# Patient Record
Sex: FEMALE | Race: ASIAN | NOT HISPANIC OR LATINO
[De-identification: names, ages, dates, MRNs, and addresses within clinical notes are randomized per-mention and may not be internally consistent; named-entity substitution may affect disease eponyms.]

---

## 2017-08-31 PROBLEM — Z00.00 ENCOUNTER FOR PREVENTIVE HEALTH EXAMINATION: Status: ACTIVE | Noted: 2017-08-31

## 2017-09-01 ENCOUNTER — APPOINTMENT (OUTPATIENT)
Dept: THORACIC SURGERY | Facility: CLINIC | Age: 77
End: 2017-09-01
Payer: MEDICARE

## 2017-09-01 VITALS
TEMPERATURE: 98 F | OXYGEN SATURATION: 95 % | RESPIRATION RATE: 19 BRPM | HEART RATE: 92 BPM | BODY MASS INDEX: 23.19 KG/M2 | HEIGHT: 62 IN | WEIGHT: 126 LBS | DIASTOLIC BLOOD PRESSURE: 69 MMHG | SYSTOLIC BLOOD PRESSURE: 132 MMHG

## 2017-09-01 DIAGNOSIS — I10 ESSENTIAL (PRIMARY) HYPERTENSION: ICD-10-CM

## 2017-09-01 DIAGNOSIS — Z88.9 ALLERGY STATUS TO UNSPECIFIED DRUGS, MEDICAMENTS AND BIOLOGICAL SUBSTANCES: ICD-10-CM

## 2017-09-01 DIAGNOSIS — Z80.0 FAMILY HISTORY OF MALIGNANT NEOPLASM OF DIGESTIVE ORGANS: ICD-10-CM

## 2017-09-01 PROCEDURE — 99204 OFFICE O/P NEW MOD 45 MIN: CPT

## 2017-09-01 RX ORDER — LEVOTHYROXINE SODIUM 137 UG/1
TABLET ORAL
Refills: 0 | Status: ACTIVE | COMMUNITY

## 2017-09-05 ENCOUNTER — OUTPATIENT (OUTPATIENT)
Dept: OUTPATIENT SERVICES | Facility: HOSPITAL | Age: 77
LOS: 1 days | End: 2017-09-05
Payer: MEDICARE

## 2017-09-05 VITALS
WEIGHT: 126.99 LBS | HEART RATE: 96 BPM | TEMPERATURE: 98 F | OXYGEN SATURATION: 96 % | SYSTOLIC BLOOD PRESSURE: 124 MMHG | DIASTOLIC BLOOD PRESSURE: 70 MMHG | RESPIRATION RATE: 14 BRPM | HEIGHT: 62 IN

## 2017-09-05 DIAGNOSIS — E03.9 HYPOTHYROIDISM, UNSPECIFIED: ICD-10-CM

## 2017-09-05 DIAGNOSIS — R91.8 OTHER NONSPECIFIC ABNORMAL FINDING OF LUNG FIELD: ICD-10-CM

## 2017-09-05 DIAGNOSIS — J45.909 UNSPECIFIED ASTHMA, UNCOMPLICATED: ICD-10-CM

## 2017-09-05 DIAGNOSIS — K21.9 GASTRO-ESOPHAGEAL REFLUX DISEASE WITHOUT ESOPHAGITIS: ICD-10-CM

## 2017-09-05 DIAGNOSIS — I10 ESSENTIAL (PRIMARY) HYPERTENSION: ICD-10-CM

## 2017-09-05 LAB
ALBUMIN SERPL ELPH-MCNC: 4.6 G/DL — SIGNIFICANT CHANGE UP (ref 3.3–5)
ALP SERPL-CCNC: 105 U/L — SIGNIFICANT CHANGE UP (ref 40–120)
ALT FLD-CCNC: 21 U/L — SIGNIFICANT CHANGE UP (ref 4–33)
AST SERPL-CCNC: 23 U/L — SIGNIFICANT CHANGE UP (ref 4–32)
BILIRUB SERPL-MCNC: 0.5 MG/DL — SIGNIFICANT CHANGE UP (ref 0.2–1.2)
BUN SERPL-MCNC: 16 MG/DL — SIGNIFICANT CHANGE UP (ref 7–23)
CALCIUM SERPL-MCNC: 9.6 MG/DL — SIGNIFICANT CHANGE UP (ref 8.4–10.5)
CHLORIDE SERPL-SCNC: 101 MMOL/L — SIGNIFICANT CHANGE UP (ref 98–107)
CO2 SERPL-SCNC: 25 MMOL/L — SIGNIFICANT CHANGE UP (ref 22–31)
CREAT SERPL-MCNC: 0.69 MG/DL — SIGNIFICANT CHANGE UP (ref 0.5–1.3)
GLUCOSE SERPL-MCNC: 73 MG/DL — SIGNIFICANT CHANGE UP (ref 70–99)
HCT VFR BLD CALC: 40.9 % — SIGNIFICANT CHANGE UP (ref 34.5–45)
HGB BLD-MCNC: 13.4 G/DL — SIGNIFICANT CHANGE UP (ref 11.5–15.5)
MCHC RBC-ENTMCNC: 28.2 PG — SIGNIFICANT CHANGE UP (ref 27–34)
MCHC RBC-ENTMCNC: 32.8 % — SIGNIFICANT CHANGE UP (ref 32–36)
MCV RBC AUTO: 86.1 FL — SIGNIFICANT CHANGE UP (ref 80–100)
NRBC # FLD: 0 — SIGNIFICANT CHANGE UP
PLATELET # BLD AUTO: 133 K/UL — LOW (ref 150–400)
PMV BLD: 12.6 FL — SIGNIFICANT CHANGE UP (ref 7–13)
POTASSIUM SERPL-MCNC: 4 MMOL/L — SIGNIFICANT CHANGE UP (ref 3.5–5.3)
POTASSIUM SERPL-SCNC: 4 MMOL/L — SIGNIFICANT CHANGE UP (ref 3.5–5.3)
PROT SERPL-MCNC: 8.3 G/DL — SIGNIFICANT CHANGE UP (ref 6–8.3)
RBC # BLD: 4.75 M/UL — SIGNIFICANT CHANGE UP (ref 3.8–5.2)
RBC # FLD: 13.6 % — SIGNIFICANT CHANGE UP (ref 10.3–14.5)
SODIUM SERPL-SCNC: 143 MMOL/L — SIGNIFICANT CHANGE UP (ref 135–145)
WBC # BLD: 8.55 K/UL — SIGNIFICANT CHANGE UP (ref 3.8–10.5)
WBC # FLD AUTO: 8.55 K/UL — SIGNIFICANT CHANGE UP (ref 3.8–10.5)

## 2017-09-05 PROCEDURE — 93010 ELECTROCARDIOGRAM REPORT: CPT

## 2017-09-05 RX ORDER — SODIUM CHLORIDE 9 MG/ML
1000 INJECTION, SOLUTION INTRAVENOUS
Qty: 0 | Refills: 0 | Status: DISCONTINUED | OUTPATIENT
Start: 2017-09-07 | End: 2017-09-22

## 2017-09-05 NOTE — H&P PST ADULT - PROBLEM SELECTOR PLAN 1
Pt is scheduled for flexible bronchoscopy, endobronchial ultrasound biopsy on 9/7/17.   Pre op instructions including chlorhexidine wash given and pt able to verbalize understanding.

## 2017-09-05 NOTE — H&P PST ADULT - NEGATIVE CARDIOVASCULAR SYMPTOMS
no claudication/no orthopnea/no paroxysmal nocturnal dyspnea/no palpitations/no dyspnea on exertion/no chest pain/no peripheral edema

## 2017-09-05 NOTE — H&P PST ADULT - NEGATIVE MUSCULOSKELETAL SYMPTOMS
no muscle weakness/no joint swelling/no arthralgia/no leg pain L/no leg pain R/no myalgia/no muscle cramps/no stiffness/no neck pain/no arm pain L/no arm pain R

## 2017-09-05 NOTE — H&P PST ADULT - NSANTHOSAYNRD_GEN_A_CORE
Spoke with pharmacy and they have a previous script on file from our office. I have added this to the pt's med list.   No. DEION screening performed.  STOP BANG Legend: 0-2 = LOW Risk; 3-4 = INTERMEDIATE Risk; 5-8 = HIGH Risk

## 2017-09-05 NOTE — H&P PST ADULT - MUSCULOSKELETAL
details… detailed exam no calf tenderness/no joint swelling/no joint erythema/ROM intact/normal strength/no joint warmth

## 2017-09-05 NOTE — H&P PST ADULT - RS GEN PE MLT RESP DETAILS PC
clear to auscultation bilaterally/no rhonchi/no intercostal retractions/no chest wall tenderness/good air movement/respirations non-labored/no wheezes/airway patent/breath sounds equal/no subcutaneous emphysema/no rales

## 2017-09-05 NOTE — H&P PST ADULT - NEGATIVE NEUROLOGICAL SYMPTOMS
no generalized seizures/no loss of sensation/no transient paralysis/no headache/no vertigo/no difficulty walking/no weakness

## 2017-09-05 NOTE — H&P PST ADULT - HISTORY OF PRESENT ILLNESS
78 yo female with PMH hypothyroidism, GERD, hypertension, and asthma presents to pre surgical testing.  Pt reports she has had a cough for over 1 year.  Pt reports she started to develop hoarseness in voice about 1 month ago, diagnosed with partial vocal cord paralysis.  Pt reports she followed up with CT that showed pulmonary mass. Pt is scheduled for flexible bronchoscopy, endobronchial ultrasound biopsy on 9/7/17.

## 2017-09-05 NOTE — H&P PST ADULT - FAMILY HISTORY
Sibling  Still living? Yes, Estimated age: Age Unknown  Family history of cancer, Age at diagnosis: Age Unknown

## 2017-09-05 NOTE — H&P PST ADULT - LYMPHATIC
posterior cervical L/supraclavicular R/supraclavicular L/posterior cervical R/anterior cervical L/anterior cervical R

## 2017-09-05 NOTE — H&P PST ADULT - PMH
Asthma    GERD (gastroesophageal reflux disease)    Hypertension    Hypothyroidism    Other nonspecific abnormal finding of lung field

## 2017-09-06 ENCOUNTER — FORM ENCOUNTER (OUTPATIENT)
Age: 77
End: 2017-09-06

## 2017-09-06 NOTE — ASU PATIENT PROFILE, ADULT - INTERPRETATION SERVICES DECLINED
Patient/Caregiver requests family/friend to interpret. [email or web address]/Patient/Caregiver requests family/friend to interpret.

## 2017-09-06 NOTE — ASU PATIENT PROFILE, ADULT - LANGUAGE ASSISTANCE NEEDED
No-Patient/Caregiver offered and refused free interpretation services. in english pt stated she wants daughter to help if needed/No-Patient/Caregiver offered and refused free interpretation services.

## 2017-09-07 ENCOUNTER — OUTPATIENT (OUTPATIENT)
Dept: OUTPATIENT SERVICES | Facility: HOSPITAL | Age: 77
LOS: 1 days | Discharge: HOME | End: 2017-09-07
Payer: MEDICARE

## 2017-09-07 ENCOUNTER — RESULT REVIEW (OUTPATIENT)
Age: 77
End: 2017-09-07

## 2017-09-07 ENCOUNTER — APPOINTMENT (OUTPATIENT)
Dept: THORACIC SURGERY | Facility: HOSPITAL | Age: 77
End: 2017-09-07
Payer: MEDICARE

## 2017-09-07 ENCOUNTER — TRANSCRIPTION ENCOUNTER (OUTPATIENT)
Age: 77
End: 2017-09-07

## 2017-09-07 VITALS
OXYGEN SATURATION: 95 % | DIASTOLIC BLOOD PRESSURE: 53 MMHG | SYSTOLIC BLOOD PRESSURE: 128 MMHG | RESPIRATION RATE: 16 BRPM | HEART RATE: 85 BPM | WEIGHT: 126.99 LBS | HEIGHT: 62 IN | TEMPERATURE: 98 F

## 2017-09-07 VITALS
DIASTOLIC BLOOD PRESSURE: 52 MMHG | OXYGEN SATURATION: 100 % | HEART RATE: 78 BPM | RESPIRATION RATE: 15 BRPM | SYSTOLIC BLOOD PRESSURE: 107 MMHG

## 2017-09-07 DIAGNOSIS — R91.8 OTHER NONSPECIFIC ABNORMAL FINDING OF LUNG FIELD: ICD-10-CM

## 2017-09-07 LAB
GRAM STN SPT: SIGNIFICANT CHANGE UP
GRAM STN WND: SIGNIFICANT CHANGE UP
SPECIMEN SOURCE: SIGNIFICANT CHANGE UP
SPECIMEN SOURCE: SIGNIFICANT CHANGE UP

## 2017-09-07 PROCEDURE — 88112 CYTOPATH CELL ENHANCE TECH: CPT | Mod: 26,59

## 2017-09-07 PROCEDURE — 88173 CYTOPATH EVAL FNA REPORT: CPT | Mod: 26

## 2017-09-07 PROCEDURE — 31652 BRONCH EBUS SAMPLNG 1/2 NODE: CPT

## 2017-09-07 PROCEDURE — 31629 BRONCHOSCOPY/NEEDLE BX EACH: CPT

## 2017-09-07 PROCEDURE — 31624 DX BRONCHOSCOPE/LAVAGE: CPT

## 2017-09-07 PROCEDURE — 88305 TISSUE EXAM BY PATHOLOGIST: CPT | Mod: 26

## 2017-09-07 PROCEDURE — 71010: CPT | Mod: 26

## 2017-09-07 PROCEDURE — 31645 BRNCHSC W/THER ASPIR 1ST: CPT

## 2017-09-07 RX ORDER — FENTANYL CITRATE 50 UG/ML
25 INJECTION INTRAVENOUS
Qty: 0 | Refills: 0 | Status: DISCONTINUED | OUTPATIENT
Start: 2017-09-07 | End: 2017-09-07

## 2017-09-07 RX ORDER — OXYCODONE HYDROCHLORIDE 5 MG/1
5 TABLET ORAL EVERY 4 HOURS
Qty: 0 | Refills: 0 | Status: DISCONTINUED | OUTPATIENT
Start: 2017-09-07 | End: 2017-09-07

## 2017-09-07 RX ORDER — ONDANSETRON 8 MG/1
4 TABLET, FILM COATED ORAL ONCE
Qty: 0 | Refills: 0 | Status: DISCONTINUED | OUTPATIENT
Start: 2017-09-07 | End: 2017-09-07

## 2017-09-07 RX ADMIN — SODIUM CHLORIDE 30 MILLILITER(S): 9 INJECTION, SOLUTION INTRAVENOUS at 12:38

## 2017-09-07 NOTE — ASU DISCHARGE PLAN (ADULT/PEDIATRIC). - ITEMS TO FOLLOWUP WITH YOUR PHYSICIAN'S
Please follow up with Dr. Liang in the office in 2 weeks.  Please call the office to schedule an appointment at your convenience.

## 2017-09-07 NOTE — ASU DISCHARGE PLAN (ADULT/PEDIATRIC). - MEDICATION SUMMARY - MEDICATIONS TO TAKE
I will START or STAY ON the medications listed below when I get home from the hospital:    Pulmicort Respules 0.5 mg/2 mL inhalation suspension  -- 2 milliliter(s) inhaled 2 times a day  -- Indication: For Home Medication    Xopenex 0.63 mg/3 mL inhalation solution  -- 3 milliliter(s) inhaled 3 times a day, As Needed  -- Indication: For Home Medication    Ventolin HFA 90 mcg/inh inhalation aerosol  -- 2 puff(s) inhaled 4 times a day, As Needed  -- Indication: For Home Medication    amLODIPine 10 mg oral tablet  -- 1 tab(s) by mouth once a day in am  -- Indication: For Home Medication    Artificial Tears ophthalmic solution  -- 1 drop(s) to each affected eye 2 times a day  -- Indication: For Home Medication    omeprazole 40 mg oral delayed release capsule  -- 1 cap(s) by mouth once a day in am  -- Indication: For Home Medication    Synthroid 75 mcg (0.075 mg) oral tablet  -- 1 tab(s) by mouth once a day in am  -- Indication: For Home Medication

## 2017-09-08 LAB
CULTURE - ACID FAST SMEAR CONCENTRATED: SIGNIFICANT CHANGE UP
CULTURE - ACID FAST SMEAR CONCENTRATED: SIGNIFICANT CHANGE UP
SPECIMEN SOURCE: SIGNIFICANT CHANGE UP
SPECIMEN SOURCE: SIGNIFICANT CHANGE UP

## 2017-09-10 LAB — BACTERIA SPT RESP CULT: SIGNIFICANT CHANGE UP

## 2017-09-12 ENCOUNTER — APPOINTMENT (OUTPATIENT)
Dept: THORACIC SURGERY | Facility: CLINIC | Age: 77
End: 2017-09-12
Payer: MEDICARE

## 2017-09-12 VITALS
RESPIRATION RATE: 17 BRPM | TEMPERATURE: 98.5 F | HEIGHT: 62 IN | OXYGEN SATURATION: 96 % | HEART RATE: 95 BPM | BODY MASS INDEX: 23.37 KG/M2 | DIASTOLIC BLOOD PRESSURE: 82 MMHG | WEIGHT: 127 LBS | SYSTOLIC BLOOD PRESSURE: 132 MMHG

## 2017-09-12 LAB
SPECIMEN SOURCE: SIGNIFICANT CHANGE UP
SPECIMEN SOURCE: SIGNIFICANT CHANGE UP

## 2017-09-12 PROCEDURE — 99215 OFFICE O/P EST HI 40 MIN: CPT

## 2017-09-13 LAB — CULTURE - SURGICAL SITE: SIGNIFICANT CHANGE UP

## 2017-09-14 LAB
SPECIMEN SOURCE: SIGNIFICANT CHANGE UP
SPECIMEN SOURCE: SIGNIFICANT CHANGE UP

## 2017-09-15 ENCOUNTER — OUTPATIENT (OUTPATIENT)
Dept: OUTPATIENT SERVICES | Facility: HOSPITAL | Age: 77
LOS: 1 days | End: 2017-09-15

## 2017-09-15 LAB
BLD GP AB SCN SERPL QL: NEGATIVE — SIGNIFICANT CHANGE UP
RH IG SCN BLD-IMP: POSITIVE — SIGNIFICANT CHANGE UP

## 2017-09-18 ENCOUNTER — FORM ENCOUNTER (OUTPATIENT)
Age: 77
End: 2017-09-18

## 2017-09-19 ENCOUNTER — RESULT REVIEW (OUTPATIENT)
Age: 77
End: 2017-09-19

## 2017-09-19 ENCOUNTER — APPOINTMENT (OUTPATIENT)
Dept: THORACIC SURGERY | Facility: HOSPITAL | Age: 77
End: 2017-09-19
Payer: MEDICARE

## 2017-09-19 ENCOUNTER — OUTPATIENT (OUTPATIENT)
Dept: OUTPATIENT SERVICES | Facility: HOSPITAL | Age: 77
LOS: 1 days | Discharge: HOME | End: 2017-09-19
Payer: MEDICARE

## 2017-09-19 ENCOUNTER — TRANSCRIPTION ENCOUNTER (OUTPATIENT)
Age: 77
End: 2017-09-19

## 2017-09-19 VITALS
SYSTOLIC BLOOD PRESSURE: 122 MMHG | HEART RATE: 95 BPM | DIASTOLIC BLOOD PRESSURE: 85 MMHG | RESPIRATION RATE: 16 BRPM | HEIGHT: 62.99 IN | WEIGHT: 128.09 LBS | TEMPERATURE: 98 F | OXYGEN SATURATION: 97 %

## 2017-09-19 VITALS
OXYGEN SATURATION: 96 % | HEART RATE: 87 BPM | DIASTOLIC BLOOD PRESSURE: 67 MMHG | RESPIRATION RATE: 20 BRPM | SYSTOLIC BLOOD PRESSURE: 134 MMHG | TEMPERATURE: 98 F

## 2017-09-19 DIAGNOSIS — C38.1 MALIGNANT NEOPLASM OF ANTERIOR MEDIASTINUM: ICD-10-CM

## 2017-09-19 DIAGNOSIS — R91.8 OTHER NONSPECIFIC ABNORMAL FINDING OF LUNG FIELD: ICD-10-CM

## 2017-09-19 LAB
BLD GP AB SCN SERPL QL: NEGATIVE — SIGNIFICANT CHANGE UP
GRAM STN WND: SIGNIFICANT CHANGE UP
GRAM STN WND: SIGNIFICANT CHANGE UP
RH IG SCN BLD-IMP: POSITIVE — SIGNIFICANT CHANGE UP
SPECIMEN SOURCE: SIGNIFICANT CHANGE UP
SPECIMEN SOURCE: SIGNIFICANT CHANGE UP

## 2017-09-19 PROCEDURE — 88331 PATH CONSLTJ SURG 1 BLK 1SPC: CPT | Mod: 26

## 2017-09-19 PROCEDURE — 88342 IMHCHEM/IMCYTCHM 1ST ANTB: CPT | Mod: 26

## 2017-09-19 PROCEDURE — 88341 IMHCHEM/IMCYTCHM EA ADD ANTB: CPT | Mod: 26

## 2017-09-19 PROCEDURE — 88305 TISSUE EXAM BY PATHOLOGIST: CPT | Mod: 26

## 2017-09-19 PROCEDURE — 71010: CPT | Mod: 26

## 2017-09-19 PROCEDURE — 39402 MEDIASTINOSCPY W/LMPH NOD BX: CPT

## 2017-09-19 PROCEDURE — 31622 DX BRONCHOSCOPE/WASH: CPT

## 2017-09-19 RX ORDER — SODIUM CHLORIDE 9 MG/ML
1000 INJECTION, SOLUTION INTRAVENOUS
Qty: 0 | Refills: 0 | Status: DISCONTINUED | OUTPATIENT
Start: 2017-09-19 | End: 2017-10-04

## 2017-09-19 RX ORDER — FENTANYL CITRATE 50 UG/ML
20 INJECTION INTRAVENOUS
Qty: 0 | Refills: 0 | Status: DISCONTINUED | OUTPATIENT
Start: 2017-09-19 | End: 2017-09-19

## 2017-09-19 NOTE — BRIEF OPERATIVE NOTE - PROCEDURE
<<-----Click on this checkbox to enter Procedure Mediastinoscopy by cervical approach  09/19/2017    [facility]

## 2017-09-19 NOTE — BRIEF OPERATIVE NOTE - OPERATION/FINDINGS
Cervical mediastinoscopy.  Biopsy of R4, 7 LNs, and L paratracheal mass.  Purulent fluid encountered, cultures sent

## 2017-09-19 NOTE — ASU DISCHARGE PLAN (ADULT/PEDIATRIC). - ITEMS TO FOLLOWUP WITH YOUR PHYSICIAN'S
Please follow up with Dr. Liang in his office in about 2 weeks.  Please call his office the schedule an appointment at your convenience.

## 2017-09-19 NOTE — ASU DISCHARGE PLAN (ADULT/PEDIATRIC). - NOTIFY
Inability to Tolerate Liquids or Foods/Fever greater than 101/Bleeding that does not stop/Pain not relieved by Medications Pain not relieved by Medications/Persistent Nausea and Vomiting/Bleeding that does not stop/Inability to Tolerate Liquids or Foods/Fever greater than 101

## 2017-09-19 NOTE — ASU DISCHARGE PLAN (ADULT/PEDIATRIC). - MEDICATION SUMMARY - MEDICATIONS TO TAKE
I will START or STAY ON the medications listed below when I get home from the hospital:    Pulmicort Respules 0.5 mg/2 mL inhalation suspension  -- 2 milliliter(s) inhaled 2 times a day  -- Indication: For Home Medication    Tylenol 325 mg oral tablet  -- 2 tab(s) by mouth every 4 hours, As Needed - for moderate pain  -- Indication: For Pain Control    Xopenex 0.63 mg/3 mL inhalation solution  -- 3 milliliter(s) inhaled 3 times a day, As Needed  -- Indication: For Home Medication    Ventolin HFA 90 mcg/inh inhalation aerosol  -- 2 puff(s) inhaled 4 times a day, As Needed  -- Indication: For Home Medication    amLODIPine 10 mg oral tablet  -- 1 tab(s) by mouth once a day in am  -- Indication: For Home Medication    Artificial Tears ophthalmic solution  -- 1 drop(s) to each affected eye 2 times a day  -- Indication: For Home Medication    omeprazole 40 mg oral delayed release capsule  -- 1 cap(s) by mouth once a day in am  -- Indication: For Home Medication    Synthroid 75 mcg (0.075 mg) oral tablet  -- 1 tab(s) by mouth once a day in am  -- Indication: For Home Medication

## 2017-09-22 LAB
SPECIMEN SOURCE: SIGNIFICANT CHANGE UP
SPECIMEN SOURCE: SIGNIFICANT CHANGE UP

## 2017-09-25 LAB
BACTERIA SKIN AEROBE CULT: SIGNIFICANT CHANGE UP
CULTURE - SURGICAL SITE: SIGNIFICANT CHANGE UP

## 2017-09-26 ENCOUNTER — APPOINTMENT (OUTPATIENT)
Dept: THORACIC SURGERY | Facility: CLINIC | Age: 77
End: 2017-09-26
Payer: MEDICARE

## 2017-09-26 VITALS
HEART RATE: 88 BPM | BODY MASS INDEX: 22.86 KG/M2 | TEMPERATURE: 98.2 F | RESPIRATION RATE: 18 BRPM | OXYGEN SATURATION: 96 % | DIASTOLIC BLOOD PRESSURE: 69 MMHG | WEIGHT: 125 LBS | SYSTOLIC BLOOD PRESSURE: 132 MMHG

## 2017-09-26 LAB
SPECIMEN SOURCE: SIGNIFICANT CHANGE UP
SPECIMEN SOURCE: SIGNIFICANT CHANGE UP

## 2017-09-26 PROCEDURE — 99024 POSTOP FOLLOW-UP VISIT: CPT

## 2017-09-26 RX ORDER — AMOXICILLIN 500 MG/1
500 CAPSULE ORAL
Qty: 15 | Refills: 0 | Status: COMPLETED | COMMUNITY
Start: 2017-08-10

## 2017-09-26 RX ORDER — PREDNISONE 10 MG/1
10 TABLET ORAL
Qty: 21 | Refills: 0 | Status: COMPLETED | COMMUNITY
Start: 2017-07-21

## 2017-09-26 RX ORDER — ALPRAZOLAM 0.25 MG/1
0.25 TABLET ORAL
Qty: 30 | Refills: 0 | Status: COMPLETED | COMMUNITY
Start: 2017-09-05

## 2017-09-26 RX ORDER — FLUCONAZOLE 100 MG/1
100 TABLET ORAL
Qty: 10 | Refills: 0 | Status: COMPLETED | COMMUNITY
Start: 2017-08-16

## 2017-09-26 RX ORDER — OMEPRAZOLE 20 MG/1
20 CAPSULE, DELAYED RELEASE ORAL
Qty: 30 | Refills: 0 | Status: COMPLETED | COMMUNITY
Start: 2017-07-07

## 2017-09-28 ENCOUNTER — OUTPATIENT (OUTPATIENT)
Dept: OUTPATIENT SERVICES | Facility: HOSPITAL | Age: 77
LOS: 1 days | Discharge: HOME | End: 2017-09-28

## 2017-09-28 DIAGNOSIS — D64.9 ANEMIA, UNSPECIFIED: ICD-10-CM

## 2017-09-29 ENCOUNTER — APPOINTMENT (OUTPATIENT)
Dept: HEMATOLOGY ONCOLOGY | Facility: CLINIC | Age: 77
End: 2017-09-29
Payer: MEDICARE

## 2017-09-29 VITALS
HEIGHT: 62 IN | BODY MASS INDEX: 23.27 KG/M2 | OXYGEN SATURATION: 98 % | RESPIRATION RATE: 16 BRPM | WEIGHT: 126.43 LBS | SYSTOLIC BLOOD PRESSURE: 125 MMHG | HEART RATE: 88 BPM | TEMPERATURE: 98.2 F | DIASTOLIC BLOOD PRESSURE: 72 MMHG

## 2017-09-29 PROCEDURE — 99205 OFFICE O/P NEW HI 60 MIN: CPT

## 2017-10-03 DIAGNOSIS — C34.12 MALIGNANT NEOPLASM OF UPPER LOBE, LEFT BRONCHUS OR LUNG: ICD-10-CM

## 2017-10-04 ENCOUNTER — TRANSCRIPTION ENCOUNTER (OUTPATIENT)
Age: 77
End: 2017-10-04

## 2017-10-04 LAB
FUNGUS SPEC QL CULT: SIGNIFICANT CHANGE UP
FUNGUS SPEC QL CULT: SIGNIFICANT CHANGE UP

## 2017-10-05 ENCOUNTER — OUTPATIENT (OUTPATIENT)
Dept: OUTPATIENT SERVICES | Facility: HOSPITAL | Age: 77
LOS: 1 days | Discharge: HOME | End: 2017-10-05

## 2017-10-05 DIAGNOSIS — C34.12 MALIGNANT NEOPLASM OF UPPER LOBE, LEFT BRONCHUS OR LUNG: ICD-10-CM

## 2017-10-06 ENCOUNTER — LABORATORY RESULT (OUTPATIENT)
Age: 77
End: 2017-10-06

## 2017-10-06 ENCOUNTER — RESULT REVIEW (OUTPATIENT)
Age: 77
End: 2017-10-06

## 2017-10-06 ENCOUNTER — APPOINTMENT (OUTPATIENT)
Dept: INFUSION THERAPY | Facility: HOSPITAL | Age: 77
End: 2017-10-06

## 2017-10-06 LAB
BASOPHILS # BLD AUTO: 0.1 K/UL — SIGNIFICANT CHANGE UP (ref 0–0.2)
BASOPHILS NFR BLD AUTO: 0.7 % — SIGNIFICANT CHANGE UP (ref 0–2)
EOSINOPHIL # BLD AUTO: 0.2 K/UL — SIGNIFICANT CHANGE UP (ref 0–0.5)
EOSINOPHIL NFR BLD AUTO: 1.4 % — SIGNIFICANT CHANGE UP (ref 0–6)
HCT VFR BLD CALC: 38 % — SIGNIFICANT CHANGE UP (ref 34.5–45)
HGB BLD-MCNC: 13 G/DL — SIGNIFICANT CHANGE UP (ref 11.5–15.5)
LYMPHOCYTES # BLD AUTO: 2.9 K/UL — SIGNIFICANT CHANGE UP (ref 1–3.3)
LYMPHOCYTES # BLD AUTO: 26.6 % — SIGNIFICANT CHANGE UP (ref 13–44)
MCHC RBC-ENTMCNC: 30.2 PG — SIGNIFICANT CHANGE UP (ref 27–34)
MCHC RBC-ENTMCNC: 34.2 G/DL — SIGNIFICANT CHANGE UP (ref 32–36)
MCV RBC AUTO: 88.2 FL — SIGNIFICANT CHANGE UP (ref 80–100)
MONOCYTES # BLD AUTO: 0.6 K/UL — SIGNIFICANT CHANGE UP (ref 0–0.9)
MONOCYTES NFR BLD AUTO: 5.4 % — SIGNIFICANT CHANGE UP (ref 2–14)
NEUTROPHILS # BLD AUTO: 7.3 K/UL — SIGNIFICANT CHANGE UP (ref 1.8–7.4)
NEUTROPHILS NFR BLD AUTO: 65.9 % — SIGNIFICANT CHANGE UP (ref 43–77)
PLATELET # BLD AUTO: 243 K/UL — SIGNIFICANT CHANGE UP (ref 150–400)
RBC # BLD: 4.31 M/UL — SIGNIFICANT CHANGE UP (ref 3.8–5.2)
RBC # FLD: 13.8 % — SIGNIFICANT CHANGE UP (ref 10.3–14.5)
WBC # BLD: 11 K/UL — HIGH (ref 3.8–10.5)
WBC # FLD AUTO: 11 K/UL — HIGH (ref 3.8–10.5)

## 2017-10-06 RX ORDER — ALBUTEROL 90 UG/1
2 AEROSOL, METERED ORAL
Qty: 0 | Refills: 0 | COMMUNITY

## 2017-10-06 RX ORDER — LEVALBUTEROL 1.25 MG/.5ML
3 SOLUTION, CONCENTRATE RESPIRATORY (INHALATION)
Qty: 0 | Refills: 0 | COMMUNITY

## 2017-10-06 RX ORDER — BUDESONIDE, MICRONIZED 100 %
2 POWDER (GRAM) MISCELLANEOUS
Qty: 0 | Refills: 0 | COMMUNITY

## 2017-10-09 DIAGNOSIS — Z51.11 ENCOUNTER FOR ANTINEOPLASTIC CHEMOTHERAPY: ICD-10-CM

## 2017-10-09 DIAGNOSIS — R11.2 NAUSEA WITH VOMITING, UNSPECIFIED: ICD-10-CM

## 2017-10-13 ENCOUNTER — RESULT REVIEW (OUTPATIENT)
Age: 77
End: 2017-10-13

## 2017-10-13 ENCOUNTER — LABORATORY RESULT (OUTPATIENT)
Age: 77
End: 2017-10-13

## 2017-10-13 ENCOUNTER — APPOINTMENT (OUTPATIENT)
Dept: HEMATOLOGY ONCOLOGY | Facility: CLINIC | Age: 77
End: 2017-10-13
Payer: MEDICARE

## 2017-10-13 ENCOUNTER — APPOINTMENT (OUTPATIENT)
Dept: INFUSION THERAPY | Facility: HOSPITAL | Age: 77
End: 2017-10-13

## 2017-10-13 ENCOUNTER — APPOINTMENT (OUTPATIENT)
Dept: RADIATION ONCOLOGY | Facility: CLINIC | Age: 77
End: 2017-10-13
Payer: MEDICARE

## 2017-10-13 VITALS
HEART RATE: 86 BPM | DIASTOLIC BLOOD PRESSURE: 70 MMHG | WEIGHT: 128.31 LBS | BODY MASS INDEX: 23.47 KG/M2 | TEMPERATURE: 98 F | RESPIRATION RATE: 16 BRPM | SYSTOLIC BLOOD PRESSURE: 120 MMHG | OXYGEN SATURATION: 99 %

## 2017-10-13 DIAGNOSIS — I10 ESSENTIAL (PRIMARY) HYPERTENSION: ICD-10-CM

## 2017-10-13 DIAGNOSIS — Z80.42 FAMILY HISTORY OF MALIGNANT NEOPLASM OF PROSTATE: ICD-10-CM

## 2017-10-13 DIAGNOSIS — J45.20 MILD INTERMITTENT ASTHMA, UNCOMPLICATED: ICD-10-CM

## 2017-10-13 LAB
BASOPHILS # BLD AUTO: 0.1 K/UL — SIGNIFICANT CHANGE UP (ref 0–0.2)
BASOPHILS NFR BLD AUTO: 0.6 % — SIGNIFICANT CHANGE UP (ref 0–2)
EOSINOPHIL # BLD AUTO: 0.1 K/UL — SIGNIFICANT CHANGE UP (ref 0–0.5)
EOSINOPHIL NFR BLD AUTO: 0.9 % — SIGNIFICANT CHANGE UP (ref 0–6)
HCT VFR BLD CALC: 35 % — SIGNIFICANT CHANGE UP (ref 34.5–45)
HGB BLD-MCNC: 12.4 G/DL — SIGNIFICANT CHANGE UP (ref 11.5–15.5)
LYMPHOCYTES # BLD AUTO: 2.8 K/UL — SIGNIFICANT CHANGE UP (ref 1–3.3)
LYMPHOCYTES # BLD AUTO: 30 % — SIGNIFICANT CHANGE UP (ref 13–44)
MCHC RBC-ENTMCNC: 31.1 PG — SIGNIFICANT CHANGE UP (ref 27–34)
MCHC RBC-ENTMCNC: 35.5 G/DL — SIGNIFICANT CHANGE UP (ref 32–36)
MCV RBC AUTO: 87.6 FL — SIGNIFICANT CHANGE UP (ref 80–100)
MONOCYTES # BLD AUTO: 0.5 K/UL — SIGNIFICANT CHANGE UP (ref 0–0.9)
MONOCYTES NFR BLD AUTO: 5 % — SIGNIFICANT CHANGE UP (ref 2–14)
NEUTROPHILS # BLD AUTO: 6 K/UL — SIGNIFICANT CHANGE UP (ref 1.8–7.4)
NEUTROPHILS NFR BLD AUTO: 63.5 % — SIGNIFICANT CHANGE UP (ref 43–77)
PLATELET # BLD AUTO: 219 K/UL — SIGNIFICANT CHANGE UP (ref 150–400)
RBC # BLD: 4 M/UL — SIGNIFICANT CHANGE UP (ref 3.8–5.2)
RBC # FLD: 12.1 % — SIGNIFICANT CHANGE UP (ref 10.3–14.5)
WBC # BLD: 9.4 K/UL — SIGNIFICANT CHANGE UP (ref 3.8–10.5)
WBC # FLD AUTO: 9.4 K/UL — SIGNIFICANT CHANGE UP (ref 3.8–10.5)

## 2017-10-13 PROCEDURE — 77263 THER RADIOLOGY TX PLNG CPLX: CPT

## 2017-10-13 PROCEDURE — 99215 OFFICE O/P EST HI 40 MIN: CPT

## 2017-10-13 PROCEDURE — 99204 OFFICE O/P NEW MOD 45 MIN: CPT | Mod: 25

## 2017-10-16 DIAGNOSIS — E86.0 DEHYDRATION: ICD-10-CM

## 2017-10-17 ENCOUNTER — APPOINTMENT (OUTPATIENT)
Dept: INFUSION THERAPY | Facility: HOSPITAL | Age: 77
End: 2017-10-17

## 2017-10-17 LAB
FUNGUS SPEC QL CULT: SIGNIFICANT CHANGE UP
FUNGUS SPEC QL CULT: SIGNIFICANT CHANGE UP

## 2017-10-17 PROCEDURE — 77470 SPECIAL RADIATION TREATMENT: CPT | Mod: 26

## 2017-10-19 LAB
ACID FAST STN SPEC: SIGNIFICANT CHANGE UP
ACID FAST STN SPEC: SIGNIFICANT CHANGE UP

## 2017-10-20 ENCOUNTER — RESULT REVIEW (OUTPATIENT)
Age: 77
End: 2017-10-20

## 2017-10-20 ENCOUNTER — LABORATORY RESULT (OUTPATIENT)
Age: 77
End: 2017-10-20

## 2017-10-20 ENCOUNTER — APPOINTMENT (OUTPATIENT)
Dept: INFUSION THERAPY | Facility: HOSPITAL | Age: 77
End: 2017-10-20

## 2017-10-20 LAB
BASOPHILS # BLD AUTO: 0.1 K/UL — SIGNIFICANT CHANGE UP (ref 0–0.2)
BASOPHILS NFR BLD AUTO: 0.7 % — SIGNIFICANT CHANGE UP (ref 0–2)
EOSINOPHIL # BLD AUTO: 0.1 K/UL — SIGNIFICANT CHANGE UP (ref 0–0.5)
EOSINOPHIL NFR BLD AUTO: 1.3 % — SIGNIFICANT CHANGE UP (ref 0–6)
HCT VFR BLD CALC: 35.9 % — SIGNIFICANT CHANGE UP (ref 34.5–45)
HGB BLD-MCNC: 12.2 G/DL — SIGNIFICANT CHANGE UP (ref 11.5–15.5)
LYMPHOCYTES # BLD AUTO: 2.2 K/UL — SIGNIFICANT CHANGE UP (ref 1–3.3)
LYMPHOCYTES # BLD AUTO: 29.8 % — SIGNIFICANT CHANGE UP (ref 13–44)
MCHC RBC-ENTMCNC: 30.4 PG — SIGNIFICANT CHANGE UP (ref 27–34)
MCHC RBC-ENTMCNC: 34.1 G/DL — SIGNIFICANT CHANGE UP (ref 32–36)
MCV RBC AUTO: 89.3 FL — SIGNIFICANT CHANGE UP (ref 80–100)
MONOCYTES # BLD AUTO: 0.4 K/UL — SIGNIFICANT CHANGE UP (ref 0–0.9)
MONOCYTES NFR BLD AUTO: 5.4 % — SIGNIFICANT CHANGE UP (ref 2–14)
NEUTROPHILS # BLD AUTO: 4.7 K/UL — SIGNIFICANT CHANGE UP (ref 1.8–7.4)
NEUTROPHILS NFR BLD AUTO: 62.8 % — SIGNIFICANT CHANGE UP (ref 43–77)
PLATELET # BLD AUTO: 228 K/UL — SIGNIFICANT CHANGE UP (ref 150–400)
RBC # BLD: 4.02 M/UL — SIGNIFICANT CHANGE UP (ref 3.8–5.2)
RBC # FLD: 12.7 % — SIGNIFICANT CHANGE UP (ref 10.3–14.5)
WBC # BLD: 7.5 K/UL — SIGNIFICANT CHANGE UP (ref 3.8–10.5)
WBC # FLD AUTO: 7.5 K/UL — SIGNIFICANT CHANGE UP (ref 3.8–10.5)

## 2017-10-24 ENCOUNTER — APPOINTMENT (OUTPATIENT)
Dept: INFUSION THERAPY | Facility: HOSPITAL | Age: 77
End: 2017-10-24

## 2017-10-25 ENCOUNTER — APPOINTMENT (OUTPATIENT)
Dept: HEMATOLOGY ONCOLOGY | Facility: CLINIC | Age: 77
End: 2017-10-25
Payer: MEDICARE

## 2017-10-25 VITALS
WEIGHT: 126.99 LBS | DIASTOLIC BLOOD PRESSURE: 72 MMHG | SYSTOLIC BLOOD PRESSURE: 123 MMHG | RESPIRATION RATE: 16 BRPM | TEMPERATURE: 98.5 F | OXYGEN SATURATION: 99 % | BODY MASS INDEX: 23.23 KG/M2 | HEART RATE: 97 BPM

## 2017-10-25 PROCEDURE — 99215 OFFICE O/P EST HI 40 MIN: CPT

## 2017-10-27 ENCOUNTER — RESULT REVIEW (OUTPATIENT)
Age: 77
End: 2017-10-27

## 2017-10-27 ENCOUNTER — LABORATORY RESULT (OUTPATIENT)
Age: 77
End: 2017-10-27

## 2017-10-27 ENCOUNTER — APPOINTMENT (OUTPATIENT)
Dept: INFUSION THERAPY | Facility: HOSPITAL | Age: 77
End: 2017-10-27

## 2017-10-27 LAB
BASOPHILS # BLD AUTO: 0 K/UL — SIGNIFICANT CHANGE UP (ref 0–0.2)
BASOPHILS NFR BLD AUTO: 0.6 % — SIGNIFICANT CHANGE UP (ref 0–2)
EOSINOPHIL # BLD AUTO: 0.1 K/UL — SIGNIFICANT CHANGE UP (ref 0–0.5)
EOSINOPHIL NFR BLD AUTO: 1.4 % — SIGNIFICANT CHANGE UP (ref 0–6)
HCT VFR BLD CALC: 34.5 % — SIGNIFICANT CHANGE UP (ref 34.5–45)
HGB BLD-MCNC: 11.8 G/DL — SIGNIFICANT CHANGE UP (ref 11.5–15.5)
LYMPHOCYTES # BLD AUTO: 2.1 K/UL — SIGNIFICANT CHANGE UP (ref 1–3.3)
LYMPHOCYTES # BLD AUTO: 33.2 % — SIGNIFICANT CHANGE UP (ref 13–44)
MCHC RBC-ENTMCNC: 30.3 PG — SIGNIFICANT CHANGE UP (ref 27–34)
MCHC RBC-ENTMCNC: 34.3 G/DL — SIGNIFICANT CHANGE UP (ref 32–36)
MCV RBC AUTO: 88.3 FL — SIGNIFICANT CHANGE UP (ref 80–100)
MONOCYTES # BLD AUTO: 0.5 K/UL — SIGNIFICANT CHANGE UP (ref 0–0.9)
MONOCYTES NFR BLD AUTO: 7.8 % — SIGNIFICANT CHANGE UP (ref 2–14)
NEUTROPHILS # BLD AUTO: 3.6 K/UL — SIGNIFICANT CHANGE UP (ref 1.8–7.4)
NEUTROPHILS NFR BLD AUTO: 57.1 % — SIGNIFICANT CHANGE UP (ref 43–77)
PLATELET # BLD AUTO: 208 K/UL — SIGNIFICANT CHANGE UP (ref 150–400)
RBC # BLD: 3.9 M/UL — SIGNIFICANT CHANGE UP (ref 3.8–5.2)
RBC # FLD: 13 % — SIGNIFICANT CHANGE UP (ref 10.3–14.5)
WBC # BLD: 6.3 K/UL — SIGNIFICANT CHANGE UP (ref 3.8–10.5)
WBC # FLD AUTO: 6.3 K/UL — SIGNIFICANT CHANGE UP (ref 3.8–10.5)

## 2017-10-31 ENCOUNTER — APPOINTMENT (OUTPATIENT)
Dept: INFUSION THERAPY | Facility: HOSPITAL | Age: 77
End: 2017-10-31

## 2017-10-31 LAB
ACID FAST STN SPEC: SIGNIFICANT CHANGE UP
ACID FAST STN SPEC: SIGNIFICANT CHANGE UP

## 2017-10-31 PROCEDURE — 77338 DESIGN MLC DEVICE FOR IMRT: CPT | Mod: 26

## 2017-10-31 PROCEDURE — 77300 RADIATION THERAPY DOSE PLAN: CPT | Mod: 26

## 2017-10-31 PROCEDURE — 77301 RADIOTHERAPY DOSE PLAN IMRT: CPT | Mod: 26

## 2017-11-03 ENCOUNTER — RESULT REVIEW (OUTPATIENT)
Age: 77
End: 2017-11-03

## 2017-11-03 ENCOUNTER — APPOINTMENT (OUTPATIENT)
Dept: INFUSION THERAPY | Facility: HOSPITAL | Age: 77
End: 2017-11-03

## 2017-11-03 ENCOUNTER — APPOINTMENT (OUTPATIENT)
Dept: HEMATOLOGY ONCOLOGY | Facility: CLINIC | Age: 77
End: 2017-11-03
Payer: MEDICARE

## 2017-11-03 ENCOUNTER — LABORATORY RESULT (OUTPATIENT)
Age: 77
End: 2017-11-03

## 2017-11-03 LAB
BASOPHILS # BLD AUTO: 0.1 K/UL — SIGNIFICANT CHANGE UP (ref 0–0.2)
BASOPHILS NFR BLD AUTO: 1 % — SIGNIFICANT CHANGE UP (ref 0–2)
EOSINOPHIL # BLD AUTO: 0 K/UL — SIGNIFICANT CHANGE UP (ref 0–0.5)
EOSINOPHIL NFR BLD AUTO: 0.7 % — SIGNIFICANT CHANGE UP (ref 0–6)
HCT VFR BLD CALC: 32.8 % — LOW (ref 34.5–45)
HGB BLD-MCNC: 11.3 G/DL — LOW (ref 11.5–15.5)
LYMPHOCYTES # BLD AUTO: 2.7 K/UL — SIGNIFICANT CHANGE UP (ref 1–3.3)
LYMPHOCYTES # BLD AUTO: 35.9 % — SIGNIFICANT CHANGE UP (ref 13–44)
MCHC RBC-ENTMCNC: 30.9 PG — SIGNIFICANT CHANGE UP (ref 27–34)
MCHC RBC-ENTMCNC: 34.5 G/DL — SIGNIFICANT CHANGE UP (ref 32–36)
MCV RBC AUTO: 89.7 FL — SIGNIFICANT CHANGE UP (ref 80–100)
MONOCYTES # BLD AUTO: 0.5 K/UL — SIGNIFICANT CHANGE UP (ref 0–0.9)
MONOCYTES NFR BLD AUTO: 6.5 % — SIGNIFICANT CHANGE UP (ref 2–14)
NEUTROPHILS # BLD AUTO: 4.1 K/UL — SIGNIFICANT CHANGE UP (ref 1.8–7.4)
NEUTROPHILS NFR BLD AUTO: 55.9 % — SIGNIFICANT CHANGE UP (ref 43–77)
PLATELET # BLD AUTO: 208 K/UL — SIGNIFICANT CHANGE UP (ref 150–400)
RBC # BLD: 3.65 M/UL — LOW (ref 3.8–5.2)
RBC # FLD: 13.4 % — SIGNIFICANT CHANGE UP (ref 10.3–14.5)
WBC # BLD: 7.4 K/UL — SIGNIFICANT CHANGE UP (ref 3.8–10.5)
WBC # FLD AUTO: 7.4 K/UL — SIGNIFICANT CHANGE UP (ref 3.8–10.5)

## 2017-11-03 PROCEDURE — 99214 OFFICE O/P EST MOD 30 MIN: CPT

## 2017-11-06 ENCOUNTER — OUTPATIENT (OUTPATIENT)
Dept: OUTPATIENT SERVICES | Facility: HOSPITAL | Age: 77
LOS: 1 days | Discharge: HOME | End: 2017-11-06

## 2017-11-06 DIAGNOSIS — C34.12 MALIGNANT NEOPLASM OF UPPER LOBE, LEFT BRONCHUS OR LUNG: ICD-10-CM

## 2017-11-06 PROCEDURE — 77427 RADIATION TX MANAGEMENT X5: CPT

## 2017-11-06 PROCEDURE — 77387B: CUSTOM | Mod: 26

## 2017-11-07 ENCOUNTER — APPOINTMENT (OUTPATIENT)
Dept: INFUSION THERAPY | Facility: HOSPITAL | Age: 77
End: 2017-11-07

## 2017-11-07 ENCOUNTER — OTHER (OUTPATIENT)
Age: 77
End: 2017-11-07

## 2017-11-07 PROCEDURE — 77387B: CUSTOM | Mod: 26

## 2017-11-08 DIAGNOSIS — E86.0 DEHYDRATION: ICD-10-CM

## 2017-11-08 PROCEDURE — 77387B: CUSTOM | Mod: 26

## 2017-11-09 PROCEDURE — 77387B: CUSTOM | Mod: 26

## 2017-11-10 ENCOUNTER — RESULT REVIEW (OUTPATIENT)
Age: 77
End: 2017-11-10

## 2017-11-10 ENCOUNTER — LABORATORY RESULT (OUTPATIENT)
Age: 77
End: 2017-11-10

## 2017-11-10 ENCOUNTER — APPOINTMENT (OUTPATIENT)
Dept: INFUSION THERAPY | Facility: HOSPITAL | Age: 77
End: 2017-11-10

## 2017-11-10 VITALS
RESPIRATION RATE: 18 BRPM | SYSTOLIC BLOOD PRESSURE: 137 MMHG | DIASTOLIC BLOOD PRESSURE: 77 MMHG | BODY MASS INDEX: 23.57 KG/M2 | HEART RATE: 94 BPM | WEIGHT: 128.86 LBS | OXYGEN SATURATION: 96 %

## 2017-11-10 LAB
BASOPHILS # BLD AUTO: 0 K/UL — SIGNIFICANT CHANGE UP (ref 0–0.2)
BASOPHILS NFR BLD AUTO: 0.6 % — SIGNIFICANT CHANGE UP (ref 0–2)
EOSINOPHIL # BLD AUTO: 0.2 K/UL — SIGNIFICANT CHANGE UP (ref 0–0.5)
EOSINOPHIL NFR BLD AUTO: 2.6 % — SIGNIFICANT CHANGE UP (ref 0–6)
HCT VFR BLD CALC: 32.4 % — LOW (ref 34.5–45)
HGB BLD-MCNC: 11.5 G/DL — SIGNIFICANT CHANGE UP (ref 11.5–15.5)
LYMPHOCYTES # BLD AUTO: 1.9 K/UL — SIGNIFICANT CHANGE UP (ref 1–3.3)
LYMPHOCYTES # BLD AUTO: 32 % — SIGNIFICANT CHANGE UP (ref 13–44)
MCHC RBC-ENTMCNC: 32 PG — SIGNIFICANT CHANGE UP (ref 27–34)
MCHC RBC-ENTMCNC: 35.5 G/DL — SIGNIFICANT CHANGE UP (ref 32–36)
MCV RBC AUTO: 90.1 FL — SIGNIFICANT CHANGE UP (ref 80–100)
MONOCYTES # BLD AUTO: 0.4 K/UL — SIGNIFICANT CHANGE UP (ref 0–0.9)
MONOCYTES NFR BLD AUTO: 6.1 % — SIGNIFICANT CHANGE UP (ref 2–14)
NEUTROPHILS # BLD AUTO: 3.5 K/UL — SIGNIFICANT CHANGE UP (ref 1.8–7.4)
NEUTROPHILS NFR BLD AUTO: 58.7 % — SIGNIFICANT CHANGE UP (ref 43–77)
PLATELET # BLD AUTO: 185 K/UL — SIGNIFICANT CHANGE UP (ref 150–400)
RBC # BLD: 3.59 M/UL — LOW (ref 3.8–5.2)
RBC # FLD: 13.6 % — SIGNIFICANT CHANGE UP (ref 10.3–14.5)
WBC # BLD: 5.9 K/UL — SIGNIFICANT CHANGE UP (ref 3.8–10.5)
WBC # FLD AUTO: 5.9 K/UL — SIGNIFICANT CHANGE UP (ref 3.8–10.5)

## 2017-11-10 PROCEDURE — 77387B: CUSTOM | Mod: 26

## 2017-11-13 DIAGNOSIS — Z51.11 ENCOUNTER FOR ANTINEOPLASTIC CHEMOTHERAPY: ICD-10-CM

## 2017-11-13 DIAGNOSIS — R11.2 NAUSEA WITH VOMITING, UNSPECIFIED: ICD-10-CM

## 2017-11-13 PROCEDURE — 77427 RADIATION TX MANAGEMENT X5: CPT

## 2017-11-13 PROCEDURE — 77387B: CUSTOM | Mod: 26

## 2017-11-14 ENCOUNTER — APPOINTMENT (OUTPATIENT)
Dept: INFUSION THERAPY | Facility: HOSPITAL | Age: 77
End: 2017-11-14

## 2017-11-14 ENCOUNTER — APPOINTMENT (OUTPATIENT)
Dept: HEMATOLOGY ONCOLOGY | Facility: CLINIC | Age: 77
End: 2017-11-14

## 2017-11-14 PROCEDURE — 77387B: CUSTOM | Mod: 26

## 2017-11-15 PROCEDURE — 77387B: CUSTOM | Mod: 26

## 2017-11-16 VITALS
RESPIRATION RATE: 17 BRPM | WEIGHT: 126.65 LBS | TEMPERATURE: 98.1 F | HEIGHT: 62 IN | BODY MASS INDEX: 23.31 KG/M2 | OXYGEN SATURATION: 94 % | SYSTOLIC BLOOD PRESSURE: 134 MMHG | DIASTOLIC BLOOD PRESSURE: 77 MMHG | HEART RATE: 98 BPM

## 2017-11-16 PROCEDURE — 77387B: CUSTOM | Mod: 26

## 2017-11-17 ENCOUNTER — RESULT REVIEW (OUTPATIENT)
Age: 77
End: 2017-11-17

## 2017-11-17 ENCOUNTER — LABORATORY RESULT (OUTPATIENT)
Age: 77
End: 2017-11-17

## 2017-11-17 ENCOUNTER — APPOINTMENT (OUTPATIENT)
Dept: INFUSION THERAPY | Facility: HOSPITAL | Age: 77
End: 2017-11-17

## 2017-11-17 LAB
BASOPHILS # BLD AUTO: 0 K/UL — SIGNIFICANT CHANGE UP (ref 0–0.2)
BASOPHILS NFR BLD AUTO: 0.3 % — SIGNIFICANT CHANGE UP (ref 0–2)
EOSINOPHIL # BLD AUTO: 0.1 K/UL — SIGNIFICANT CHANGE UP (ref 0–0.5)
EOSINOPHIL NFR BLD AUTO: 2.8 % — SIGNIFICANT CHANGE UP (ref 0–6)
HCT VFR BLD CALC: 32.5 % — LOW (ref 34.5–45)
HGB BLD-MCNC: 11.5 G/DL — SIGNIFICANT CHANGE UP (ref 11.5–15.5)
LYMPHOCYTES # BLD AUTO: 1.3 K/UL — SIGNIFICANT CHANGE UP (ref 1–3.3)
LYMPHOCYTES # BLD AUTO: 27.6 % — SIGNIFICANT CHANGE UP (ref 13–44)
MCHC RBC-ENTMCNC: 31.5 PG — SIGNIFICANT CHANGE UP (ref 27–34)
MCHC RBC-ENTMCNC: 35.3 G/DL — SIGNIFICANT CHANGE UP (ref 32–36)
MCV RBC AUTO: 89.2 FL — SIGNIFICANT CHANGE UP (ref 80–100)
MONOCYTES # BLD AUTO: 0.3 K/UL — SIGNIFICANT CHANGE UP (ref 0–0.9)
MONOCYTES NFR BLD AUTO: 7.3 % — SIGNIFICANT CHANGE UP (ref 2–14)
NEUTROPHILS # BLD AUTO: 2.9 K/UL — SIGNIFICANT CHANGE UP (ref 1.8–7.4)
NEUTROPHILS NFR BLD AUTO: 62 % — SIGNIFICANT CHANGE UP (ref 43–77)
PLATELET # BLD AUTO: 183 K/UL — SIGNIFICANT CHANGE UP (ref 150–400)
RBC # BLD: 3.64 M/UL — LOW (ref 3.8–5.2)
RBC # FLD: 13.7 % — SIGNIFICANT CHANGE UP (ref 10.3–14.5)
WBC # BLD: 4.6 K/UL — SIGNIFICANT CHANGE UP (ref 3.8–10.5)
WBC # FLD AUTO: 4.6 K/UL — SIGNIFICANT CHANGE UP (ref 3.8–10.5)

## 2017-11-17 PROCEDURE — 77387B: CUSTOM | Mod: 26

## 2017-11-20 PROCEDURE — 77427 RADIATION TX MANAGEMENT X5: CPT

## 2017-11-20 PROCEDURE — 77387B: CUSTOM | Mod: 26

## 2017-11-21 ENCOUNTER — APPOINTMENT (OUTPATIENT)
Dept: INFUSION THERAPY | Facility: HOSPITAL | Age: 77
End: 2017-11-21

## 2017-11-21 VITALS
BODY MASS INDEX: 23.67 KG/M2 | RESPIRATION RATE: 17 BRPM | WEIGHT: 128.64 LBS | OXYGEN SATURATION: 96 % | HEART RATE: 95 BPM | DIASTOLIC BLOOD PRESSURE: 69 MMHG | HEIGHT: 62 IN | SYSTOLIC BLOOD PRESSURE: 134 MMHG | TEMPERATURE: 97.8 F

## 2017-11-21 PROCEDURE — 77387B: CUSTOM | Mod: 26

## 2017-11-22 PROCEDURE — 77387B: CUSTOM | Mod: 26

## 2017-11-24 ENCOUNTER — RESULT REVIEW (OUTPATIENT)
Age: 77
End: 2017-11-24

## 2017-11-24 ENCOUNTER — APPOINTMENT (OUTPATIENT)
Dept: INFUSION THERAPY | Facility: HOSPITAL | Age: 77
End: 2017-11-24

## 2017-11-24 ENCOUNTER — APPOINTMENT (OUTPATIENT)
Dept: HEMATOLOGY ONCOLOGY | Facility: CLINIC | Age: 77
End: 2017-11-24
Payer: MEDICARE

## 2017-11-24 ENCOUNTER — LABORATORY RESULT (OUTPATIENT)
Age: 77
End: 2017-11-24

## 2017-11-24 LAB
BASOPHILS # BLD AUTO: 0 K/UL — SIGNIFICANT CHANGE UP (ref 0–0.2)
BASOPHILS NFR BLD AUTO: 0.1 % — SIGNIFICANT CHANGE UP (ref 0–2)
EOSINOPHIL # BLD AUTO: 0.1 K/UL — SIGNIFICANT CHANGE UP (ref 0–0.5)
EOSINOPHIL NFR BLD AUTO: 3 % — SIGNIFICANT CHANGE UP (ref 0–6)
HCT VFR BLD CALC: 31.3 % — LOW (ref 34.5–45)
HGB BLD-MCNC: 10.8 G/DL — LOW (ref 11.5–15.5)
LYMPHOCYTES # BLD AUTO: 0.9 K/UL — LOW (ref 1–3.3)
LYMPHOCYTES # BLD AUTO: 26.6 % — SIGNIFICANT CHANGE UP (ref 13–44)
MCHC RBC-ENTMCNC: 31 PG — SIGNIFICANT CHANGE UP (ref 27–34)
MCHC RBC-ENTMCNC: 34.6 G/DL — SIGNIFICANT CHANGE UP (ref 32–36)
MCV RBC AUTO: 89.6 FL — SIGNIFICANT CHANGE UP (ref 80–100)
MONOCYTES # BLD AUTO: 0.4 K/UL — SIGNIFICANT CHANGE UP (ref 0–0.9)
MONOCYTES NFR BLD AUTO: 12.4 % — SIGNIFICANT CHANGE UP (ref 2–14)
NEUTROPHILS # BLD AUTO: 2.1 K/UL — SIGNIFICANT CHANGE UP (ref 1.8–7.4)
NEUTROPHILS NFR BLD AUTO: 57.9 % — SIGNIFICANT CHANGE UP (ref 43–77)
PLATELET # BLD AUTO: 172 K/UL — SIGNIFICANT CHANGE UP (ref 150–400)
RBC # BLD: 3.49 M/UL — LOW (ref 3.8–5.2)
RBC # FLD: 14.1 % — SIGNIFICANT CHANGE UP (ref 10.3–14.5)
WBC # BLD: 3.6 K/UL — LOW (ref 3.8–10.5)
WBC # FLD AUTO: 3.6 K/UL — LOW (ref 3.8–10.5)

## 2017-11-24 PROCEDURE — 77387B: CUSTOM | Mod: 26

## 2017-11-24 PROCEDURE — 99215 OFFICE O/P EST HI 40 MIN: CPT

## 2017-11-24 RX ORDER — ACETAMINOPHEN 500 MG
2 TABLET ORAL
Qty: 0 | Refills: 0 | COMMUNITY
End: 2017-09-25

## 2017-11-27 PROCEDURE — 77387B: CUSTOM | Mod: 26

## 2017-11-28 ENCOUNTER — APPOINTMENT (OUTPATIENT)
Dept: INFUSION THERAPY | Facility: HOSPITAL | Age: 77
End: 2017-11-28

## 2017-11-28 PROCEDURE — 77387B: CUSTOM | Mod: 26

## 2017-11-28 PROCEDURE — 77427 RADIATION TX MANAGEMENT X5: CPT

## 2017-11-29 PROCEDURE — 77387B: CUSTOM | Mod: 26

## 2017-11-30 VITALS
OXYGEN SATURATION: 95 % | WEIGHT: 128.42 LBS | HEART RATE: 98 BPM | DIASTOLIC BLOOD PRESSURE: 72 MMHG | HEIGHT: 62 IN | RESPIRATION RATE: 17 BRPM | BODY MASS INDEX: 23.63 KG/M2 | TEMPERATURE: 97.6 F | SYSTOLIC BLOOD PRESSURE: 121 MMHG

## 2017-11-30 PROCEDURE — 77387B: CUSTOM | Mod: 26

## 2017-12-01 ENCOUNTER — RESULT REVIEW (OUTPATIENT)
Age: 77
End: 2017-12-01

## 2017-12-01 ENCOUNTER — APPOINTMENT (OUTPATIENT)
Dept: INFUSION THERAPY | Facility: HOSPITAL | Age: 77
End: 2017-12-01

## 2017-12-01 ENCOUNTER — LABORATORY RESULT (OUTPATIENT)
Age: 77
End: 2017-12-01

## 2017-12-01 ENCOUNTER — OUTPATIENT (OUTPATIENT)
Dept: OUTPATIENT SERVICES | Facility: HOSPITAL | Age: 77
LOS: 1 days | Discharge: HOME | End: 2017-12-01

## 2017-12-01 DIAGNOSIS — C34.12 MALIGNANT NEOPLASM OF UPPER LOBE, LEFT BRONCHUS OR LUNG: ICD-10-CM

## 2017-12-01 LAB
BASOPHILS # BLD AUTO: 0 K/UL — SIGNIFICANT CHANGE UP (ref 0–0.2)
BASOPHILS NFR BLD AUTO: 0.3 % — SIGNIFICANT CHANGE UP (ref 0–2)
EOSINOPHIL # BLD AUTO: 0 K/UL — SIGNIFICANT CHANGE UP (ref 0–0.5)
EOSINOPHIL NFR BLD AUTO: 1.1 % — SIGNIFICANT CHANGE UP (ref 0–6)
HCT VFR BLD CALC: 32.2 % — LOW (ref 34.5–45)
HGB BLD-MCNC: 11.3 G/DL — LOW (ref 11.5–15.5)
LYMPHOCYTES # BLD AUTO: 0.7 K/UL — LOW (ref 1–3.3)
LYMPHOCYTES # BLD AUTO: 20.5 % — SIGNIFICANT CHANGE UP (ref 13–44)
MCHC RBC-ENTMCNC: 31.8 PG — SIGNIFICANT CHANGE UP (ref 27–34)
MCHC RBC-ENTMCNC: 35.2 G/DL — SIGNIFICANT CHANGE UP (ref 32–36)
MCV RBC AUTO: 90.4 FL — SIGNIFICANT CHANGE UP (ref 80–100)
MONOCYTES # BLD AUTO: 0.5 K/UL — SIGNIFICANT CHANGE UP (ref 0–0.9)
MONOCYTES NFR BLD AUTO: 13 % — SIGNIFICANT CHANGE UP (ref 2–14)
NEUTROPHILS # BLD AUTO: 2.2 K/UL — SIGNIFICANT CHANGE UP (ref 1.8–7.4)
NEUTROPHILS NFR BLD AUTO: 65 % — SIGNIFICANT CHANGE UP (ref 43–77)
PLATELET # BLD AUTO: 195 K/UL — SIGNIFICANT CHANGE UP (ref 150–400)
RBC # BLD: 3.56 M/UL — LOW (ref 3.8–5.2)
RBC # FLD: 14.6 % — HIGH (ref 10.3–14.5)
WBC # BLD: 3.5 K/UL — LOW (ref 3.8–10.5)
WBC # FLD AUTO: 3.5 K/UL — LOW (ref 3.8–10.5)

## 2017-12-01 PROCEDURE — 77387B: CUSTOM | Mod: 26

## 2017-12-04 DIAGNOSIS — E86.0 DEHYDRATION: ICD-10-CM

## 2017-12-04 DIAGNOSIS — Z51.11 ENCOUNTER FOR ANTINEOPLASTIC CHEMOTHERAPY: ICD-10-CM

## 2017-12-04 DIAGNOSIS — R11.2 NAUSEA WITH VOMITING, UNSPECIFIED: ICD-10-CM

## 2017-12-04 PROCEDURE — 77387B: CUSTOM | Mod: 26

## 2017-12-05 ENCOUNTER — APPOINTMENT (OUTPATIENT)
Dept: INFUSION THERAPY | Facility: HOSPITAL | Age: 77
End: 2017-12-05

## 2017-12-05 PROCEDURE — 77427 RADIATION TX MANAGEMENT X5: CPT

## 2017-12-05 PROCEDURE — 77387B: CUSTOM | Mod: 26

## 2017-12-06 PROCEDURE — 77387B: CUSTOM | Mod: 26

## 2017-12-07 ENCOUNTER — APPOINTMENT (OUTPATIENT)
Dept: OTOLARYNGOLOGY | Facility: CLINIC | Age: 77
End: 2017-12-07
Payer: MEDICARE

## 2017-12-07 VITALS
DIASTOLIC BLOOD PRESSURE: 68 MMHG | BODY MASS INDEX: 23.37 KG/M2 | HEART RATE: 102 BPM | SYSTOLIC BLOOD PRESSURE: 130 MMHG | HEIGHT: 62 IN | RESPIRATION RATE: 18 BRPM | WEIGHT: 127 LBS

## 2017-12-07 VITALS
WEIGHT: 127.32 LBS | RESPIRATION RATE: 18 BRPM | DIASTOLIC BLOOD PRESSURE: 68 MMHG | OXYGEN SATURATION: 98 % | SYSTOLIC BLOOD PRESSURE: 130 MMHG | HEART RATE: 102 BPM | BODY MASS INDEX: 23.29 KG/M2

## 2017-12-07 PROCEDURE — 77387B: CUSTOM | Mod: 26

## 2017-12-07 PROCEDURE — 99204 OFFICE O/P NEW MOD 45 MIN: CPT | Mod: 25

## 2017-12-07 PROCEDURE — 31579 LARYNGOSCOPY TELESCOPIC: CPT

## 2017-12-08 ENCOUNTER — APPOINTMENT (OUTPATIENT)
Dept: INFUSION THERAPY | Facility: HOSPITAL | Age: 77
End: 2017-12-08

## 2017-12-08 ENCOUNTER — RESULT REVIEW (OUTPATIENT)
Age: 77
End: 2017-12-08

## 2017-12-08 ENCOUNTER — APPOINTMENT (OUTPATIENT)
Dept: HEMATOLOGY ONCOLOGY | Facility: CLINIC | Age: 77
End: 2017-12-08
Payer: MEDICARE

## 2017-12-08 ENCOUNTER — LABORATORY RESULT (OUTPATIENT)
Age: 77
End: 2017-12-08

## 2017-12-08 LAB
BASOPHILS # BLD AUTO: 0 K/UL — SIGNIFICANT CHANGE UP (ref 0–0.2)
BASOPHILS NFR BLD AUTO: 0.9 % — SIGNIFICANT CHANGE UP (ref 0–2)
EOSINOPHIL # BLD AUTO: 0 K/UL — SIGNIFICANT CHANGE UP (ref 0–0.5)
EOSINOPHIL NFR BLD AUTO: 1.2 % — SIGNIFICANT CHANGE UP (ref 0–6)
HCT VFR BLD CALC: 31.4 % — LOW (ref 34.5–45)
HGB BLD-MCNC: 10.8 G/DL — LOW (ref 11.5–15.5)
LYMPHOCYTES # BLD AUTO: 0.8 K/UL — LOW (ref 1–3.3)
LYMPHOCYTES # BLD AUTO: 24 % — SIGNIFICANT CHANGE UP (ref 13–44)
MCHC RBC-ENTMCNC: 31 PG — SIGNIFICANT CHANGE UP (ref 27–34)
MCHC RBC-ENTMCNC: 34.4 G/DL — SIGNIFICANT CHANGE UP (ref 32–36)
MCV RBC AUTO: 90.1 FL — SIGNIFICANT CHANGE UP (ref 80–100)
MONOCYTES # BLD AUTO: 0.4 K/UL — SIGNIFICANT CHANGE UP (ref 0–0.9)
MONOCYTES NFR BLD AUTO: 12.3 % — SIGNIFICANT CHANGE UP (ref 2–14)
NEUTROPHILS # BLD AUTO: 2 K/UL — SIGNIFICANT CHANGE UP (ref 1.8–7.4)
NEUTROPHILS NFR BLD AUTO: 61.6 % — SIGNIFICANT CHANGE UP (ref 43–77)
PLATELET # BLD AUTO: 192 K/UL — SIGNIFICANT CHANGE UP (ref 150–400)
RBC # BLD: 3.48 M/UL — LOW (ref 3.8–5.2)
RBC # FLD: 14.9 % — HIGH (ref 10.3–14.5)
WBC # BLD: 3.2 K/UL — LOW (ref 3.8–10.5)
WBC # FLD AUTO: 3.2 K/UL — LOW (ref 3.8–10.5)

## 2017-12-08 PROCEDURE — 99214 OFFICE O/P EST MOD 30 MIN: CPT

## 2017-12-08 PROCEDURE — 77387B: CUSTOM | Mod: 26

## 2017-12-11 PROCEDURE — 77387B: CUSTOM | Mod: 26

## 2017-12-12 ENCOUNTER — APPOINTMENT (OUTPATIENT)
Dept: INFUSION THERAPY | Facility: HOSPITAL | Age: 77
End: 2017-12-12

## 2017-12-12 PROCEDURE — 77427 RADIATION TX MANAGEMENT X5: CPT

## 2017-12-12 PROCEDURE — 77387B: CUSTOM | Mod: 26

## 2017-12-13 PROCEDURE — 77387B: CUSTOM | Mod: 26

## 2017-12-14 VITALS
WEIGHT: 126.32 LBS | TEMPERATURE: 98.2 F | HEIGHT: 62 IN | SYSTOLIC BLOOD PRESSURE: 110 MMHG | BODY MASS INDEX: 23.25 KG/M2 | OXYGEN SATURATION: 96 % | RESPIRATION RATE: 18 BRPM | HEART RATE: 108 BPM | DIASTOLIC BLOOD PRESSURE: 68 MMHG

## 2017-12-14 PROCEDURE — 77387B: CUSTOM | Mod: 26

## 2017-12-15 ENCOUNTER — LABORATORY RESULT (OUTPATIENT)
Age: 77
End: 2017-12-15

## 2017-12-15 ENCOUNTER — RESULT REVIEW (OUTPATIENT)
Age: 77
End: 2017-12-15

## 2017-12-15 ENCOUNTER — APPOINTMENT (OUTPATIENT)
Dept: INFUSION THERAPY | Facility: HOSPITAL | Age: 77
End: 2017-12-15

## 2017-12-15 ENCOUNTER — OUTPATIENT (OUTPATIENT)
Dept: OUTPATIENT SERVICES | Facility: HOSPITAL | Age: 77
LOS: 1 days | End: 2017-12-15
Payer: MEDICARE

## 2017-12-15 VITALS
TEMPERATURE: 99 F | HEIGHT: 62.25 IN | HEART RATE: 110 BPM | WEIGHT: 123.02 LBS | DIASTOLIC BLOOD PRESSURE: 64 MMHG | SYSTOLIC BLOOD PRESSURE: 126 MMHG | RESPIRATION RATE: 18 BRPM

## 2017-12-15 DIAGNOSIS — C34.90 MALIGNANT NEOPLASM OF UNSPECIFIED PART OF UNSPECIFIED BRONCHUS OR LUNG: ICD-10-CM

## 2017-12-15 DIAGNOSIS — J38.01 PARALYSIS OF VOCAL CORDS AND LARYNX, UNILATERAL: ICD-10-CM

## 2017-12-15 DIAGNOSIS — Z98.890 OTHER SPECIFIED POSTPROCEDURAL STATES: Chronic | ICD-10-CM

## 2017-12-15 DIAGNOSIS — J38.00 PARALYSIS OF VOCAL CORDS AND LARYNX, UNSPECIFIED: ICD-10-CM

## 2017-12-15 LAB
BASOPHILS # BLD AUTO: 0 K/UL — SIGNIFICANT CHANGE UP (ref 0–0.2)
BASOPHILS NFR BLD AUTO: 0.6 % — SIGNIFICANT CHANGE UP (ref 0–2)
BUN SERPL-MCNC: 11 MG/DL — SIGNIFICANT CHANGE UP (ref 7–23)
CALCIUM SERPL-MCNC: 8.6 MG/DL — SIGNIFICANT CHANGE UP (ref 8.4–10.5)
CHLORIDE SERPL-SCNC: 104 MMOL/L — SIGNIFICANT CHANGE UP (ref 98–107)
CO2 SERPL-SCNC: 23 MMOL/L — SIGNIFICANT CHANGE UP (ref 22–31)
CREAT SERPL-MCNC: 0.68 MG/DL — SIGNIFICANT CHANGE UP (ref 0.5–1.3)
EOSINOPHIL # BLD AUTO: 0.1 K/UL — SIGNIFICANT CHANGE UP (ref 0–0.5)
EOSINOPHIL NFR BLD AUTO: 2.8 % — SIGNIFICANT CHANGE UP (ref 0–6)
GLUCOSE SERPL-MCNC: 176 MG/DL — HIGH (ref 70–99)
HCT VFR BLD CALC: 30.6 % — LOW (ref 34.5–45)
HCT VFR BLD CALC: 32 % — LOW (ref 34.5–45)
HGB BLD-MCNC: 10.5 G/DL — LOW (ref 11.5–15.5)
HGB BLD-MCNC: 10.8 G/DL — LOW (ref 11.5–15.5)
LYMPHOCYTES # BLD AUTO: 0.5 K/UL — LOW (ref 1–3.3)
LYMPHOCYTES # BLD AUTO: 12.9 % — LOW (ref 13–44)
MCHC RBC-ENTMCNC: 30 PG — SIGNIFICANT CHANGE UP (ref 27–34)
MCHC RBC-ENTMCNC: 32.3 PG — SIGNIFICANT CHANGE UP (ref 27–34)
MCHC RBC-ENTMCNC: 32.8 % — SIGNIFICANT CHANGE UP (ref 32–36)
MCHC RBC-ENTMCNC: 35.4 G/DL — SIGNIFICANT CHANGE UP (ref 32–36)
MCV RBC AUTO: 91.3 FL — SIGNIFICANT CHANGE UP (ref 80–100)
MCV RBC AUTO: 91.4 FL — SIGNIFICANT CHANGE UP (ref 80–100)
MONOCYTES # BLD AUTO: 0.4 K/UL — SIGNIFICANT CHANGE UP (ref 0–0.9)
MONOCYTES NFR BLD AUTO: 10.7 % — SIGNIFICANT CHANGE UP (ref 2–14)
NEUTROPHILS # BLD AUTO: 2.6 K/UL — SIGNIFICANT CHANGE UP (ref 1.8–7.4)
NEUTROPHILS NFR BLD AUTO: 73 % — SIGNIFICANT CHANGE UP (ref 43–77)
NRBC # FLD: 0 — SIGNIFICANT CHANGE UP
PLATELET # BLD AUTO: 181 K/UL — SIGNIFICANT CHANGE UP (ref 150–400)
PLATELET # BLD AUTO: 197 K/UL — SIGNIFICANT CHANGE UP (ref 150–400)
PMV BLD: 10.5 FL — SIGNIFICANT CHANGE UP (ref 7–13)
POTASSIUM SERPL-MCNC: 3.5 MMOL/L — SIGNIFICANT CHANGE UP (ref 3.5–5.3)
POTASSIUM SERPL-SCNC: 3.5 MMOL/L — SIGNIFICANT CHANGE UP (ref 3.5–5.3)
RBC # BLD: 3.36 M/UL — LOW (ref 3.8–5.2)
RBC # BLD: 3.5 M/UL — LOW (ref 3.8–5.2)
RBC # FLD: 15 % — HIGH (ref 10.3–14.5)
RBC # FLD: 17.1 % — HIGH (ref 10.3–14.5)
SODIUM SERPL-SCNC: 141 MMOL/L — SIGNIFICANT CHANGE UP (ref 135–145)
WBC # BLD: 3.36 K/UL — LOW (ref 3.8–10.5)
WBC # BLD: 3.6 K/UL — LOW (ref 3.8–10.5)
WBC # FLD AUTO: 3.36 K/UL — LOW (ref 3.8–10.5)
WBC # FLD AUTO: 3.6 K/UL — LOW (ref 3.8–10.5)

## 2017-12-15 PROCEDURE — 93010 ELECTROCARDIOGRAM REPORT: CPT

## 2017-12-15 PROCEDURE — 77387B: CUSTOM | Mod: 26

## 2017-12-15 RX ORDER — SODIUM CHLORIDE 9 MG/ML
1000 INJECTION, SOLUTION INTRAVENOUS
Qty: 0 | Refills: 0 | Status: DISCONTINUED | OUTPATIENT
Start: 2017-12-22 | End: 2018-01-06

## 2017-12-15 NOTE — H&P PST ADULT - MUSCULOSKELETAL
detailed exam normal strength/no joint swelling/no joint erythema/ROM intact/no joint warmth/no calf tenderness details… decreased ROM/no joint swelling/no joint erythema/no joint warmth/normal strength/no calf tenderness

## 2017-12-15 NOTE — H&P PST ADULT - ENMT COMMENTS
difficulty drinking thin liquids, eating pureed diet Hoarseness of voice and difficulty in speaking noted

## 2017-12-15 NOTE — H&P PST ADULT - PROBLEM SELECTOR PLAN 2
Pt instructed to take synthroid AM of surgery with a sip of water. Pt receiving chemo and radiation. Pt had chemo and radiation today 12/15/17.   Pt is tachy  and temp 37.4.  Pt advised to obtain clearance from pt's oncologist - will obtain copy

## 2017-12-15 NOTE — H&P PST ADULT - NEGATIVE MUSCULOSKELETAL SYMPTOMS
no joint swelling/no myalgia/no muscle cramps/no neck pain/no arm pain L/no muscle weakness/no arthralgia/no leg pain R/no stiffness/no arm pain R/no leg pain L

## 2017-12-15 NOTE — H&P PST ADULT - RS GEN PE MLT RESP DETAILS PC
no rhonchi/no subcutaneous emphysema/no wheezes/airway patent/respirations non-labored/clear to auscultation bilaterally/no intercostal retractions/no rales/good air movement/no chest wall tenderness/breath sounds equal no rales/no wheezes/no chest wall tenderness/no intercostal retractions/breath sounds equal/good air movement/no rhonchi/airway patent/no subcutaneous emphysema/respirations non-labored

## 2017-12-15 NOTE — H&P PST ADULT - NSANTHOSAYNRD_GEN_A_CORE
No. DEION screening performed.  STOP BANG Legend: 0-2 = LOW Risk; 3-4 = INTERMEDIATE Risk; 5-8 = HIGH Risk never tested/No. DEION screening performed.  STOP BANG Legend: 0-2 = LOW Risk; 3-4 = INTERMEDIATE Risk; 5-8 = HIGH Risk

## 2017-12-15 NOTE — H&P PST ADULT - PROBLEM SELECTOR PLAN 1
Pt is scheduled for flexible bronchoscopy, endobronchial ultrasound biopsy on 9/7/17.   Pre op instructions including chlorhexidine wash given and pt able to verbalize understanding. Pt scheduled for injection laryngoplasty for vocal cord paralysis on 12/22/17.  abs pending,  EKG in chart,   Preop instructions provided to pt

## 2017-12-15 NOTE — H&P PST ADULT - HISTORY OF PRESENT ILLNESS
78 yo female with PMH hypothyroidism, GERD, hypertension, and asthma and  cough for over 1 year.  Pt reports she started to develop hoarseness in voice about few month ago, and was diagnosed with partial vocal cord paralysis.  Pt had f/u CT that showed pulmonary mass. Pt had flexible bronchoscopy, endobronchial ultrasound biopsy on 9/7/17 that confirmed lung malignancy and pt received chemo and radiation. Pt presents for preop eval to have injection laryngoplasty for vocal cord paralysis on 12/22/17.

## 2017-12-15 NOTE — H&P PST ADULT - ASSESSMENT
other nonspecific abnormal findings in lung field 78 yo female with PMH hypothyroidism, GERD, hypertension, and asthma and  cough for over 1 year.  Pt reports she started to develop hoarseness in voice about few month ago, and was diagnosed with partial vocal cord paralysis.  Pt had f/u CT that showed pulmonary mass. Pt had flexible bronchoscopy, endobronchial ultrasound biopsy on 9/7/17 that confirmed lung malignancy and pt received chemo and radiation. Pt presents for preop eval to have injection laryngoplasty for vocal cord paralysis on 12/22/17. 78 yo female with PMH hypothyroidism, GERD, hypertension, and asthma and  with cough for over 1 year.  Pt reports she started to develop hoarseness in voice about few month ago, and was diagnosed with partial vocal cord paralysis.   Pt presents for preop eval to have injection laryngoplasty for vocal cord paralysis on 12/22/17.

## 2017-12-15 NOTE — H&P PST ADULT - NEGATIVE FEMALE-SPECIFIC SYMPTOMS
no spotting/no abnormal vaginal bleeding/no pelvic pain postmenopausal/no abnormal vaginal bleeding/no spotting/no pelvic pain

## 2017-12-15 NOTE — H&P PST ADULT - NEGATIVE NEUROLOGICAL SYMPTOMS
no transient paralysis/no vertigo/no loss of sensation/no weakness/no generalized seizures/no headache/no difficulty walking

## 2017-12-15 NOTE — H&P PST ADULT - NEGATIVE RESPIRATORY AND THORAX SYMPTOMS
no dyspnea/no wheezing/no hemoptysis/no pleuritic chest pain no wheezing/no hemoptysis/no pleuritic chest pain

## 2017-12-15 NOTE — H&P PST ADULT - NEGATIVE CARDIOVASCULAR SYMPTOMS
no dyspnea on exertion/no orthopnea/no paroxysmal nocturnal dyspnea/no palpitations/no claudication/no chest pain/no peripheral edema

## 2017-12-15 NOTE — H&P PST ADULT - PMH
Asthma    GERD (gastroesophageal reflux disease)    Hypertension    Hypothyroidism    Other nonspecific abnormal finding of lung field Asthma    GERD (gastroesophageal reflux disease)    Hypertension    Hypothyroidism    Lung cancer  left upper lobe - receiving chemo and radiation  Other nonspecific abnormal finding of lung field

## 2017-12-18 PROCEDURE — 77387B: CUSTOM | Mod: 26

## 2017-12-19 ENCOUNTER — APPOINTMENT (OUTPATIENT)
Dept: INFUSION THERAPY | Facility: HOSPITAL | Age: 77
End: 2017-12-19

## 2017-12-19 ENCOUNTER — APPOINTMENT (OUTPATIENT)
Dept: HEMATOLOGY ONCOLOGY | Facility: CLINIC | Age: 77
End: 2017-12-19
Payer: MEDICARE

## 2017-12-19 VITALS
SYSTOLIC BLOOD PRESSURE: 130 MMHG | OXYGEN SATURATION: 95 % | RESPIRATION RATE: 18 BRPM | WEIGHT: 126.77 LBS | BODY MASS INDEX: 23.19 KG/M2 | DIASTOLIC BLOOD PRESSURE: 70 MMHG | HEART RATE: 119 BPM | TEMPERATURE: 98 F

## 2017-12-19 PROCEDURE — 99215 OFFICE O/P EST HI 40 MIN: CPT

## 2017-12-21 ENCOUNTER — APPOINTMENT (OUTPATIENT)
Dept: INFUSION THERAPY | Facility: HOSPITAL | Age: 77
End: 2017-12-21

## 2017-12-21 NOTE — ASU PATIENT PROFILE, ADULT - PMH
Asthma    GERD (gastroesophageal reflux disease)    Hypertension    Hypothyroidism    Lung cancer  left upper lobe - receiving chemo and radiation  Other nonspecific abnormal finding of lung field

## 2017-12-22 ENCOUNTER — TRANSCRIPTION ENCOUNTER (OUTPATIENT)
Age: 77
End: 2017-12-22

## 2017-12-22 ENCOUNTER — OUTPATIENT (OUTPATIENT)
Dept: OUTPATIENT SERVICES | Facility: HOSPITAL | Age: 77
LOS: 1 days | Discharge: HOME | End: 2017-12-22
Payer: MEDICARE

## 2017-12-22 ENCOUNTER — APPOINTMENT (OUTPATIENT)
Dept: OTOLARYNGOLOGY | Facility: HOSPITAL | Age: 77
End: 2017-12-22

## 2017-12-22 VITALS
RESPIRATION RATE: 22 BRPM | DIASTOLIC BLOOD PRESSURE: 58 MMHG | OXYGEN SATURATION: 97 % | HEART RATE: 97 BPM | SYSTOLIC BLOOD PRESSURE: 117 MMHG

## 2017-12-22 VITALS
RESPIRATION RATE: 16 BRPM | TEMPERATURE: 98 F | SYSTOLIC BLOOD PRESSURE: 123 MMHG | DIASTOLIC BLOOD PRESSURE: 55 MMHG | HEART RATE: 102 BPM | OXYGEN SATURATION: 100 % | WEIGHT: 123.02 LBS | HEIGHT: 62.25 IN

## 2017-12-22 DIAGNOSIS — Z98.890 OTHER SPECIFIED POSTPROCEDURAL STATES: Chronic | ICD-10-CM

## 2017-12-22 DIAGNOSIS — J38.01 PARALYSIS OF VOCAL CORDS AND LARYNX, UNILATERAL: ICD-10-CM

## 2017-12-22 PROCEDURE — 31571 LARYNGOSCOP W/VC INJ + SCOPE: CPT

## 2017-12-22 RX ORDER — FENTANYL CITRATE 50 UG/ML
25 INJECTION INTRAVENOUS
Qty: 0 | Refills: 0 | Status: DISCONTINUED | OUTPATIENT
Start: 2017-12-22 | End: 2017-12-22

## 2017-12-22 RX ORDER — ONDANSETRON 8 MG/1
4 TABLET, FILM COATED ORAL ONCE
Qty: 0 | Refills: 0 | Status: DISCONTINUED | OUTPATIENT
Start: 2017-12-22 | End: 2017-12-22

## 2017-12-22 RX ADMIN — SODIUM CHLORIDE 30 MILLILITER(S): 9 INJECTION, SOLUTION INTRAVENOUS at 12:14

## 2017-12-22 NOTE — ASU DISCHARGE PLAN (ADULT/PEDIATRIC). - NOTIFY
Persistent Nausea and Vomiting/Bleeding that does not stop/Unable to Urinate/Pain not relieved by Medications/Fever greater than 101/Increased Irritability or Sluggishness

## 2017-12-22 NOTE — ASU DISCHARGE PLAN (ADULT/PEDIATRIC). - MEDICATION SUMMARY - MEDICATIONS TO TAKE
I will START or STAY ON the medications listed below when I get home from the hospital:    Tylenol 325 mg oral tablet  -- 2 tab(s) by mouth every 4 hours, As Needed  -- Indication: For Pain prn    metoclopramide 10 mg oral tablet  -- orally every 6 hours, As Needed  -- Indication: For home med    amLODIPine 10 mg oral tablet  -- 1 tab(s) by mouth once a day in am  -- Indication: For home med    omeprazole 40 mg oral delayed release capsule  -- 1 cap(s) by mouth once a day in am  -- Indication: For home med    Synthroid 75 mcg (0.075 mg) oral tablet  -- 1 tab(s) by mouth once a day in am  -- Indication: For home med

## 2017-12-22 NOTE — ASU DISCHARGE PLAN (ADULT/PEDIATRIC). - NURSING INSTRUCTIONS
Cool and warm liquids that are not irritating to the throat should be given for the first day or two. Avoid hot liquids. Avoid citrus juices and milk. Advance at your own pace starting with soft foods and advancing to a regular diet. Avoid rough and scratchy foods and foods that are difficult to chew for approximately 5 days. Avoid strenous exercise and blowing of nose. Notify MD if shortness of breath, chest pain or difficult swallowing. Make appointment with MD.

## 2017-12-26 ENCOUNTER — APPOINTMENT (OUTPATIENT)
Dept: INFUSION THERAPY | Facility: HOSPITAL | Age: 77
End: 2017-12-26

## 2017-12-29 ENCOUNTER — APPOINTMENT (OUTPATIENT)
Dept: INFUSION THERAPY | Facility: HOSPITAL | Age: 77
End: 2017-12-29

## 2018-01-02 ENCOUNTER — OUTPATIENT (OUTPATIENT)
Dept: OUTPATIENT SERVICES | Facility: HOSPITAL | Age: 78
LOS: 1 days | Discharge: HOME | End: 2018-01-02

## 2018-01-02 DIAGNOSIS — Z98.890 OTHER SPECIFIED POSTPROCEDURAL STATES: Chronic | ICD-10-CM

## 2018-01-02 DIAGNOSIS — C34.12 MALIGNANT NEOPLASM OF UPPER LOBE, LEFT BRONCHUS OR LUNG: ICD-10-CM

## 2018-01-03 ENCOUNTER — APPOINTMENT (OUTPATIENT)
Dept: HEMATOLOGY ONCOLOGY | Facility: CLINIC | Age: 78
End: 2018-01-03
Payer: MEDICARE

## 2018-01-03 ENCOUNTER — RESULT REVIEW (OUTPATIENT)
Age: 78
End: 2018-01-03

## 2018-01-03 ENCOUNTER — APPOINTMENT (OUTPATIENT)
Dept: HEMATOLOGY ONCOLOGY | Facility: CLINIC | Age: 78
End: 2018-01-03

## 2018-01-03 VITALS
WEIGHT: 122.35 LBS | HEART RATE: 116 BPM | OXYGEN SATURATION: 96 % | DIASTOLIC BLOOD PRESSURE: 72 MMHG | TEMPERATURE: 98.1 F | BODY MASS INDEX: 22.38 KG/M2 | RESPIRATION RATE: 17 BRPM | SYSTOLIC BLOOD PRESSURE: 114 MMHG

## 2018-01-03 LAB
BASOPHILS # BLD AUTO: 0.1 K/UL — SIGNIFICANT CHANGE UP (ref 0–0.2)
BASOPHILS NFR BLD AUTO: 1.1 % — SIGNIFICANT CHANGE UP (ref 0–2)
EOSINOPHIL # BLD AUTO: 0.1 K/UL — SIGNIFICANT CHANGE UP (ref 0–0.5)
EOSINOPHIL NFR BLD AUTO: 1.1 % — SIGNIFICANT CHANGE UP (ref 0–6)
HCT VFR BLD CALC: 33.6 % — LOW (ref 34.5–45)
HGB BLD-MCNC: 11.4 G/DL — LOW (ref 11.5–15.5)
LYMPHOCYTES # BLD AUTO: 1.8 K/UL — SIGNIFICANT CHANGE UP (ref 1–3.3)
LYMPHOCYTES # BLD AUTO: 25.5 % — SIGNIFICANT CHANGE UP (ref 13–44)
MCHC RBC-ENTMCNC: 31.7 PG — SIGNIFICANT CHANGE UP (ref 27–34)
MCHC RBC-ENTMCNC: 34 G/DL — SIGNIFICANT CHANGE UP (ref 32–36)
MCV RBC AUTO: 93.2 FL — SIGNIFICANT CHANGE UP (ref 80–100)
MONOCYTES # BLD AUTO: 0.8 K/UL — SIGNIFICANT CHANGE UP (ref 0–0.9)
MONOCYTES NFR BLD AUTO: 11.5 % — SIGNIFICANT CHANGE UP (ref 2–14)
NEUTROPHILS # BLD AUTO: 4.3 K/UL — SIGNIFICANT CHANGE UP (ref 1.8–7.4)
NEUTROPHILS NFR BLD AUTO: 60.9 % — SIGNIFICANT CHANGE UP (ref 43–77)
PLATELET # BLD AUTO: 175 K/UL — SIGNIFICANT CHANGE UP (ref 150–400)
RBC # BLD: 3.6 M/UL — LOW (ref 3.8–5.2)
RBC # FLD: 15.7 % — HIGH (ref 10.3–14.5)
WBC # BLD: 7.1 K/UL — SIGNIFICANT CHANGE UP (ref 3.8–10.5)
WBC # FLD AUTO: 7.1 K/UL — SIGNIFICANT CHANGE UP (ref 3.8–10.5)

## 2018-01-03 PROCEDURE — 99215 OFFICE O/P EST HI 40 MIN: CPT

## 2018-01-12 NOTE — H&P PST ADULT - NSANTHOBSERVEDRD_ENT_A_CORE
Order placed in [address] [place] [address] for referral to [facility] per Dr. Costa Senior order.     Lorena Tolliver RN  Palliative Medicine
No

## 2018-01-18 ENCOUNTER — APPOINTMENT (OUTPATIENT)
Dept: OTOLARYNGOLOGY | Facility: CLINIC | Age: 78
End: 2018-01-18
Payer: MEDICARE

## 2018-01-18 VITALS
HEART RATE: 98 BPM | HEIGHT: 62 IN | WEIGHT: 125 LBS | SYSTOLIC BLOOD PRESSURE: 113 MMHG | BODY MASS INDEX: 23 KG/M2 | DIASTOLIC BLOOD PRESSURE: 66 MMHG

## 2018-01-18 PROCEDURE — 31575 DIAGNOSTIC LARYNGOSCOPY: CPT

## 2018-01-18 PROCEDURE — 99214 OFFICE O/P EST MOD 30 MIN: CPT | Mod: 25

## 2018-01-28 ENCOUNTER — FORM ENCOUNTER (OUTPATIENT)
Age: 78
End: 2018-01-28

## 2018-01-29 ENCOUNTER — OUTPATIENT (OUTPATIENT)
Dept: OUTPATIENT SERVICES | Facility: HOSPITAL | Age: 78
LOS: 1 days | End: 2018-01-29
Payer: MEDICARE

## 2018-01-29 ENCOUNTER — APPOINTMENT (OUTPATIENT)
Dept: CT IMAGING | Facility: IMAGING CENTER | Age: 78
End: 2018-01-29
Payer: MEDICARE

## 2018-01-29 DIAGNOSIS — Z98.890 OTHER SPECIFIED POSTPROCEDURAL STATES: Chronic | ICD-10-CM

## 2018-01-29 DIAGNOSIS — C34.12 MALIGNANT NEOPLASM OF UPPER LOBE, LEFT BRONCHUS OR LUNG: ICD-10-CM

## 2018-01-29 PROCEDURE — 71250 CT THORAX DX C-: CPT | Mod: 26

## 2018-01-29 PROCEDURE — 71250 CT THORAX DX C-: CPT

## 2018-01-31 ENCOUNTER — APPOINTMENT (OUTPATIENT)
Dept: HEMATOLOGY ONCOLOGY | Facility: CLINIC | Age: 78
End: 2018-01-31
Payer: MEDICARE

## 2018-01-31 VITALS
HEART RATE: 93 BPM | WEIGHT: 123.46 LBS | BODY MASS INDEX: 22.58 KG/M2 | DIASTOLIC BLOOD PRESSURE: 62 MMHG | RESPIRATION RATE: 16 BRPM | OXYGEN SATURATION: 95 % | SYSTOLIC BLOOD PRESSURE: 123 MMHG | TEMPERATURE: 98 F

## 2018-01-31 PROCEDURE — 99215 OFFICE O/P EST HI 40 MIN: CPT

## 2018-02-01 ENCOUNTER — APPOINTMENT (OUTPATIENT)
Dept: RADIATION ONCOLOGY | Facility: CLINIC | Age: 78
End: 2018-02-01
Payer: MEDICARE

## 2018-02-01 VITALS
HEIGHT: 62 IN | SYSTOLIC BLOOD PRESSURE: 108 MMHG | TEMPERATURE: 97.8 F | BODY MASS INDEX: 23.08 KG/M2 | OXYGEN SATURATION: 95 % | RESPIRATION RATE: 16 BRPM | DIASTOLIC BLOOD PRESSURE: 66 MMHG | HEART RATE: 97 BPM | WEIGHT: 125.44 LBS

## 2018-02-01 PROCEDURE — 99024 POSTOP FOLLOW-UP VISIT: CPT

## 2018-02-22 ENCOUNTER — OUTPATIENT (OUTPATIENT)
Dept: OUTPATIENT SERVICES | Facility: HOSPITAL | Age: 78
LOS: 1 days | Discharge: HOME | End: 2018-02-22

## 2018-02-22 DIAGNOSIS — Z98.890 OTHER SPECIFIED POSTPROCEDURAL STATES: Chronic | ICD-10-CM

## 2018-02-22 DIAGNOSIS — C34.12 MALIGNANT NEOPLASM OF UPPER LOBE, LEFT BRONCHUS OR LUNG: ICD-10-CM

## 2018-02-28 ENCOUNTER — LABORATORY RESULT (OUTPATIENT)
Age: 78
End: 2018-02-28

## 2018-02-28 ENCOUNTER — RESULT REVIEW (OUTPATIENT)
Age: 78
End: 2018-02-28

## 2018-02-28 ENCOUNTER — APPOINTMENT (OUTPATIENT)
Dept: INFUSION THERAPY | Facility: HOSPITAL | Age: 78
End: 2018-02-28

## 2018-02-28 LAB
BASOPHILS # BLD AUTO: 0.1 K/UL — SIGNIFICANT CHANGE UP (ref 0–0.2)
BASOPHILS NFR BLD AUTO: 1 % — SIGNIFICANT CHANGE UP (ref 0–2)
EOSINOPHIL # BLD AUTO: 0 K/UL — SIGNIFICANT CHANGE UP (ref 0–0.5)
EOSINOPHIL NFR BLD AUTO: 0 % — SIGNIFICANT CHANGE UP (ref 0–6)
HCT VFR BLD CALC: 32.1 % — LOW (ref 34.5–45)
HGB BLD-MCNC: 11 G/DL — LOW (ref 11.5–15.5)
LYMPHOCYTES # BLD AUTO: 1.5 K/UL — SIGNIFICANT CHANGE UP (ref 1–3.3)
LYMPHOCYTES # BLD AUTO: 25.3 % — SIGNIFICANT CHANGE UP (ref 13–44)
MCHC RBC-ENTMCNC: 31.6 PG — SIGNIFICANT CHANGE UP (ref 27–34)
MCHC RBC-ENTMCNC: 34.1 G/DL — SIGNIFICANT CHANGE UP (ref 32–36)
MCV RBC AUTO: 92.6 FL — SIGNIFICANT CHANGE UP (ref 80–100)
MONOCYTES # BLD AUTO: 0.4 K/UL — SIGNIFICANT CHANGE UP (ref 0–0.9)
MONOCYTES NFR BLD AUTO: 7.4 % — SIGNIFICANT CHANGE UP (ref 2–14)
NEUTROPHILS # BLD AUTO: 4 K/UL — SIGNIFICANT CHANGE UP (ref 1.8–7.4)
NEUTROPHILS NFR BLD AUTO: 66.3 % — SIGNIFICANT CHANGE UP (ref 43–77)
PLATELET # BLD AUTO: 185 K/UL — SIGNIFICANT CHANGE UP (ref 150–400)
RBC # BLD: 3.47 M/UL — LOW (ref 3.8–5.2)
RBC # FLD: 12.5 % — SIGNIFICANT CHANGE UP (ref 10.3–14.5)
WBC # BLD: 6 K/UL — SIGNIFICANT CHANGE UP (ref 3.8–10.5)
WBC # FLD AUTO: 6 K/UL — SIGNIFICANT CHANGE UP (ref 3.8–10.5)

## 2018-03-01 DIAGNOSIS — Z51.11 ENCOUNTER FOR ANTINEOPLASTIC CHEMOTHERAPY: ICD-10-CM

## 2018-03-14 ENCOUNTER — RESULT REVIEW (OUTPATIENT)
Age: 78
End: 2018-03-14

## 2018-03-14 ENCOUNTER — APPOINTMENT (OUTPATIENT)
Dept: INFUSION THERAPY | Facility: HOSPITAL | Age: 78
End: 2018-03-14

## 2018-03-14 ENCOUNTER — LABORATORY RESULT (OUTPATIENT)
Age: 78
End: 2018-03-14

## 2018-03-14 ENCOUNTER — APPOINTMENT (OUTPATIENT)
Dept: HEMATOLOGY ONCOLOGY | Facility: CLINIC | Age: 78
End: 2018-03-14
Payer: MEDICARE

## 2018-03-14 VITALS
HEART RATE: 92 BPM | RESPIRATION RATE: 16 BRPM | BODY MASS INDEX: 22.18 KG/M2 | TEMPERATURE: 97.2 F | OXYGEN SATURATION: 95 % | WEIGHT: 121.24 LBS | DIASTOLIC BLOOD PRESSURE: 67 MMHG | SYSTOLIC BLOOD PRESSURE: 129 MMHG

## 2018-03-14 LAB
BASOPHILS # BLD AUTO: 0.1 K/UL — SIGNIFICANT CHANGE UP (ref 0–0.2)
BASOPHILS NFR BLD AUTO: 0.9 % — SIGNIFICANT CHANGE UP (ref 0–2)
EOSINOPHIL # BLD AUTO: 0.1 K/UL — SIGNIFICANT CHANGE UP (ref 0–0.5)
EOSINOPHIL NFR BLD AUTO: 1.3 % — SIGNIFICANT CHANGE UP (ref 0–6)
HCT VFR BLD CALC: 33.9 % — LOW (ref 34.5–45)
HGB BLD-MCNC: 11.3 G/DL — LOW (ref 11.5–15.5)
LYMPHOCYTES # BLD AUTO: 1.4 K/UL — SIGNIFICANT CHANGE UP (ref 1–3.3)
LYMPHOCYTES # BLD AUTO: 18.1 % — SIGNIFICANT CHANGE UP (ref 13–44)
MCHC RBC-ENTMCNC: 30.7 PG — SIGNIFICANT CHANGE UP (ref 27–34)
MCHC RBC-ENTMCNC: 33.5 G/DL — SIGNIFICANT CHANGE UP (ref 32–36)
MCV RBC AUTO: 91.6 FL — SIGNIFICANT CHANGE UP (ref 80–100)
MONOCYTES # BLD AUTO: 0.4 K/UL — SIGNIFICANT CHANGE UP (ref 0–0.9)
MONOCYTES NFR BLD AUTO: 4.8 % — SIGNIFICANT CHANGE UP (ref 2–14)
NEUTROPHILS # BLD AUTO: 5.6 K/UL — SIGNIFICANT CHANGE UP (ref 1.8–7.4)
NEUTROPHILS NFR BLD AUTO: 74.9 % — SIGNIFICANT CHANGE UP (ref 43–77)
PLATELET # BLD AUTO: 205 K/UL — SIGNIFICANT CHANGE UP (ref 150–400)
RBC # BLD: 3.7 M/UL — LOW (ref 3.8–5.2)
RBC # FLD: 12.5 % — SIGNIFICANT CHANGE UP (ref 10.3–14.5)
WBC # BLD: 7.4 K/UL — SIGNIFICANT CHANGE UP (ref 3.8–10.5)
WBC # FLD AUTO: 7.4 K/UL — SIGNIFICANT CHANGE UP (ref 3.8–10.5)

## 2018-03-14 PROCEDURE — 99215 OFFICE O/P EST HI 40 MIN: CPT

## 2018-03-23 ENCOUNTER — OUTPATIENT (OUTPATIENT)
Dept: OUTPATIENT SERVICES | Facility: HOSPITAL | Age: 78
LOS: 1 days | Discharge: HOME | End: 2018-03-23

## 2018-03-23 DIAGNOSIS — Z98.890 OTHER SPECIFIED POSTPROCEDURAL STATES: Chronic | ICD-10-CM

## 2018-03-23 DIAGNOSIS — C34.12 MALIGNANT NEOPLASM OF UPPER LOBE, LEFT BRONCHUS OR LUNG: ICD-10-CM

## 2018-03-28 ENCOUNTER — APPOINTMENT (OUTPATIENT)
Dept: INFUSION THERAPY | Facility: HOSPITAL | Age: 78
End: 2018-03-28

## 2018-03-28 ENCOUNTER — RESULT REVIEW (OUTPATIENT)
Age: 78
End: 2018-03-28

## 2018-03-28 LAB
BUN SERPL-MCNC: 15 MG/DL — SIGNIFICANT CHANGE UP (ref 7–23)
CA-I BLDA-SCNC: 1.15 MMOL/L — SIGNIFICANT CHANGE UP (ref 1.12–1.3)
CHLORIDE SERPL-SCNC: 104 MMOL/L — SIGNIFICANT CHANGE UP (ref 96–108)
CO2 SERPL-SCNC: 28 MMOL/L — SIGNIFICANT CHANGE UP (ref 22–31)
CREAT SERPL-MCNC: 0.6 MG/DL — SIGNIFICANT CHANGE UP (ref 0.5–1.3)
GLUCOSE SERPL-MCNC: 92 MG/DL — SIGNIFICANT CHANGE UP (ref 70–99)
POTASSIUM SERPL-MCNC: 3.9 MMOL/L — SIGNIFICANT CHANGE UP (ref 3.5–5.3)
POTASSIUM SERPL-SCNC: 3.9 MMOL/L — SIGNIFICANT CHANGE UP (ref 3.5–5.3)
SODIUM SERPL-SCNC: 141 MMOL/L — SIGNIFICANT CHANGE UP (ref 135–145)

## 2018-03-29 DIAGNOSIS — Z51.11 ENCOUNTER FOR ANTINEOPLASTIC CHEMOTHERAPY: ICD-10-CM

## 2018-04-11 ENCOUNTER — LABORATORY RESULT (OUTPATIENT)
Age: 78
End: 2018-04-11

## 2018-04-11 ENCOUNTER — RESULT REVIEW (OUTPATIENT)
Age: 78
End: 2018-04-11

## 2018-04-11 ENCOUNTER — APPOINTMENT (OUTPATIENT)
Dept: INFUSION THERAPY | Facility: HOSPITAL | Age: 78
End: 2018-04-11

## 2018-04-11 LAB
BASOPHILS # BLD AUTO: 0 K/UL — SIGNIFICANT CHANGE UP (ref 0–0.2)
BASOPHILS NFR BLD AUTO: 0.3 % — SIGNIFICANT CHANGE UP (ref 0–2)
EOSINOPHIL # BLD AUTO: 0.1 K/UL — SIGNIFICANT CHANGE UP (ref 0–0.5)
EOSINOPHIL NFR BLD AUTO: 1.2 % — SIGNIFICANT CHANGE UP (ref 0–6)
HCT VFR BLD CALC: 34.7 % — SIGNIFICANT CHANGE UP (ref 34.5–45)
HGB BLD-MCNC: 11.9 G/DL — SIGNIFICANT CHANGE UP (ref 11.5–15.5)
LYMPHOCYTES # BLD AUTO: 1.3 K/UL — SIGNIFICANT CHANGE UP (ref 1–3.3)
LYMPHOCYTES # BLD AUTO: 16.5 % — SIGNIFICANT CHANGE UP (ref 13–44)
MCHC RBC-ENTMCNC: 30.8 PG — SIGNIFICANT CHANGE UP (ref 27–34)
MCHC RBC-ENTMCNC: 34.3 G/DL — SIGNIFICANT CHANGE UP (ref 32–36)
MCV RBC AUTO: 89.8 FL — SIGNIFICANT CHANGE UP (ref 80–100)
MONOCYTES # BLD AUTO: 0.6 K/UL — SIGNIFICANT CHANGE UP (ref 0–0.9)
MONOCYTES NFR BLD AUTO: 6.9 % — SIGNIFICANT CHANGE UP (ref 2–14)
NEUTROPHILS # BLD AUTO: 6 K/UL — SIGNIFICANT CHANGE UP (ref 1.8–7.4)
NEUTROPHILS NFR BLD AUTO: 75.2 % — SIGNIFICANT CHANGE UP (ref 43–77)
PLATELET # BLD AUTO: 192 K/UL — SIGNIFICANT CHANGE UP (ref 150–400)
RBC # BLD: 3.87 M/UL — SIGNIFICANT CHANGE UP (ref 3.8–5.2)
RBC # FLD: 12.6 % — SIGNIFICANT CHANGE UP (ref 10.3–14.5)
WBC # BLD: 8 K/UL — SIGNIFICANT CHANGE UP (ref 3.8–10.5)
WBC # FLD AUTO: 8 K/UL — SIGNIFICANT CHANGE UP (ref 3.8–10.5)

## 2018-04-18 ENCOUNTER — APPOINTMENT (OUTPATIENT)
Dept: OTOLARYNGOLOGY | Facility: CLINIC | Age: 78
End: 2018-04-18
Payer: MEDICARE

## 2018-04-18 VITALS
HEART RATE: 84 BPM | WEIGHT: 121 LBS | DIASTOLIC BLOOD PRESSURE: 69 MMHG | BODY MASS INDEX: 22.26 KG/M2 | HEIGHT: 62 IN | SYSTOLIC BLOOD PRESSURE: 129 MMHG

## 2018-04-18 PROCEDURE — 31579 LARYNGOSCOPY TELESCOPIC: CPT

## 2018-04-18 PROCEDURE — 99214 OFFICE O/P EST MOD 30 MIN: CPT | Mod: 25

## 2018-04-18 RX ORDER — FLUTICASONE PROPIONATE AND SALMETEROL 50; 250 UG/1; UG/1
250-50 POWDER RESPIRATORY (INHALATION)
Qty: 60 | Refills: 0 | Status: DISCONTINUED | COMMUNITY
Start: 2017-03-29 | End: 2018-04-18

## 2018-04-18 RX ORDER — COMPRESSOR, FOR NEBULIZER
EACH MISCELLANEOUS
Qty: 1 | Refills: 0 | Status: DISCONTINUED | COMMUNITY
Start: 2017-05-10 | End: 2018-04-18

## 2018-04-18 RX ORDER — ALPRAZOLAM 0.25 MG/1
0.25 TABLET ORAL
Refills: 0 | Status: DISCONTINUED | COMMUNITY
End: 2018-04-18

## 2018-04-18 RX ORDER — MONTELUKAST 10 MG/1
10 TABLET, FILM COATED ORAL
Refills: 0 | Status: DISCONTINUED | COMMUNITY
End: 2018-04-18

## 2018-04-18 RX ORDER — METOCLOPRAMIDE 10 MG/1
10 TABLET ORAL
Qty: 60 | Refills: 5 | Status: DISCONTINUED | COMMUNITY
Start: 2017-10-05 | End: 2018-04-18

## 2018-04-18 RX ORDER — SUCRALFATE 1 G/10ML
1 SUSPENSION ORAL 4 TIMES DAILY
Qty: 1 | Refills: 0 | Status: DISCONTINUED | COMMUNITY
Start: 2017-11-16 | End: 2018-04-18

## 2018-04-20 ENCOUNTER — OUTPATIENT (OUTPATIENT)
Dept: OUTPATIENT SERVICES | Facility: HOSPITAL | Age: 78
LOS: 1 days | Discharge: HOME | End: 2018-04-20

## 2018-04-20 DIAGNOSIS — Z98.890 OTHER SPECIFIED POSTPROCEDURAL STATES: Chronic | ICD-10-CM

## 2018-04-20 DIAGNOSIS — C34.12 MALIGNANT NEOPLASM OF UPPER LOBE, LEFT BRONCHUS OR LUNG: ICD-10-CM

## 2018-04-22 ENCOUNTER — FORM ENCOUNTER (OUTPATIENT)
Age: 78
End: 2018-04-22

## 2018-04-23 ENCOUNTER — OUTPATIENT (OUTPATIENT)
Dept: OUTPATIENT SERVICES | Facility: HOSPITAL | Age: 78
LOS: 1 days | End: 2018-04-23
Payer: MEDICARE

## 2018-04-23 ENCOUNTER — APPOINTMENT (OUTPATIENT)
Dept: CT IMAGING | Facility: IMAGING CENTER | Age: 78
End: 2018-04-23
Payer: MEDICARE

## 2018-04-23 DIAGNOSIS — C34.12 MALIGNANT NEOPLASM OF UPPER LOBE, LEFT BRONCHUS OR LUNG: ICD-10-CM

## 2018-04-23 DIAGNOSIS — Z98.890 OTHER SPECIFIED POSTPROCEDURAL STATES: Chronic | ICD-10-CM

## 2018-04-23 PROCEDURE — 71250 CT THORAX DX C-: CPT | Mod: 26

## 2018-04-23 PROCEDURE — 71250 CT THORAX DX C-: CPT

## 2018-04-25 ENCOUNTER — LABORATORY RESULT (OUTPATIENT)
Age: 78
End: 2018-04-25

## 2018-04-25 ENCOUNTER — APPOINTMENT (OUTPATIENT)
Dept: HEMATOLOGY ONCOLOGY | Facility: CLINIC | Age: 78
End: 2018-04-25
Payer: MEDICARE

## 2018-04-25 ENCOUNTER — APPOINTMENT (OUTPATIENT)
Dept: INFUSION THERAPY | Facility: HOSPITAL | Age: 78
End: 2018-04-25

## 2018-04-25 VITALS
SYSTOLIC BLOOD PRESSURE: 120 MMHG | DIASTOLIC BLOOD PRESSURE: 70 MMHG | BODY MASS INDEX: 22.18 KG/M2 | TEMPERATURE: 98 F | RESPIRATION RATE: 16 BRPM | OXYGEN SATURATION: 99 % | WEIGHT: 121.25 LBS | HEART RATE: 94 BPM

## 2018-04-25 PROCEDURE — 99215 OFFICE O/P EST HI 40 MIN: CPT

## 2018-04-26 DIAGNOSIS — Z51.11 ENCOUNTER FOR ANTINEOPLASTIC CHEMOTHERAPY: ICD-10-CM

## 2018-05-03 ENCOUNTER — APPOINTMENT (OUTPATIENT)
Dept: RADIATION ONCOLOGY | Facility: CLINIC | Age: 78
End: 2018-05-03
Payer: MEDICARE

## 2018-05-03 VITALS
SYSTOLIC BLOOD PRESSURE: 123 MMHG | RESPIRATION RATE: 18 BRPM | DIASTOLIC BLOOD PRESSURE: 70 MMHG | OXYGEN SATURATION: 97 % | BODY MASS INDEX: 22 KG/M2 | WEIGHT: 120.26 LBS | HEART RATE: 100 BPM

## 2018-05-03 PROCEDURE — 99213 OFFICE O/P EST LOW 20 MIN: CPT

## 2018-05-16 ENCOUNTER — RESULT REVIEW (OUTPATIENT)
Age: 78
End: 2018-05-16

## 2018-05-16 ENCOUNTER — LABORATORY RESULT (OUTPATIENT)
Age: 78
End: 2018-05-16

## 2018-05-16 ENCOUNTER — APPOINTMENT (OUTPATIENT)
Dept: INFUSION THERAPY | Facility: HOSPITAL | Age: 78
End: 2018-05-16

## 2018-05-16 LAB
BASOPHILS # BLD AUTO: 0 K/UL — SIGNIFICANT CHANGE UP (ref 0–0.2)
BASOPHILS NFR BLD AUTO: 0.5 % — SIGNIFICANT CHANGE UP (ref 0–2)
EOSINOPHIL # BLD AUTO: 0.2 K/UL — SIGNIFICANT CHANGE UP (ref 0–0.5)
EOSINOPHIL NFR BLD AUTO: 1.7 % — SIGNIFICANT CHANGE UP (ref 0–6)
HCT VFR BLD CALC: 33.3 % — LOW (ref 34.5–45)
HGB BLD-MCNC: 11.5 G/DL — SIGNIFICANT CHANGE UP (ref 11.5–15.5)
LYMPHOCYTES # BLD AUTO: 1.2 K/UL — SIGNIFICANT CHANGE UP (ref 1–3.3)
LYMPHOCYTES # BLD AUTO: 12.2 % — LOW (ref 13–44)
MCHC RBC-ENTMCNC: 29.8 PG — SIGNIFICANT CHANGE UP (ref 27–34)
MCHC RBC-ENTMCNC: 34.5 G/DL — SIGNIFICANT CHANGE UP (ref 32–36)
MCV RBC AUTO: 86.4 FL — SIGNIFICANT CHANGE UP (ref 80–100)
MONOCYTES # BLD AUTO: 0.8 K/UL — SIGNIFICANT CHANGE UP (ref 0–0.9)
MONOCYTES NFR BLD AUTO: 7.9 % — SIGNIFICANT CHANGE UP (ref 2–14)
NEUTROPHILS # BLD AUTO: 7.4 K/UL — SIGNIFICANT CHANGE UP (ref 1.8–7.4)
NEUTROPHILS NFR BLD AUTO: 77.7 % — HIGH (ref 43–77)
PLATELET # BLD AUTO: 205 K/UL — SIGNIFICANT CHANGE UP (ref 150–400)
RBC # BLD: 3.85 M/UL — SIGNIFICANT CHANGE UP (ref 3.8–5.2)
RBC # FLD: 12.7 % — SIGNIFICANT CHANGE UP (ref 10.3–14.5)
WBC # BLD: 9.6 K/UL — SIGNIFICANT CHANGE UP (ref 3.8–10.5)
WBC # FLD AUTO: 9.6 K/UL — SIGNIFICANT CHANGE UP (ref 3.8–10.5)

## 2018-05-23 ENCOUNTER — OUTPATIENT (OUTPATIENT)
Dept: OUTPATIENT SERVICES | Facility: HOSPITAL | Age: 78
LOS: 1 days | Discharge: HOME | End: 2018-05-23

## 2018-05-23 DIAGNOSIS — C34.12 MALIGNANT NEOPLASM OF UPPER LOBE, LEFT BRONCHUS OR LUNG: ICD-10-CM

## 2018-05-23 DIAGNOSIS — Z98.890 OTHER SPECIFIED POSTPROCEDURAL STATES: Chronic | ICD-10-CM

## 2018-05-29 ENCOUNTER — APPOINTMENT (OUTPATIENT)
Dept: INFUSION THERAPY | Facility: HOSPITAL | Age: 78
End: 2018-05-29

## 2018-05-30 DIAGNOSIS — Z51.11 ENCOUNTER FOR ANTINEOPLASTIC CHEMOTHERAPY: ICD-10-CM

## 2018-06-12 ENCOUNTER — LABORATORY RESULT (OUTPATIENT)
Age: 78
End: 2018-06-12

## 2018-06-12 ENCOUNTER — APPOINTMENT (OUTPATIENT)
Dept: INFUSION THERAPY | Facility: HOSPITAL | Age: 78
End: 2018-06-12

## 2018-06-12 ENCOUNTER — RESULT REVIEW (OUTPATIENT)
Age: 78
End: 2018-06-12

## 2018-06-12 ENCOUNTER — APPOINTMENT (OUTPATIENT)
Dept: HEMATOLOGY ONCOLOGY | Facility: CLINIC | Age: 78
End: 2018-06-12
Payer: MEDICARE

## 2018-06-12 VITALS
WEIGHT: 121.98 LBS | OXYGEN SATURATION: 96 % | HEART RATE: 93 BPM | BODY MASS INDEX: 22.31 KG/M2 | TEMPERATURE: 98.7 F | RESPIRATION RATE: 16 BRPM | SYSTOLIC BLOOD PRESSURE: 124 MMHG | DIASTOLIC BLOOD PRESSURE: 68 MMHG

## 2018-06-12 LAB
BASOPHILS # BLD AUTO: 0.1 K/UL — SIGNIFICANT CHANGE UP (ref 0–0.2)
BASOPHILS NFR BLD AUTO: 0.9 % — SIGNIFICANT CHANGE UP (ref 0–2)
EOSINOPHIL # BLD AUTO: 0.2 K/UL — SIGNIFICANT CHANGE UP (ref 0–0.5)
EOSINOPHIL NFR BLD AUTO: 2.4 % — SIGNIFICANT CHANGE UP (ref 0–6)
HCT VFR BLD CALC: 29.2 % — LOW (ref 34.5–45)
HGB BLD-MCNC: 10.1 G/DL — LOW (ref 11.5–15.5)
LYMPHOCYTES # BLD AUTO: 1.2 K/UL — SIGNIFICANT CHANGE UP (ref 1–3.3)
LYMPHOCYTES # BLD AUTO: 18.8 % — SIGNIFICANT CHANGE UP (ref 13–44)
MCHC RBC-ENTMCNC: 30.1 PG — SIGNIFICANT CHANGE UP (ref 27–34)
MCHC RBC-ENTMCNC: 34.5 G/DL — SIGNIFICANT CHANGE UP (ref 32–36)
MCV RBC AUTO: 87.2 FL — SIGNIFICANT CHANGE UP (ref 80–100)
MONOCYTES # BLD AUTO: 0.5 K/UL — SIGNIFICANT CHANGE UP (ref 0–0.9)
MONOCYTES NFR BLD AUTO: 8.1 % — SIGNIFICANT CHANGE UP (ref 2–14)
NEUTROPHILS # BLD AUTO: 4.5 K/UL — SIGNIFICANT CHANGE UP (ref 1.8–7.4)
NEUTROPHILS NFR BLD AUTO: 69.9 % — SIGNIFICANT CHANGE UP (ref 43–77)
PLATELET # BLD AUTO: 222 K/UL — SIGNIFICANT CHANGE UP (ref 150–400)
RBC # BLD: 3.35 M/UL — LOW (ref 3.8–5.2)
RBC # FLD: 13.2 % — SIGNIFICANT CHANGE UP (ref 10.3–14.5)
WBC # BLD: 6.5 K/UL — SIGNIFICANT CHANGE UP (ref 3.8–10.5)
WBC # FLD AUTO: 6.5 K/UL — SIGNIFICANT CHANGE UP (ref 3.8–10.5)

## 2018-06-12 PROCEDURE — 99215 OFFICE O/P EST HI 40 MIN: CPT

## 2018-06-14 ENCOUNTER — RESULT REVIEW (OUTPATIENT)
Age: 78
End: 2018-06-14

## 2018-06-21 ENCOUNTER — OUTPATIENT (OUTPATIENT)
Dept: OUTPATIENT SERVICES | Facility: HOSPITAL | Age: 78
LOS: 1 days | Discharge: HOME | End: 2018-06-21

## 2018-06-21 DIAGNOSIS — Z98.890 OTHER SPECIFIED POSTPROCEDURAL STATES: Chronic | ICD-10-CM

## 2018-06-21 DIAGNOSIS — C34.12 MALIGNANT NEOPLASM OF UPPER LOBE, LEFT BRONCHUS OR LUNG: ICD-10-CM

## 2018-06-26 ENCOUNTER — APPOINTMENT (OUTPATIENT)
Dept: INFUSION THERAPY | Facility: HOSPITAL | Age: 78
End: 2018-06-26

## 2018-06-27 DIAGNOSIS — Z51.11 ENCOUNTER FOR ANTINEOPLASTIC CHEMOTHERAPY: ICD-10-CM

## 2018-07-10 ENCOUNTER — RESULT REVIEW (OUTPATIENT)
Age: 78
End: 2018-07-10

## 2018-07-10 ENCOUNTER — APPOINTMENT (OUTPATIENT)
Dept: INFUSION THERAPY | Facility: HOSPITAL | Age: 78
End: 2018-07-10

## 2018-07-10 ENCOUNTER — LABORATORY RESULT (OUTPATIENT)
Age: 78
End: 2018-07-10

## 2018-07-10 LAB
BASOPHILS # BLD AUTO: 0 K/UL — SIGNIFICANT CHANGE UP (ref 0–0.2)
BASOPHILS NFR BLD AUTO: 0.3 % — SIGNIFICANT CHANGE UP (ref 0–2)
EOSINOPHIL # BLD AUTO: 0.1 K/UL — SIGNIFICANT CHANGE UP (ref 0–0.5)
EOSINOPHIL NFR BLD AUTO: 0.9 % — SIGNIFICANT CHANGE UP (ref 0–6)
HCT VFR BLD CALC: 31.6 % — LOW (ref 34.5–45)
HGB BLD-MCNC: 10.7 G/DL — LOW (ref 11.5–15.5)
LYMPHOCYTES # BLD AUTO: 1 K/UL — SIGNIFICANT CHANGE UP (ref 1–3.3)
LYMPHOCYTES # BLD AUTO: 17.5 % — SIGNIFICANT CHANGE UP (ref 13–44)
MCHC RBC-ENTMCNC: 30 PG — SIGNIFICANT CHANGE UP (ref 27–34)
MCHC RBC-ENTMCNC: 33.7 G/DL — SIGNIFICANT CHANGE UP (ref 32–36)
MCV RBC AUTO: 89 FL — SIGNIFICANT CHANGE UP (ref 80–100)
MONOCYTES # BLD AUTO: 0.5 K/UL — SIGNIFICANT CHANGE UP (ref 0–0.9)
MONOCYTES NFR BLD AUTO: 9.2 % — SIGNIFICANT CHANGE UP (ref 2–14)
NEUTROPHILS # BLD AUTO: 4.1 K/UL — SIGNIFICANT CHANGE UP (ref 1.8–7.4)
NEUTROPHILS NFR BLD AUTO: 72.2 % — SIGNIFICANT CHANGE UP (ref 43–77)
PLATELET # BLD AUTO: 179 K/UL — SIGNIFICANT CHANGE UP (ref 150–400)
RBC # BLD: 3.55 M/UL — LOW (ref 3.8–5.2)
RBC # FLD: 13.6 % — SIGNIFICANT CHANGE UP (ref 10.3–14.5)
WBC # BLD: 5.7 K/UL — SIGNIFICANT CHANGE UP (ref 3.8–10.5)
WBC # FLD AUTO: 5.7 K/UL — SIGNIFICANT CHANGE UP (ref 3.8–10.5)

## 2018-07-17 ENCOUNTER — OUTPATIENT (OUTPATIENT)
Dept: OUTPATIENT SERVICES | Facility: HOSPITAL | Age: 78
LOS: 1 days | Discharge: HOME | End: 2018-07-17

## 2018-07-17 DIAGNOSIS — C34.12 MALIGNANT NEOPLASM OF UPPER LOBE, LEFT BRONCHUS OR LUNG: ICD-10-CM

## 2018-07-17 DIAGNOSIS — Z98.890 OTHER SPECIFIED POSTPROCEDURAL STATES: Chronic | ICD-10-CM

## 2018-07-17 PROBLEM — I10 ESSENTIAL (PRIMARY) HYPERTENSION: Chronic | Status: ACTIVE | Noted: 2017-09-05

## 2018-07-17 PROBLEM — R91.8 OTHER NONSPECIFIC ABNORMAL FINDING OF LUNG FIELD: Chronic | Status: ACTIVE | Noted: 2017-09-05

## 2018-07-17 PROBLEM — K21.9 GASTRO-ESOPHAGEAL REFLUX DISEASE WITHOUT ESOPHAGITIS: Chronic | Status: ACTIVE | Noted: 2017-09-05

## 2018-07-17 PROBLEM — E03.9 HYPOTHYROIDISM, UNSPECIFIED: Chronic | Status: ACTIVE | Noted: 2017-09-05

## 2018-07-19 ENCOUNTER — FORM ENCOUNTER (OUTPATIENT)
Age: 78
End: 2018-07-19

## 2018-07-20 ENCOUNTER — APPOINTMENT (OUTPATIENT)
Dept: CT IMAGING | Facility: IMAGING CENTER | Age: 78
End: 2018-07-20

## 2018-07-20 ENCOUNTER — OUTPATIENT (OUTPATIENT)
Dept: OUTPATIENT SERVICES | Facility: HOSPITAL | Age: 78
LOS: 1 days | End: 2018-07-20
Payer: MEDICARE

## 2018-07-20 DIAGNOSIS — Z98.890 OTHER SPECIFIED POSTPROCEDURAL STATES: Chronic | ICD-10-CM

## 2018-07-20 DIAGNOSIS — C34.12 MALIGNANT NEOPLASM OF UPPER LOBE, LEFT BRONCHUS OR LUNG: ICD-10-CM

## 2018-07-20 PROCEDURE — 71250 CT THORAX DX C-: CPT

## 2018-07-20 PROCEDURE — 71250 CT THORAX DX C-: CPT | Mod: 26

## 2018-07-22 PROBLEM — Z80.0 FAMILY HISTORY OF COLON CANCER: Status: ACTIVE | Noted: 2017-09-01

## 2018-07-24 ENCOUNTER — APPOINTMENT (OUTPATIENT)
Dept: HEMATOLOGY ONCOLOGY | Facility: CLINIC | Age: 78
End: 2018-07-24
Payer: MEDICARE

## 2018-07-24 ENCOUNTER — APPOINTMENT (OUTPATIENT)
Dept: INFUSION THERAPY | Facility: HOSPITAL | Age: 78
End: 2018-07-24

## 2018-07-24 VITALS
HEART RATE: 109 BPM | RESPIRATION RATE: 16 BRPM | DIASTOLIC BLOOD PRESSURE: 70 MMHG | SYSTOLIC BLOOD PRESSURE: 130 MMHG | TEMPERATURE: 98 F | WEIGHT: 124.56 LBS | OXYGEN SATURATION: 96 % | BODY MASS INDEX: 22.78 KG/M2

## 2018-07-24 PROCEDURE — 99215 OFFICE O/P EST HI 40 MIN: CPT

## 2018-07-25 DIAGNOSIS — Z51.11 ENCOUNTER FOR ANTINEOPLASTIC CHEMOTHERAPY: ICD-10-CM

## 2018-08-07 ENCOUNTER — LABORATORY RESULT (OUTPATIENT)
Age: 78
End: 2018-08-07

## 2018-08-07 ENCOUNTER — APPOINTMENT (OUTPATIENT)
Dept: INFUSION THERAPY | Facility: HOSPITAL | Age: 78
End: 2018-08-07

## 2018-08-07 ENCOUNTER — RESULT REVIEW (OUTPATIENT)
Age: 78
End: 2018-08-07

## 2018-08-07 LAB
BASOPHILS # BLD AUTO: 0 K/UL — SIGNIFICANT CHANGE UP (ref 0–0.2)
BASOPHILS NFR BLD AUTO: 0.5 % — SIGNIFICANT CHANGE UP (ref 0–2)
EOSINOPHIL # BLD AUTO: 0.1 K/UL — SIGNIFICANT CHANGE UP (ref 0–0.5)
EOSINOPHIL NFR BLD AUTO: 1.4 % — SIGNIFICANT CHANGE UP (ref 0–6)
HCT VFR BLD CALC: 32.6 % — LOW (ref 34.5–45)
HGB BLD-MCNC: 10.9 G/DL — LOW (ref 11.5–15.5)
LYMPHOCYTES # BLD AUTO: 1.4 K/UL — SIGNIFICANT CHANGE UP (ref 1–3.3)
LYMPHOCYTES # BLD AUTO: 24.8 % — SIGNIFICANT CHANGE UP (ref 13–44)
MCHC RBC-ENTMCNC: 30.3 PG — SIGNIFICANT CHANGE UP (ref 27–34)
MCHC RBC-ENTMCNC: 33.6 G/DL — SIGNIFICANT CHANGE UP (ref 32–36)
MCV RBC AUTO: 90 FL — SIGNIFICANT CHANGE UP (ref 80–100)
MONOCYTES # BLD AUTO: 0.6 K/UL — SIGNIFICANT CHANGE UP (ref 0–0.9)
MONOCYTES NFR BLD AUTO: 9.7 % — SIGNIFICANT CHANGE UP (ref 2–14)
NEUTROPHILS # BLD AUTO: 3.7 K/UL — SIGNIFICANT CHANGE UP (ref 1.8–7.4)
NEUTROPHILS NFR BLD AUTO: 63.6 % — SIGNIFICANT CHANGE UP (ref 43–77)
PLATELET # BLD AUTO: 204 K/UL — SIGNIFICANT CHANGE UP (ref 150–400)
RBC # BLD: 3.62 M/UL — LOW (ref 3.8–5.2)
RBC # FLD: 13 % — SIGNIFICANT CHANGE UP (ref 10.3–14.5)
WBC # BLD: 5.8 K/UL — SIGNIFICANT CHANGE UP (ref 3.8–10.5)
WBC # FLD AUTO: 5.8 K/UL — SIGNIFICANT CHANGE UP (ref 3.8–10.5)

## 2018-08-09 ENCOUNTER — APPOINTMENT (OUTPATIENT)
Dept: OTOLARYNGOLOGY | Facility: CLINIC | Age: 78
End: 2018-08-09

## 2018-08-09 ENCOUNTER — OUTPATIENT (OUTPATIENT)
Dept: OUTPATIENT SERVICES | Facility: HOSPITAL | Age: 78
LOS: 1 days | Discharge: HOME | End: 2018-08-09

## 2018-08-09 DIAGNOSIS — C34.12 MALIGNANT NEOPLASM OF UPPER LOBE, LEFT BRONCHUS OR LUNG: ICD-10-CM

## 2018-08-09 DIAGNOSIS — Z98.890 OTHER SPECIFIED POSTPROCEDURAL STATES: Chronic | ICD-10-CM

## 2018-08-21 ENCOUNTER — APPOINTMENT (OUTPATIENT)
Dept: HEMATOLOGY ONCOLOGY | Facility: CLINIC | Age: 78
End: 2018-08-21
Payer: MEDICARE

## 2018-08-21 ENCOUNTER — LABORATORY RESULT (OUTPATIENT)
Age: 78
End: 2018-08-21

## 2018-08-21 ENCOUNTER — APPOINTMENT (OUTPATIENT)
Dept: INFUSION THERAPY | Facility: HOSPITAL | Age: 78
End: 2018-08-21

## 2018-08-21 VITALS
OXYGEN SATURATION: 96 % | HEART RATE: 97 BPM | BODY MASS INDEX: 22.78 KG/M2 | RESPIRATION RATE: 16 BRPM | TEMPERATURE: 98.7 F | SYSTOLIC BLOOD PRESSURE: 110 MMHG | WEIGHT: 124.56 LBS | DIASTOLIC BLOOD PRESSURE: 70 MMHG

## 2018-08-21 PROCEDURE — 99214 OFFICE O/P EST MOD 30 MIN: CPT

## 2018-08-22 DIAGNOSIS — Z51.11 ENCOUNTER FOR ANTINEOPLASTIC CHEMOTHERAPY: ICD-10-CM

## 2018-09-04 ENCOUNTER — APPOINTMENT (OUTPATIENT)
Dept: INFUSION THERAPY | Facility: HOSPITAL | Age: 78
End: 2018-09-04

## 2018-09-04 ENCOUNTER — RESULT REVIEW (OUTPATIENT)
Age: 78
End: 2018-09-04

## 2018-09-04 ENCOUNTER — LABORATORY RESULT (OUTPATIENT)
Age: 78
End: 2018-09-04

## 2018-09-04 LAB
BASOPHILS # BLD AUTO: 0 K/UL — SIGNIFICANT CHANGE UP (ref 0–0.2)
BASOPHILS NFR BLD AUTO: 0.7 % — SIGNIFICANT CHANGE UP (ref 0–2)
EOSINOPHIL # BLD AUTO: 0.1 K/UL — SIGNIFICANT CHANGE UP (ref 0–0.5)
EOSINOPHIL NFR BLD AUTO: 1.1 % — SIGNIFICANT CHANGE UP (ref 0–6)
HCT VFR BLD CALC: 33 % — LOW (ref 34.5–45)
HGB BLD-MCNC: 11 G/DL — LOW (ref 11.5–15.5)
LYMPHOCYTES # BLD AUTO: 1.2 K/UL — SIGNIFICANT CHANGE UP (ref 1–3.3)
LYMPHOCYTES # BLD AUTO: 19 % — SIGNIFICANT CHANGE UP (ref 13–44)
MCHC RBC-ENTMCNC: 29.8 PG — SIGNIFICANT CHANGE UP (ref 27–34)
MCHC RBC-ENTMCNC: 33.4 G/DL — SIGNIFICANT CHANGE UP (ref 32–36)
MCV RBC AUTO: 89.3 FL — SIGNIFICANT CHANGE UP (ref 80–100)
MONOCYTES # BLD AUTO: 0.5 K/UL — SIGNIFICANT CHANGE UP (ref 0–0.9)
MONOCYTES NFR BLD AUTO: 8.8 % — SIGNIFICANT CHANGE UP (ref 2–14)
NEUTROPHILS # BLD AUTO: 4.3 K/UL — SIGNIFICANT CHANGE UP (ref 1.8–7.4)
NEUTROPHILS NFR BLD AUTO: 70.3 % — SIGNIFICANT CHANGE UP (ref 43–77)
PLATELET # BLD AUTO: 185 K/UL — SIGNIFICANT CHANGE UP (ref 150–400)
RBC # BLD: 3.69 M/UL — LOW (ref 3.8–5.2)
RBC # FLD: 12.3 % — SIGNIFICANT CHANGE UP (ref 10.3–14.5)
WBC # BLD: 6.1 K/UL — SIGNIFICANT CHANGE UP (ref 3.8–10.5)
WBC # FLD AUTO: 6.1 K/UL — SIGNIFICANT CHANGE UP (ref 3.8–10.5)

## 2018-09-11 ENCOUNTER — OUTPATIENT (OUTPATIENT)
Dept: OUTPATIENT SERVICES | Facility: HOSPITAL | Age: 78
LOS: 1 days | Discharge: HOME | End: 2018-09-11

## 2018-09-11 DIAGNOSIS — C34.12 MALIGNANT NEOPLASM OF UPPER LOBE, LEFT BRONCHUS OR LUNG: ICD-10-CM

## 2018-09-11 DIAGNOSIS — Z98.890 OTHER SPECIFIED POSTPROCEDURAL STATES: Chronic | ICD-10-CM

## 2018-09-18 ENCOUNTER — APPOINTMENT (OUTPATIENT)
Dept: INFUSION THERAPY | Facility: HOSPITAL | Age: 78
End: 2018-09-18

## 2018-09-19 DIAGNOSIS — Z51.11 ENCOUNTER FOR ANTINEOPLASTIC CHEMOTHERAPY: ICD-10-CM

## 2018-10-02 ENCOUNTER — RESULT REVIEW (OUTPATIENT)
Age: 78
End: 2018-10-02

## 2018-10-02 ENCOUNTER — APPOINTMENT (OUTPATIENT)
Dept: HEMATOLOGY ONCOLOGY | Facility: CLINIC | Age: 78
End: 2018-10-02
Payer: MEDICARE

## 2018-10-02 ENCOUNTER — APPOINTMENT (OUTPATIENT)
Dept: INFUSION THERAPY | Facility: HOSPITAL | Age: 78
End: 2018-10-02

## 2018-10-02 ENCOUNTER — LABORATORY RESULT (OUTPATIENT)
Age: 78
End: 2018-10-02

## 2018-10-02 VITALS
WEIGHT: 125.66 LBS | RESPIRATION RATE: 16 BRPM | DIASTOLIC BLOOD PRESSURE: 71 MMHG | TEMPERATURE: 98.2 F | OXYGEN SATURATION: 97 % | BODY MASS INDEX: 22.98 KG/M2 | SYSTOLIC BLOOD PRESSURE: 129 MMHG | HEART RATE: 82 BPM

## 2018-10-02 LAB
BASOPHILS # BLD AUTO: 0 K/UL — SIGNIFICANT CHANGE UP (ref 0–0.2)
BASOPHILS NFR BLD AUTO: 0.8 % — SIGNIFICANT CHANGE UP (ref 0–2)
EOSINOPHIL # BLD AUTO: 0 K/UL — SIGNIFICANT CHANGE UP (ref 0–0.5)
EOSINOPHIL NFR BLD AUTO: 0.5 % — SIGNIFICANT CHANGE UP (ref 0–6)
HCT VFR BLD CALC: 33.4 % — LOW (ref 34.5–45)
HGB BLD-MCNC: 11.3 G/DL — LOW (ref 11.5–15.5)
LYMPHOCYTES # BLD AUTO: 1.2 K/UL — SIGNIFICANT CHANGE UP (ref 1–3.3)
LYMPHOCYTES # BLD AUTO: 21.4 % — SIGNIFICANT CHANGE UP (ref 13–44)
MCHC RBC-ENTMCNC: 30 PG — SIGNIFICANT CHANGE UP (ref 27–34)
MCHC RBC-ENTMCNC: 33.9 G/DL — SIGNIFICANT CHANGE UP (ref 32–36)
MCV RBC AUTO: 88.4 FL — SIGNIFICANT CHANGE UP (ref 80–100)
MONOCYTES # BLD AUTO: 0.5 K/UL — SIGNIFICANT CHANGE UP (ref 0–0.9)
MONOCYTES NFR BLD AUTO: 8.5 % — SIGNIFICANT CHANGE UP (ref 2–14)
NEUTROPHILS # BLD AUTO: 3.8 K/UL — SIGNIFICANT CHANGE UP (ref 1.8–7.4)
NEUTROPHILS NFR BLD AUTO: 68.9 % — SIGNIFICANT CHANGE UP (ref 43–77)
PLATELET # BLD AUTO: 179 K/UL — SIGNIFICANT CHANGE UP (ref 150–400)
RBC # BLD: 3.78 M/UL — LOW (ref 3.8–5.2)
RBC # FLD: 12 % — SIGNIFICANT CHANGE UP (ref 10.3–14.5)
WBC # BLD: 5.4 K/UL — SIGNIFICANT CHANGE UP (ref 3.8–10.5)
WBC # FLD AUTO: 5.4 K/UL — SIGNIFICANT CHANGE UP (ref 3.8–10.5)

## 2018-10-02 PROCEDURE — 99214 OFFICE O/P EST MOD 30 MIN: CPT

## 2018-10-08 ENCOUNTER — OUTPATIENT (OUTPATIENT)
Dept: OUTPATIENT SERVICES | Facility: HOSPITAL | Age: 78
LOS: 1 days | Discharge: HOME | End: 2018-10-08

## 2018-10-08 DIAGNOSIS — Z98.890 OTHER SPECIFIED POSTPROCEDURAL STATES: Chronic | ICD-10-CM

## 2018-10-08 DIAGNOSIS — C34.12 MALIGNANT NEOPLASM OF UPPER LOBE, LEFT BRONCHUS OR LUNG: ICD-10-CM

## 2018-10-16 ENCOUNTER — APPOINTMENT (OUTPATIENT)
Dept: INFUSION THERAPY | Facility: HOSPITAL | Age: 78
End: 2018-10-16

## 2018-10-17 DIAGNOSIS — Z51.11 ENCOUNTER FOR ANTINEOPLASTIC CHEMOTHERAPY: ICD-10-CM

## 2018-10-25 ENCOUNTER — FORM ENCOUNTER (OUTPATIENT)
Age: 78
End: 2018-10-25

## 2018-10-26 ENCOUNTER — OUTPATIENT (OUTPATIENT)
Dept: OUTPATIENT SERVICES | Facility: HOSPITAL | Age: 78
LOS: 1 days | End: 2018-10-26
Payer: MEDICARE

## 2018-10-26 ENCOUNTER — APPOINTMENT (OUTPATIENT)
Dept: CT IMAGING | Facility: IMAGING CENTER | Age: 78
End: 2018-10-26
Payer: MEDICARE

## 2018-10-26 DIAGNOSIS — Z98.890 OTHER SPECIFIED POSTPROCEDURAL STATES: Chronic | ICD-10-CM

## 2018-10-26 DIAGNOSIS — C34.12 MALIGNANT NEOPLASM OF UPPER LOBE, LEFT BRONCHUS OR LUNG: ICD-10-CM

## 2018-10-26 PROCEDURE — 71250 CT THORAX DX C-: CPT

## 2018-10-26 PROCEDURE — 71250 CT THORAX DX C-: CPT | Mod: 26

## 2018-10-30 ENCOUNTER — APPOINTMENT (OUTPATIENT)
Dept: INFUSION THERAPY | Facility: HOSPITAL | Age: 78
End: 2018-10-30

## 2018-10-30 ENCOUNTER — LABORATORY RESULT (OUTPATIENT)
Age: 78
End: 2018-10-30

## 2018-10-30 ENCOUNTER — APPOINTMENT (OUTPATIENT)
Dept: HEMATOLOGY ONCOLOGY | Facility: CLINIC | Age: 78
End: 2018-10-30
Payer: MEDICARE

## 2018-10-30 ENCOUNTER — RESULT REVIEW (OUTPATIENT)
Age: 78
End: 2018-10-30

## 2018-10-30 VITALS
SYSTOLIC BLOOD PRESSURE: 120 MMHG | DIASTOLIC BLOOD PRESSURE: 70 MMHG | WEIGHT: 124.56 LBS | OXYGEN SATURATION: 98 % | TEMPERATURE: 98.2 F | HEART RATE: 96 BPM | BODY MASS INDEX: 22.78 KG/M2 | RESPIRATION RATE: 16 BRPM

## 2018-10-30 LAB
BASOPHILS # BLD AUTO: 0 K/UL — SIGNIFICANT CHANGE UP (ref 0–0.2)
BASOPHILS NFR BLD AUTO: 0.5 % — SIGNIFICANT CHANGE UP (ref 0–2)
EOSINOPHIL # BLD AUTO: 0 K/UL — SIGNIFICANT CHANGE UP (ref 0–0.5)
EOSINOPHIL NFR BLD AUTO: 0.1 % — SIGNIFICANT CHANGE UP (ref 0–6)
HCT VFR BLD CALC: 33.1 % — LOW (ref 34.5–45)
HGB BLD-MCNC: 11.5 G/DL — SIGNIFICANT CHANGE UP (ref 11.5–15.5)
LYMPHOCYTES # BLD AUTO: 1.2 K/UL — SIGNIFICANT CHANGE UP (ref 1–3.3)
LYMPHOCYTES # BLD AUTO: 18.9 % — SIGNIFICANT CHANGE UP (ref 13–44)
MCHC RBC-ENTMCNC: 30.6 PG — SIGNIFICANT CHANGE UP (ref 27–34)
MCHC RBC-ENTMCNC: 34.6 G/DL — SIGNIFICANT CHANGE UP (ref 32–36)
MCV RBC AUTO: 88.3 FL — SIGNIFICANT CHANGE UP (ref 80–100)
MONOCYTES # BLD AUTO: 0.5 K/UL — SIGNIFICANT CHANGE UP (ref 0–0.9)
MONOCYTES NFR BLD AUTO: 8.3 % — SIGNIFICANT CHANGE UP (ref 2–14)
NEUTROPHILS # BLD AUTO: 4.7 K/UL — SIGNIFICANT CHANGE UP (ref 1.8–7.4)
NEUTROPHILS NFR BLD AUTO: 72.3 % — SIGNIFICANT CHANGE UP (ref 43–77)
PLATELET # BLD AUTO: 183 K/UL — SIGNIFICANT CHANGE UP (ref 150–400)
RBC # BLD: 3.75 M/UL — LOW (ref 3.8–5.2)
RBC # FLD: 12.2 % — SIGNIFICANT CHANGE UP (ref 10.3–14.5)
WBC # BLD: 6.5 K/UL — SIGNIFICANT CHANGE UP (ref 3.8–10.5)
WBC # FLD AUTO: 6.5 K/UL — SIGNIFICANT CHANGE UP (ref 3.8–10.5)

## 2018-10-30 PROCEDURE — 99214 OFFICE O/P EST MOD 30 MIN: CPT

## 2018-11-05 ENCOUNTER — OUTPATIENT (OUTPATIENT)
Dept: OUTPATIENT SERVICES | Facility: HOSPITAL | Age: 78
LOS: 1 days | Discharge: HOME | End: 2018-11-05

## 2018-11-05 DIAGNOSIS — Z98.890 OTHER SPECIFIED POSTPROCEDURAL STATES: Chronic | ICD-10-CM

## 2018-11-05 DIAGNOSIS — C34.12 MALIGNANT NEOPLASM OF UPPER LOBE, LEFT BRONCHUS OR LUNG: ICD-10-CM

## 2018-11-08 ENCOUNTER — APPOINTMENT (OUTPATIENT)
Dept: RADIATION ONCOLOGY | Facility: CLINIC | Age: 78
End: 2018-11-08
Payer: MEDICARE

## 2018-11-08 VITALS
OXYGEN SATURATION: 98 % | BODY MASS INDEX: 23.08 KG/M2 | HEART RATE: 85 BPM | WEIGHT: 125.44 LBS | HEIGHT: 62 IN | SYSTOLIC BLOOD PRESSURE: 97 MMHG | DIASTOLIC BLOOD PRESSURE: 58 MMHG | RESPIRATION RATE: 16 BRPM | TEMPERATURE: 97.9 F

## 2018-11-08 PROCEDURE — 99213 OFFICE O/P EST LOW 20 MIN: CPT | Mod: GC

## 2018-11-14 ENCOUNTER — RESULT REVIEW (OUTPATIENT)
Age: 78
End: 2018-11-14

## 2018-11-14 ENCOUNTER — LABORATORY RESULT (OUTPATIENT)
Age: 78
End: 2018-11-14

## 2018-11-14 ENCOUNTER — APPOINTMENT (OUTPATIENT)
Dept: INFUSION THERAPY | Facility: HOSPITAL | Age: 78
End: 2018-11-14

## 2018-11-14 LAB
BASOPHILS # BLD AUTO: 0 K/UL — SIGNIFICANT CHANGE UP (ref 0–0.2)
BASOPHILS NFR BLD AUTO: 0.4 % — SIGNIFICANT CHANGE UP (ref 0–2)
EOSINOPHIL # BLD AUTO: 0 K/UL — SIGNIFICANT CHANGE UP (ref 0–0.5)
EOSINOPHIL NFR BLD AUTO: 0.1 % — SIGNIFICANT CHANGE UP (ref 0–6)
HCT VFR BLD CALC: 31 % — LOW (ref 34.5–45)
HGB BLD-MCNC: 10.4 G/DL — LOW (ref 11.5–15.5)
LYMPHOCYTES # BLD AUTO: 1.3 K/UL — SIGNIFICANT CHANGE UP (ref 1–3.3)
LYMPHOCYTES # BLD AUTO: 21.5 % — SIGNIFICANT CHANGE UP (ref 13–44)
MCHC RBC-ENTMCNC: 29.7 PG — SIGNIFICANT CHANGE UP (ref 27–34)
MCHC RBC-ENTMCNC: 33.6 G/DL — SIGNIFICANT CHANGE UP (ref 32–36)
MCV RBC AUTO: 88.7 FL — SIGNIFICANT CHANGE UP (ref 80–100)
MONOCYTES # BLD AUTO: 0.5 K/UL — SIGNIFICANT CHANGE UP (ref 0–0.9)
MONOCYTES NFR BLD AUTO: 8.7 % — SIGNIFICANT CHANGE UP (ref 2–14)
NEUTROPHILS # BLD AUTO: 4.1 K/UL — SIGNIFICANT CHANGE UP (ref 1.8–7.4)
NEUTROPHILS NFR BLD AUTO: 69.3 % — SIGNIFICANT CHANGE UP (ref 43–77)
PLATELET # BLD AUTO: 173 K/UL — SIGNIFICANT CHANGE UP (ref 150–400)
RBC # BLD: 3.5 M/UL — LOW (ref 3.8–5.2)
RBC # FLD: 12.7 % — SIGNIFICANT CHANGE UP (ref 10.3–14.5)
WBC # BLD: 6 K/UL — SIGNIFICANT CHANGE UP (ref 3.8–10.5)
WBC # FLD AUTO: 6 K/UL — SIGNIFICANT CHANGE UP (ref 3.8–10.5)

## 2018-11-15 DIAGNOSIS — Z51.11 ENCOUNTER FOR ANTINEOPLASTIC CHEMOTHERAPY: ICD-10-CM

## 2018-11-16 NOTE — DISEASE MANAGEMENT
[Clinical] : TNM Stage: c [IIIB] : IIIB [FreeTextEntry4] : SCC of the TA lung  [TTNM] : 4 [NTNM] : 2 [MTNM] : 0

## 2018-11-16 NOTE — HISTORY OF PRESENT ILLNESS
[FreeTextEntry1] : Mrs. 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  She now returns for routine f/u.\par \par Since last visit she has continued on maintenance Durvalumab (started in 02/2018, plan to continue for 1 year).  CT chest 10/26/18 showed that the LLL opacities have nearly resolved. Stable radiation changes. She reports fatigue, but still doing most activities.  She has stable mild SOB/cough.  Denies fevers.  Denies pain.  Appetite less but maintaining weight and denies dysphagia. She will follow up with ENT for vocal cord paralysis.

## 2018-11-16 NOTE — PHYSICAL EXAM
[Sclera] : the sclera and conjunctiva were normal [] : no respiratory distress [Respiration, Rhythm And Depth] : normal respiratory rhythm and effort [Exaggerated Use Of Accessory Muscles For Inspiration] : no accessory muscle use [Normal] : no focal deficits [de-identified] : mild bilateral inspiratory wheeze, otherwise CTAB.

## 2018-11-16 NOTE — REVIEW OF SYSTEMS
[Hypoxia: Grade 0] : Hypoxia: Grade 0 [Pneumonitis: Grade 0] : Pneumonitis: Grade 0 [Negative] : Neurological [Fatigue: Grade 0] : Fatigue: Grade 0 [Cough: Grade 0] : Cough: Grade 0 [Hoarseness: Grade 0] : Hoarseness: Grade 0 [SOB on Exertion] : shortness of breath during exertion [Dyspnea: Grade 1 - Shortness of breath with moderate exertion] : Dyspnea: Grade 1 - Shortness of breath with moderate exertion [FreeTextEntry4] : hoarseness from vocal cord paralysis

## 2018-11-16 NOTE — REASON FOR VISIT
[Routine Follow-Up] : routine follow-up visit for [Lung Cancer] : lung cancer [FreeTextEntry2] : Javi Mcgregor, daughter

## 2018-11-27 ENCOUNTER — APPOINTMENT (OUTPATIENT)
Dept: INFUSION THERAPY | Facility: HOSPITAL | Age: 78
End: 2018-11-27

## 2018-11-29 ENCOUNTER — APPOINTMENT (OUTPATIENT)
Dept: OTOLARYNGOLOGY | Facility: CLINIC | Age: 78
End: 2018-11-29
Payer: MEDICARE

## 2018-11-29 VITALS
WEIGHT: 127 LBS | HEART RATE: 88 BPM | DIASTOLIC BLOOD PRESSURE: 70 MMHG | SYSTOLIC BLOOD PRESSURE: 116 MMHG | HEIGHT: 62 IN | BODY MASS INDEX: 23.37 KG/M2

## 2018-11-29 PROCEDURE — 99214 OFFICE O/P EST MOD 30 MIN: CPT | Mod: 25

## 2018-11-29 PROCEDURE — 31579 LARYNGOSCOPY TELESCOPIC: CPT

## 2018-12-05 ENCOUNTER — OUTPATIENT (OUTPATIENT)
Dept: OUTPATIENT SERVICES | Facility: HOSPITAL | Age: 78
LOS: 1 days | Discharge: HOME | End: 2018-12-05

## 2018-12-05 DIAGNOSIS — C34.12 MALIGNANT NEOPLASM OF UPPER LOBE, LEFT BRONCHUS OR LUNG: ICD-10-CM

## 2018-12-05 DIAGNOSIS — Z98.890 OTHER SPECIFIED POSTPROCEDURAL STATES: Chronic | ICD-10-CM

## 2018-12-06 ENCOUNTER — APPOINTMENT (OUTPATIENT)
Dept: SPEECH THERAPY | Facility: CLINIC | Age: 78
End: 2018-12-06

## 2018-12-06 ENCOUNTER — OUTPATIENT (OUTPATIENT)
Dept: OUTPATIENT SERVICES | Facility: HOSPITAL | Age: 78
LOS: 1 days | Discharge: HOME | End: 2018-12-06

## 2018-12-06 DIAGNOSIS — Z98.890 OTHER SPECIFIED POSTPROCEDURAL STATES: Chronic | ICD-10-CM

## 2018-12-11 ENCOUNTER — RESULT REVIEW (OUTPATIENT)
Age: 78
End: 2018-12-11

## 2018-12-11 ENCOUNTER — APPOINTMENT (OUTPATIENT)
Dept: HEMATOLOGY ONCOLOGY | Facility: CLINIC | Age: 78
End: 2018-12-11
Payer: MEDICARE

## 2018-12-11 ENCOUNTER — APPOINTMENT (OUTPATIENT)
Dept: INFUSION THERAPY | Facility: HOSPITAL | Age: 78
End: 2018-12-11

## 2018-12-11 ENCOUNTER — LABORATORY RESULT (OUTPATIENT)
Age: 78
End: 2018-12-11

## 2018-12-11 VITALS
TEMPERATURE: 98 F | SYSTOLIC BLOOD PRESSURE: 109 MMHG | OXYGEN SATURATION: 98 % | RESPIRATION RATE: 16 BRPM | WEIGHT: 125 LBS | BODY MASS INDEX: 22.86 KG/M2 | DIASTOLIC BLOOD PRESSURE: 61 MMHG | HEART RATE: 94 BPM

## 2018-12-11 DIAGNOSIS — R21 RASH AND OTHER NONSPECIFIC SKIN ERUPTION: ICD-10-CM

## 2018-12-11 LAB
BASOPHILS # BLD AUTO: 0 K/UL — SIGNIFICANT CHANGE UP (ref 0–0.2)
BASOPHILS NFR BLD AUTO: 0.7 % — SIGNIFICANT CHANGE UP (ref 0–2)
EOSINOPHIL # BLD AUTO: 0.1 K/UL — SIGNIFICANT CHANGE UP (ref 0–0.5)
EOSINOPHIL NFR BLD AUTO: 2.1 % — SIGNIFICANT CHANGE UP (ref 0–6)
HCT VFR BLD CALC: 29.7 % — LOW (ref 34.5–45)
HGB BLD-MCNC: 10.2 G/DL — LOW (ref 11.5–15.5)
LYMPHOCYTES # BLD AUTO: 1.2 K/UL — SIGNIFICANT CHANGE UP (ref 1–3.3)
LYMPHOCYTES # BLD AUTO: 25.2 % — SIGNIFICANT CHANGE UP (ref 13–44)
MCHC RBC-ENTMCNC: 30.5 PG — SIGNIFICANT CHANGE UP (ref 27–34)
MCHC RBC-ENTMCNC: 34.5 G/DL — SIGNIFICANT CHANGE UP (ref 32–36)
MCV RBC AUTO: 88.2 FL — SIGNIFICANT CHANGE UP (ref 80–100)
MONOCYTES # BLD AUTO: 0.4 K/UL — SIGNIFICANT CHANGE UP (ref 0–0.9)
MONOCYTES NFR BLD AUTO: 8.6 % — SIGNIFICANT CHANGE UP (ref 2–14)
NEUTROPHILS # BLD AUTO: 3 K/UL — SIGNIFICANT CHANGE UP (ref 1.8–7.4)
NEUTROPHILS NFR BLD AUTO: 63.4 % — SIGNIFICANT CHANGE UP (ref 43–77)
PLATELET # BLD AUTO: 183 K/UL — SIGNIFICANT CHANGE UP (ref 150–400)
RBC # BLD: 3.36 M/UL — LOW (ref 3.8–5.2)
RBC # FLD: 12.5 % — SIGNIFICANT CHANGE UP (ref 10.3–14.5)
WBC # BLD: 4.8 K/UL — SIGNIFICANT CHANGE UP (ref 3.8–10.5)
WBC # FLD AUTO: 4.8 K/UL — SIGNIFICANT CHANGE UP (ref 3.8–10.5)

## 2018-12-11 PROCEDURE — 99214 OFFICE O/P EST MOD 30 MIN: CPT

## 2018-12-12 DIAGNOSIS — Z51.11 ENCOUNTER FOR ANTINEOPLASTIC CHEMOTHERAPY: ICD-10-CM

## 2018-12-13 DIAGNOSIS — R13.13 DYSPHAGIA, PHARYNGEAL PHASE: ICD-10-CM

## 2018-12-26 ENCOUNTER — LABORATORY RESULT (OUTPATIENT)
Age: 78
End: 2018-12-26

## 2018-12-26 ENCOUNTER — RESULT REVIEW (OUTPATIENT)
Age: 78
End: 2018-12-26

## 2018-12-26 ENCOUNTER — APPOINTMENT (OUTPATIENT)
Dept: INFUSION THERAPY | Facility: HOSPITAL | Age: 78
End: 2018-12-26

## 2018-12-26 LAB
BASOPHILS # BLD AUTO: 0.1 K/UL — SIGNIFICANT CHANGE UP (ref 0–0.2)
BASOPHILS NFR BLD AUTO: 1.4 % — SIGNIFICANT CHANGE UP (ref 0–2)
EOSINOPHIL # BLD AUTO: 0.1 K/UL — SIGNIFICANT CHANGE UP (ref 0–0.5)
EOSINOPHIL NFR BLD AUTO: 1.3 % — SIGNIFICANT CHANGE UP (ref 0–6)
HCT VFR BLD CALC: 31.2 % — LOW (ref 34.5–45)
HGB BLD-MCNC: 10.4 G/DL — LOW (ref 11.5–15.5)
LYMPHOCYTES # BLD AUTO: 1.1 K/UL — SIGNIFICANT CHANGE UP (ref 1–3.3)
LYMPHOCYTES # BLD AUTO: 23.9 % — SIGNIFICANT CHANGE UP (ref 13–44)
MCHC RBC-ENTMCNC: 29.9 PG — SIGNIFICANT CHANGE UP (ref 27–34)
MCHC RBC-ENTMCNC: 33.3 G/DL — SIGNIFICANT CHANGE UP (ref 32–36)
MCV RBC AUTO: 89.7 FL — SIGNIFICANT CHANGE UP (ref 80–100)
MONOCYTES # BLD AUTO: 0.4 K/UL — SIGNIFICANT CHANGE UP (ref 0–0.9)
MONOCYTES NFR BLD AUTO: 7.8 % — SIGNIFICANT CHANGE UP (ref 2–14)
NEUTROPHILS # BLD AUTO: 3.1 K/UL — SIGNIFICANT CHANGE UP (ref 1.8–7.4)
NEUTROPHILS NFR BLD AUTO: 65.7 % — SIGNIFICANT CHANGE UP (ref 43–77)
PLATELET # BLD AUTO: 192 K/UL — SIGNIFICANT CHANGE UP (ref 150–400)
RBC # BLD: 3.48 M/UL — LOW (ref 3.8–5.2)
RBC # FLD: 12.6 % — SIGNIFICANT CHANGE UP (ref 10.3–14.5)
WBC # BLD: 4.8 K/UL — SIGNIFICANT CHANGE UP (ref 3.8–10.5)
WBC # FLD AUTO: 4.8 K/UL — SIGNIFICANT CHANGE UP (ref 3.8–10.5)

## 2019-01-03 ENCOUNTER — OUTPATIENT (OUTPATIENT)
Dept: OUTPATIENT SERVICES | Facility: HOSPITAL | Age: 79
LOS: 1 days | Discharge: HOME | End: 2019-01-03

## 2019-01-03 DIAGNOSIS — Z98.890 OTHER SPECIFIED POSTPROCEDURAL STATES: Chronic | ICD-10-CM

## 2019-01-03 DIAGNOSIS — C34.12 MALIGNANT NEOPLASM OF UPPER LOBE, LEFT BRONCHUS OR LUNG: ICD-10-CM

## 2019-01-09 ENCOUNTER — APPOINTMENT (OUTPATIENT)
Dept: INFUSION THERAPY | Facility: HOSPITAL | Age: 79
End: 2019-01-09

## 2019-01-10 ENCOUNTER — APPOINTMENT (OUTPATIENT)
Dept: RADIOLOGY | Facility: HOSPITAL | Age: 79
End: 2019-01-10
Payer: MEDICARE

## 2019-01-10 ENCOUNTER — OUTPATIENT (OUTPATIENT)
Dept: OUTPATIENT SERVICES | Facility: HOSPITAL | Age: 79
LOS: 1 days | End: 2019-01-10

## 2019-01-10 ENCOUNTER — APPOINTMENT (OUTPATIENT)
Dept: SPEECH THERAPY | Facility: HOSPITAL | Age: 79
End: 2019-01-10
Payer: MEDICARE

## 2019-01-10 DIAGNOSIS — Z98.890 OTHER SPECIFIED POSTPROCEDURAL STATES: Chronic | ICD-10-CM

## 2019-01-10 DIAGNOSIS — R49.0 DYSPHONIA: ICD-10-CM

## 2019-01-10 DIAGNOSIS — Z51.11 ENCOUNTER FOR ANTINEOPLASTIC CHEMOTHERAPY: ICD-10-CM

## 2019-01-10 PROCEDURE — 74230 X-RAY XM SWLNG FUNCJ C+: CPT | Mod: 26

## 2019-01-18 ENCOUNTER — FORM ENCOUNTER (OUTPATIENT)
Age: 79
End: 2019-01-18

## 2019-01-19 ENCOUNTER — OUTPATIENT (OUTPATIENT)
Dept: OUTPATIENT SERVICES | Facility: HOSPITAL | Age: 79
LOS: 1 days | End: 2019-01-19
Payer: MEDICARE

## 2019-01-19 ENCOUNTER — APPOINTMENT (OUTPATIENT)
Dept: CT IMAGING | Facility: IMAGING CENTER | Age: 79
End: 2019-01-19
Payer: MEDICARE

## 2019-01-19 DIAGNOSIS — Z98.890 OTHER SPECIFIED POSTPROCEDURAL STATES: Chronic | ICD-10-CM

## 2019-01-19 DIAGNOSIS — Z00.8 ENCOUNTER FOR OTHER GENERAL EXAMINATION: ICD-10-CM

## 2019-01-19 PROCEDURE — 71250 CT THORAX DX C-: CPT | Mod: 26

## 2019-01-19 PROCEDURE — 71250 CT THORAX DX C-: CPT

## 2019-01-23 ENCOUNTER — RESULT REVIEW (OUTPATIENT)
Age: 79
End: 2019-01-23

## 2019-01-23 ENCOUNTER — LABORATORY RESULT (OUTPATIENT)
Age: 79
End: 2019-01-23

## 2019-01-23 ENCOUNTER — APPOINTMENT (OUTPATIENT)
Dept: HEMATOLOGY ONCOLOGY | Facility: CLINIC | Age: 79
End: 2019-01-23
Payer: MEDICARE

## 2019-01-23 ENCOUNTER — APPOINTMENT (OUTPATIENT)
Dept: INFUSION THERAPY | Facility: HOSPITAL | Age: 79
End: 2019-01-23

## 2019-01-23 VITALS
HEART RATE: 96 BPM | OXYGEN SATURATION: 99 % | TEMPERATURE: 98.1 F | WEIGHT: 125.66 LBS | DIASTOLIC BLOOD PRESSURE: 70 MMHG | BODY MASS INDEX: 22.98 KG/M2 | RESPIRATION RATE: 16 BRPM | SYSTOLIC BLOOD PRESSURE: 143 MMHG

## 2019-01-23 LAB
BASOPHILS # BLD AUTO: 0.1 K/UL — SIGNIFICANT CHANGE UP (ref 0–0.2)
BASOPHILS NFR BLD AUTO: 1.1 % — SIGNIFICANT CHANGE UP (ref 0–2)
EOSINOPHIL # BLD AUTO: 0.1 K/UL — SIGNIFICANT CHANGE UP (ref 0–0.5)
EOSINOPHIL NFR BLD AUTO: 1 % — SIGNIFICANT CHANGE UP (ref 0–6)
HCT VFR BLD CALC: 33.4 % — LOW (ref 34.5–45)
HGB BLD-MCNC: 11.1 G/DL — LOW (ref 11.5–15.5)
LYMPHOCYTES # BLD AUTO: 1.4 K/UL — SIGNIFICANT CHANGE UP (ref 1–3.3)
LYMPHOCYTES # BLD AUTO: 21.1 % — SIGNIFICANT CHANGE UP (ref 13–44)
MCHC RBC-ENTMCNC: 29.8 PG — SIGNIFICANT CHANGE UP (ref 27–34)
MCHC RBC-ENTMCNC: 33.2 G/DL — SIGNIFICANT CHANGE UP (ref 32–36)
MCV RBC AUTO: 89.7 FL — SIGNIFICANT CHANGE UP (ref 80–100)
MONOCYTES # BLD AUTO: 0.5 K/UL — SIGNIFICANT CHANGE UP (ref 0–0.9)
MONOCYTES NFR BLD AUTO: 7.2 % — SIGNIFICANT CHANGE UP (ref 2–14)
NEUTROPHILS # BLD AUTO: 4.6 K/UL — SIGNIFICANT CHANGE UP (ref 1.8–7.4)
NEUTROPHILS NFR BLD AUTO: 69.6 % — SIGNIFICANT CHANGE UP (ref 43–77)
PLATELET # BLD AUTO: 204 K/UL — SIGNIFICANT CHANGE UP (ref 150–400)
RBC # BLD: 3.73 M/UL — LOW (ref 3.8–5.2)
RBC # FLD: 12.3 % — SIGNIFICANT CHANGE UP (ref 10.3–14.5)
WBC # BLD: 6.6 K/UL — SIGNIFICANT CHANGE UP (ref 3.8–10.5)
WBC # FLD AUTO: 6.6 K/UL — SIGNIFICANT CHANGE UP (ref 3.8–10.5)

## 2019-01-23 PROCEDURE — 99214 OFFICE O/P EST MOD 30 MIN: CPT

## 2019-01-23 NOTE — HISTORY OF PRESENT ILLNESS
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 In the summer of 2016 she experienced a cough that was not resolving after its usual two-month course. She saw her doctor and was sent for a chest x-ray in October which was reportedly negative area and in March/April 2017, she developed hoarseness. She saw her pulmonologist and was prescribed nebulizers. In August 2017 she lost her voice and saw in ENT doctor and underwent a laryngoscopy reveals vocal cord paralysis. She was referred for CT scan of her chest that revealed a large left upper lobe mass with involvement of the recurrent laryngeal nerve causing the vocal cord paralysis. She underwent bronchoscopy with biopsy that was nondiagnostic. Underwent mediastinoscopy revealing squamous cell carcinoma.  Completed concurrent chemo/RT with weekly Carbo/Taxol in mid-December 2017.   Restaging CT Chest in late January 2018 revealed ME.  Started maintenance Durvalumab in late Feb 2018.  [de-identified] : -9/19/17 left paratracheal mass biopsy: Metastatic squamous cell carcinoma.  PDL1 low positive at 5%; Onkosight:  Met splice site mutation.   [de-identified] : Presents today prior to continued treatment with maintenance Durvalumab.  \par Has continued hoarseness and follows with ENT Dr. Bauer.  Earlier this month, underwent a modified barium swallow which was negative for aspiration.  She will be starting speech therapy and may have another injection procedure.  \par Breathing, appetite and weight stable.  Rash on extremities resolved. \par \par Restaging CT Scan with sustained response. \par \par

## 2019-01-23 NOTE — ASSESSMENT
[FreeTextEntry1] : The patient is a never smoker with non-small cell lung cancer with squamous cell carcinoma histology with clinical Stage III disease based on involvement of mediastinal lymph nodes. Completed definitive chemo/RT in mid-December 2017 and achieved [place]. Now on maintenance Durvalumab since late Feb 2018 with sustained response. \par -Continue with durvalumab through end of February.  Will discontinue therapy at that juncture.  –Anemia noted:  monitor.  No need for transfusion.   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

## 2019-01-23 NOTE — RESULTS/DATA
[FreeTextEntry1] : -CT chest 8/30/17: Left upper lobe pulmonary mass measuring 5.6 cm extending to the mediastinum and into the left lower lobe.  No separate enlarged mediastinal or hilar lymph nodes.\par \par -PET/CT 9/6/17: Left upper lobe lung mass measures 6.3 x 4.6 cm which is inseparable from metastatic nodes and left mediastinum and left hilar region. The mass involves the recurrent laryngeal nerve causing left vocal cord paralysis. Several scattered areas of faint infiltrate in the left upper and left lower lobes corresponding to a very small loculated left pleural effusion and rind of subpleural infiltrate in the left lower lobe which is likely inflammatory however malignancy is not excluded.\par \par -MRI brain 9/14/17: Negative for metastatic disease.\par \par -CT Chest 1/29/18\par When compared with the prior outside study dated August 30, 2017: \par -The left-sided AP window mass has considerably decreased in size and now demonstrates cystic change. The cystic change measures approximately 2.6 x 1.3 cm. There is now consolidation within the left upper lobe measuring approximately 1.4 x 2.9 cm likely representing post radiation change. There is no interlobular septal thickening in the left upper lobe suggestive of lymphangitic spread or edema. Attention on follow-up. \par -Left lower lobe peribronchial vascular areas of consolidation likely represents an organizing pneumonia related to prior radiation. \par -Diffuse esophageal thickening related to prior radiation. \par \par -CT Chest 4/23/18:  Left upper and lower lobe architectural distortion and areas of consolidation which are nonspecific but have the appearance of post radiation change. The left upper lobe parenchymal consolidation/architectural distortion is unchanged since the previous exam. Slightly increased consolidation and architectural distortion in the left lower lobe. These have the appearance of post radiation change. This left-sided perihilar cystic change has decreased in size and now measures 2.3 x 0.9 cm. \par \par -CT Chest 7/20/18:  Left mid to upper lung opacities related to radiation therapy changes with slight increasing volume loss as compared with April 23, 2018. \par 3 left lower lobe subcentimeter nodular opacities new since April 23, 2018 are nonspecific. A 1-3 month follow-up CT is recommended for further evaluation. \par \par -CT Chest 10/26/18:  \par 1. The left lower lobe nodular opacities have nearly completely resolved since 7/20/2018. A 3 month follow-up is recommended to evaluate for resolution. \par 2. The left lung radiation related changes are unchanged since 7/20/2018. \par \par Reviewed:\par \par -CT Chest 1/19/19:  \par 1. Status post radiation to the left hemithorax. \par 2. The opacities in the left lower lobe have nearly completely resolved with residual linear opacities remaining.

## 2019-01-23 NOTE — PHYSICAL EXAM
[Restricted in physically strenuous activity but ambulatory and able to carry out work of a light or sedentary nature] : Status 1- Restricted in physically strenuous activity but ambulatory and able to carry out work of a light or sedentary nature, e.g., light house work, office work [Normal] : affect appropriate [de-identified] : No icterus  [de-identified] : MMM O/P clear  [de-identified] : Supple No LAD  [de-identified] : Clear [de-identified] : S1 S2  [de-identified] : No edema  [de-identified] : Soft NT/ND no masses  [de-identified] : No spine/CVA tenderness  [de-identified] : Erythematous plaques noted on arms and legs and trunk have resolved

## 2019-01-29 ENCOUNTER — APPOINTMENT (OUTPATIENT)
Dept: SPEECH THERAPY | Facility: CLINIC | Age: 79
End: 2019-01-29
Payer: MEDICARE

## 2019-01-29 ENCOUNTER — OUTPATIENT (OUTPATIENT)
Dept: OUTPATIENT SERVICES | Facility: HOSPITAL | Age: 79
LOS: 1 days | Discharge: HOME | End: 2019-01-29

## 2019-01-29 DIAGNOSIS — C34.12 MALIGNANT NEOPLASM OF UPPER LOBE, LEFT BRONCHUS OR LUNG: ICD-10-CM

## 2019-01-29 DIAGNOSIS — Z98.890 OTHER SPECIFIED POSTPROCEDURAL STATES: Chronic | ICD-10-CM

## 2019-01-29 PROCEDURE — G8997: CPT | Mod: CH

## 2019-01-29 PROCEDURE — G8996: CPT | Mod: CJ

## 2019-01-29 PROCEDURE — 92526 ORAL FUNCTION THERAPY: CPT | Mod: GN

## 2019-02-05 ENCOUNTER — APPOINTMENT (OUTPATIENT)
Dept: INFUSION THERAPY | Facility: HOSPITAL | Age: 79
End: 2019-02-05

## 2019-02-06 ENCOUNTER — LABORATORY RESULT (OUTPATIENT)
Age: 79
End: 2019-02-06

## 2019-02-06 ENCOUNTER — APPOINTMENT (OUTPATIENT)
Dept: INFUSION THERAPY | Facility: HOSPITAL | Age: 79
End: 2019-02-06

## 2019-02-07 DIAGNOSIS — Z51.11 ENCOUNTER FOR ANTINEOPLASTIC CHEMOTHERAPY: ICD-10-CM

## 2019-02-08 ENCOUNTER — APPOINTMENT (OUTPATIENT)
Dept: SPEECH THERAPY | Facility: CLINIC | Age: 79
End: 2019-02-08
Payer: MEDICARE

## 2019-02-08 PROCEDURE — G8997: CPT | Mod: CH

## 2019-02-08 PROCEDURE — G8996: CPT | Mod: CJ

## 2019-02-08 PROCEDURE — 92526 ORAL FUNCTION THERAPY: CPT

## 2019-02-15 ENCOUNTER — APPOINTMENT (OUTPATIENT)
Dept: SPEECH THERAPY | Facility: CLINIC | Age: 79
End: 2019-02-15
Payer: MEDICARE

## 2019-02-15 PROCEDURE — 92526 ORAL FUNCTION THERAPY: CPT | Mod: GN

## 2019-02-15 PROCEDURE — G8997: CPT | Mod: CH

## 2019-02-15 PROCEDURE — G8996: CPT | Mod: CJ

## 2019-02-20 ENCOUNTER — RESULT REVIEW (OUTPATIENT)
Age: 79
End: 2019-02-20

## 2019-02-20 ENCOUNTER — APPOINTMENT (OUTPATIENT)
Dept: INFUSION THERAPY | Facility: HOSPITAL | Age: 79
End: 2019-02-20

## 2019-02-20 ENCOUNTER — APPOINTMENT (OUTPATIENT)
Dept: HEMATOLOGY ONCOLOGY | Facility: CLINIC | Age: 79
End: 2019-02-20
Payer: MEDICARE

## 2019-02-20 VITALS
SYSTOLIC BLOOD PRESSURE: 124 MMHG | HEART RATE: 91 BPM | RESPIRATION RATE: 16 BRPM | WEIGHT: 126.77 LBS | OXYGEN SATURATION: 99 % | BODY MASS INDEX: 23.19 KG/M2 | TEMPERATURE: 98 F | DIASTOLIC BLOOD PRESSURE: 72 MMHG

## 2019-02-20 LAB
BASOPHILS # BLD AUTO: 0.1 K/UL — SIGNIFICANT CHANGE UP (ref 0–0.2)
BASOPHILS NFR BLD AUTO: 1 % — SIGNIFICANT CHANGE UP (ref 0–2)
EOSINOPHIL # BLD AUTO: 0.1 K/UL — SIGNIFICANT CHANGE UP (ref 0–0.5)
EOSINOPHIL NFR BLD AUTO: 1 % — SIGNIFICANT CHANGE UP (ref 0–6)
HCT VFR BLD CALC: 30.6 % — LOW (ref 34.5–45)
HGB BLD-MCNC: 10.6 G/DL — LOW (ref 11.5–15.5)
LYMPHOCYTES # BLD AUTO: 1.2 K/UL — SIGNIFICANT CHANGE UP (ref 1–3.3)
LYMPHOCYTES # BLD AUTO: 21.4 % — SIGNIFICANT CHANGE UP (ref 13–44)
MCHC RBC-ENTMCNC: 31 PG — SIGNIFICANT CHANGE UP (ref 27–34)
MCHC RBC-ENTMCNC: 34.5 G/DL — SIGNIFICANT CHANGE UP (ref 32–36)
MCV RBC AUTO: 89.8 FL — SIGNIFICANT CHANGE UP (ref 80–100)
MONOCYTES # BLD AUTO: 0.5 K/UL — SIGNIFICANT CHANGE UP (ref 0–0.9)
MONOCYTES NFR BLD AUTO: 9.6 % — SIGNIFICANT CHANGE UP (ref 2–14)
NEUTROPHILS # BLD AUTO: 3.7 K/UL — SIGNIFICANT CHANGE UP (ref 1.8–7.4)
NEUTROPHILS NFR BLD AUTO: 66.9 % — SIGNIFICANT CHANGE UP (ref 43–77)
PLATELET # BLD AUTO: 176 K/UL — SIGNIFICANT CHANGE UP (ref 150–400)
RBC # BLD: 3.41 M/UL — LOW (ref 3.8–5.2)
RBC # FLD: 12 % — SIGNIFICANT CHANGE UP (ref 10.3–14.5)
WBC # BLD: 5.6 K/UL — SIGNIFICANT CHANGE UP (ref 3.8–10.5)
WBC # FLD AUTO: 5.6 K/UL — SIGNIFICANT CHANGE UP (ref 3.8–10.5)

## 2019-02-20 PROCEDURE — 99214 OFFICE O/P EST MOD 30 MIN: CPT

## 2019-02-21 ENCOUNTER — APPOINTMENT (OUTPATIENT)
Dept: SPEECH THERAPY | Facility: CLINIC | Age: 79
End: 2019-02-21
Payer: MEDICARE

## 2019-02-21 PROCEDURE — G8996: CPT | Mod: CJ

## 2019-02-21 PROCEDURE — 92526 ORAL FUNCTION THERAPY: CPT

## 2019-02-21 PROCEDURE — G8997: CPT | Mod: CH

## 2019-02-27 ENCOUNTER — APPOINTMENT (OUTPATIENT)
Dept: SPEECH THERAPY | Facility: CLINIC | Age: 79
End: 2019-02-27
Payer: MEDICARE

## 2019-02-27 PROCEDURE — G8996: CPT | Mod: CI

## 2019-02-27 PROCEDURE — G8997: CPT | Mod: CH

## 2019-02-27 PROCEDURE — 92526 ORAL FUNCTION THERAPY: CPT

## 2019-04-04 ENCOUNTER — TRANSCRIPTION ENCOUNTER (OUTPATIENT)
Age: 79
End: 2019-04-04

## 2019-04-17 ENCOUNTER — APPOINTMENT (OUTPATIENT)
Dept: OTOLARYNGOLOGY | Facility: CLINIC | Age: 79
End: 2019-04-17
Payer: MEDICARE

## 2019-04-17 VITALS
DIASTOLIC BLOOD PRESSURE: 65 MMHG | SYSTOLIC BLOOD PRESSURE: 113 MMHG | HEIGHT: 62 IN | HEART RATE: 93 BPM | WEIGHT: 126 LBS | BODY MASS INDEX: 23.19 KG/M2

## 2019-04-17 PROCEDURE — 99214 OFFICE O/P EST MOD 30 MIN: CPT | Mod: 25

## 2019-04-17 PROCEDURE — 31579 LARYNGOSCOPY TELESCOPIC: CPT

## 2019-04-17 RX ORDER — OMEPRAZOLE 40 MG/1
40 CAPSULE, DELAYED RELEASE ORAL
Qty: 90 | Refills: 0 | Status: ACTIVE | COMMUNITY

## 2019-04-17 NOTE — REASON FOR VISIT
[Subsequent Evaluation] : a subsequent evaluation for [FreeTextEntry2] : 79 year old with family member for follow up evaluation

## 2019-04-17 NOTE — CONSULT LETTER
[Dear  ___] : Dear  [unfilled], [Please see my note below.] : Please see my note below. [Consult Letter:] : I had the pleasure of evaluating your patient, [unfilled]. [Consult Closing:] : Thank you very much for allowing me to participate in the care of this patient.  If you have any questions, please do not hesitate to contact me. [Sincerely,] : Sincerely, [FreeTextEntry3] : Negrito Bauer MD, PhD\par Chief, Division of Laryngology\par Department of Otolaryngology\par [facility]\par Pediatric Otolaryngology, [facility]\par  of Otolaryngology\par [facility] [employer] [facility]\par \par \par

## 2019-04-17 NOTE — HISTORY OF PRESENT ILLNESS
[de-identified] : 78yo F here for f/u VC paralysis. Has had injection laryngoplasty in 12/2017. Voice is slowly fading and she would like to do a more permanent procedure to get her voice back. Not noticing any improvement with VT. Taking OTC pepcid. Not on ranitidine, finsihed omeprazole. Has to drink slowly cannot drink fast or else she will cough and choke. MBS done in 01/2019 that showed laryngeal penetration without aspiration. Occasionally gets winded when speaking for long periods of time. Also when she is exerting herself. No longer able to sing. Voice with mild fade since last visit.\par If careful, liquids are ok.\par

## 2019-04-17 NOTE — PHYSICAL EXAM
[Midline] : trachea located in midline position [Laryngoscopy Performed] : laryngoscopy was performed, see procedure section for findings [Normal] : no rashes [de-identified] : moderately raspy and breathy

## 2019-04-25 ENCOUNTER — APPOINTMENT (OUTPATIENT)
Dept: OTOLARYNGOLOGY | Facility: CLINIC | Age: 79
End: 2019-04-25
Payer: MEDICARE

## 2019-04-25 PROCEDURE — 31574Z: CUSTOM

## 2019-04-25 PROCEDURE — 99214 OFFICE O/P EST MOD 30 MIN: CPT | Mod: 25

## 2019-04-25 NOTE — CONSULT LETTER
[Dear  ___] : Dear  [unfilled], [Consult Letter:] : I had the pleasure of evaluating your patient, [unfilled]. [Sincerely,] : Sincerely, [Consult Closing:] : Thank you very much for allowing me to participate in the care of this patient.  If you have any questions, please do not hesitate to contact me. [Please see my note below.] : Please see my note below.

## 2019-04-25 NOTE — PHYSICAL EXAM
[Midline] : trachea located in midline position [Laryngoscopy Performed] : laryngoscopy was performed, see procedure section for findings [Normal] : orientation to person, place, and time: normal [FreeTextEntry1] : significantly improved voice which minimal raspy, minimal breathy [de-identified] : moderately raspy and breathy

## 2019-04-25 NOTE — HISTORY OF PRESENT ILLNESS
[de-identified] : 80yo F here with continued dysphonia and dysphagia. Here today for injection laryngoplasty. Breathing ok. No SOB. Voice is weaker for the past few months.\par PMH: hypothyroid and htn, no DM\par

## 2019-05-09 ENCOUNTER — APPOINTMENT (OUTPATIENT)
Dept: RADIATION ONCOLOGY | Facility: CLINIC | Age: 79
End: 2019-05-09
Payer: MEDICARE

## 2019-05-09 VITALS
SYSTOLIC BLOOD PRESSURE: 125 MMHG | OXYGEN SATURATION: 96 % | RESPIRATION RATE: 14 BRPM | WEIGHT: 124.34 LBS | HEART RATE: 84 BPM | TEMPERATURE: 98.06 F | DIASTOLIC BLOOD PRESSURE: 71 MMHG | BODY MASS INDEX: 22.74 KG/M2

## 2019-05-09 PROCEDURE — 99213 OFFICE O/P EST LOW 20 MIN: CPT | Mod: GC

## 2019-05-09 NOTE — PHYSICAL EXAM
[Outer Ear] : the ears and nose were normal in appearance [Sclera] : the sclera and conjunctiva were normal [Nondistended] : nondistended [Abdomen Soft] : soft [Normal] : normal skin color and pigmentation and no rash

## 2019-05-14 NOTE — HISTORY OF PRESENT ILLNESS
[FreeTextEntry1] : Mrs. 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 She now returns for routine f/u.\par \par Since her last visit she has completed 1 year of durvalumab on 2/20/19. She reports that her RHODES has improved. Reports minimal cough. Reports dysguesia but denies dysphagia. She is maintaining her weight. No focal weakness or numbness. No headaches.\par Recently had injection with ENT for vocal cord paralysis which helped.\par \par Follow up CT planned for 5/22.

## 2019-05-14 NOTE — REVIEW OF SYSTEMS
[SOB on Exertion] : shortness of breath during exertion [Cough] : cough [Constipation] : constipation [Dizziness] : dizziness [Negative] : Heme/Lymph [Diarrhea: Grade 0] : Diarrhea: Grade 0 [Constipation: Grade 1 - Occasional or intermittent symptoms; occasional use of stool softeners, laxatives, dietary modification, or enema] : Constipation: Grade 1 - Occasional or intermittent symptoms; occasional use of stool softeners, laxatives, dietary modification, or enema [Fatigue: Grade 0] : Fatigue: Grade 0 [Dyspnea: Grade 1 - Shortness of breath with moderate exertion] : Dyspnea: Grade 1 - Shortness of breath with moderate exertion [Cough: Grade 1 - Mild symptoms; nonprescription intervention indicated] : Cough: Grade 1 - Mild symptoms; nonprescription intervention indicated [FreeTextEntry6] : dry

## 2019-05-14 NOTE — REASON FOR VISIT
[Routine Follow-Up] : routine follow-up visit for [Lung Cancer] : lung cancer [Spouse] : spouse [[employer] ] : provided by [employer]   [FreeTextEntry1] : [id number]

## 2019-05-19 ENCOUNTER — FORM ENCOUNTER (OUTPATIENT)
Age: 79
End: 2019-05-19

## 2019-05-20 ENCOUNTER — OUTPATIENT (OUTPATIENT)
Dept: OUTPATIENT SERVICES | Facility: HOSPITAL | Age: 79
LOS: 1 days | End: 2019-05-20
Payer: MEDICARE

## 2019-05-20 ENCOUNTER — APPOINTMENT (OUTPATIENT)
Dept: CT IMAGING | Facility: IMAGING CENTER | Age: 79
End: 2019-05-20
Payer: MEDICARE

## 2019-05-20 DIAGNOSIS — C34.12 MALIGNANT NEOPLASM OF UPPER LOBE, LEFT BRONCHUS OR LUNG: ICD-10-CM

## 2019-05-20 DIAGNOSIS — Z98.890 OTHER SPECIFIED POSTPROCEDURAL STATES: Chronic | ICD-10-CM

## 2019-05-20 PROCEDURE — 71250 CT THORAX DX C-: CPT

## 2019-05-20 PROCEDURE — 71250 CT THORAX DX C-: CPT | Mod: 26

## 2019-05-22 ENCOUNTER — OUTPATIENT (OUTPATIENT)
Dept: OUTPATIENT SERVICES | Facility: HOSPITAL | Age: 79
LOS: 1 days | Discharge: HOME | End: 2019-05-22

## 2019-05-22 ENCOUNTER — RESULT REVIEW (OUTPATIENT)
Age: 79
End: 2019-05-22

## 2019-05-22 ENCOUNTER — APPOINTMENT (OUTPATIENT)
Dept: HEMATOLOGY ONCOLOGY | Facility: CLINIC | Age: 79
End: 2019-05-22
Payer: MEDICARE

## 2019-05-22 VITALS
DIASTOLIC BLOOD PRESSURE: 70 MMHG | BODY MASS INDEX: 22.78 KG/M2 | SYSTOLIC BLOOD PRESSURE: 120 MMHG | RESPIRATION RATE: 16 BRPM | WEIGHT: 124.56 LBS | OXYGEN SATURATION: 96 % | HEART RATE: 85 BPM | TEMPERATURE: 98 F

## 2019-05-22 DIAGNOSIS — Z98.890 OTHER SPECIFIED POSTPROCEDURAL STATES: Chronic | ICD-10-CM

## 2019-05-22 DIAGNOSIS — C34.12 MALIGNANT NEOPLASM OF UPPER LOBE, LEFT BRONCHUS OR LUNG: ICD-10-CM

## 2019-05-22 LAB
BASOPHILS # BLD AUTO: 0 K/UL — SIGNIFICANT CHANGE UP (ref 0–0.2)
BASOPHILS NFR BLD AUTO: 0.6 % — SIGNIFICANT CHANGE UP (ref 0–2)
EOSINOPHIL # BLD AUTO: 0 K/UL — SIGNIFICANT CHANGE UP (ref 0–0.5)
EOSINOPHIL NFR BLD AUTO: 0.7 % — SIGNIFICANT CHANGE UP (ref 0–6)
HCT VFR BLD CALC: 36 % — SIGNIFICANT CHANGE UP (ref 34.5–45)
HGB BLD-MCNC: 12.4 G/DL — SIGNIFICANT CHANGE UP (ref 11.5–15.5)
LYMPHOCYTES # BLD AUTO: 1.6 K/UL — SIGNIFICANT CHANGE UP (ref 1–3.3)
LYMPHOCYTES # BLD AUTO: 25 % — SIGNIFICANT CHANGE UP (ref 13–44)
MCHC RBC-ENTMCNC: 30.6 PG — SIGNIFICANT CHANGE UP (ref 27–34)
MCHC RBC-ENTMCNC: 34.5 G/DL — SIGNIFICANT CHANGE UP (ref 32–36)
MCV RBC AUTO: 88.8 FL — SIGNIFICANT CHANGE UP (ref 80–100)
MONOCYTES # BLD AUTO: 0.6 K/UL — SIGNIFICANT CHANGE UP (ref 0–0.9)
MONOCYTES NFR BLD AUTO: 9.7 % — SIGNIFICANT CHANGE UP (ref 2–14)
NEUTROPHILS # BLD AUTO: 3.9 K/UL — SIGNIFICANT CHANGE UP (ref 1.8–7.4)
NEUTROPHILS NFR BLD AUTO: 63.9 % — SIGNIFICANT CHANGE UP (ref 43–77)
PLATELET # BLD AUTO: 213 K/UL — SIGNIFICANT CHANGE UP (ref 150–400)
RBC # BLD: 4.05 M/UL — SIGNIFICANT CHANGE UP (ref 3.8–5.2)
RBC # FLD: 12.4 % — SIGNIFICANT CHANGE UP (ref 10.3–14.5)
WBC # BLD: 6.2 K/UL — SIGNIFICANT CHANGE UP (ref 3.8–10.5)
WBC # FLD AUTO: 6.2 K/UL — SIGNIFICANT CHANGE UP (ref 3.8–10.5)

## 2019-05-22 PROCEDURE — 99214 OFFICE O/P EST MOD 30 MIN: CPT

## 2019-05-23 LAB
ALBUMIN SERPL ELPH-MCNC: 5.1 G/DL
ALP BLD-CCNC: 116 U/L
ALT SERPL-CCNC: 18 U/L
ANION GAP SERPL CALC-SCNC: 12 MMOL/L
AST SERPL-CCNC: 25 U/L
BILIRUB SERPL-MCNC: 0.3 MG/DL
BUN SERPL-MCNC: 21 MG/DL
CALCIUM SERPL-MCNC: 10.3 MG/DL
CHLORIDE SERPL-SCNC: 102 MMOL/L
CO2 SERPL-SCNC: 26 MMOL/L
CREAT SERPL-MCNC: 0.87 MG/DL
FERRITIN SERPL-MCNC: 222 NG/ML
FOLATE SERPL-MCNC: 14.7 NG/ML
GLUCOSE SERPL-MCNC: 90 MG/DL
IRON SATN MFR SERPL: 23 %
IRON SERPL-MCNC: 72 UG/DL
POTASSIUM SERPL-SCNC: 4.7 MMOL/L
PROT SERPL-MCNC: 8.6 G/DL
SODIUM SERPL-SCNC: 139 MMOL/L
TIBC SERPL-MCNC: 316 UG/DL
UIBC SERPL-MCNC: 244 UG/DL
VIT B12 SERPL-MCNC: 760 PG/ML

## 2019-06-05 ENCOUNTER — APPOINTMENT (OUTPATIENT)
Dept: OTOLARYNGOLOGY | Facility: CLINIC | Age: 79
End: 2019-06-05
Payer: MEDICARE

## 2019-06-05 VITALS
WEIGHT: 125 LBS | RESPIRATION RATE: 76 BRPM | BODY MASS INDEX: 23 KG/M2 | SYSTOLIC BLOOD PRESSURE: 105 MMHG | DIASTOLIC BLOOD PRESSURE: 65 MMHG | HEIGHT: 62 IN

## 2019-06-05 PROCEDURE — 31579 LARYNGOSCOPY TELESCOPIC: CPT

## 2019-06-05 PROCEDURE — 99214 OFFICE O/P EST MOD 30 MIN: CPT | Mod: 25

## 2019-06-05 RX ORDER — METHYLPREDNISOLONE 4 MG/1
4 TABLET ORAL
Qty: 1 | Refills: 0 | Status: DISCONTINUED | COMMUNITY
Start: 2019-04-25 | End: 2019-06-05

## 2019-06-30 NOTE — CONSULT LETTER
[Dear  ___] : Dear  [unfilled], [Sincerely,] : Sincerely, [Please see my note below.] : Please see my note below. [Consult Letter:] : I had the pleasure of evaluating your patient, [unfilled]. [FreeTextEntry3] : Negrito Bauer MD, PhD\par Chief, Division of Laryngology\par Department of Otolaryngology\par [facility]\par Pediatric Otolaryngology, [facility]\par  of Otolaryngology\par [facility] [employer]\par \par \par  [Consult Closing:] : Thank you very much for allowing me to participate in the care of this patient.  If you have any questions, please do not hesitate to contact me.

## 2019-06-30 NOTE — REASON FOR VISIT
[Subsequent Evaluation] : a subsequent evaluation for [FreeTextEntry2] : 1 month f/u to check vocal cords

## 2019-06-30 NOTE — HISTORY OF PRESENT ILLNESS
[de-identified] : 80yo F h/o UVFI, dysphonia and dysphagia, here for f/u injection laryngoplasty. Doing well post injection. Breathing well. Swallowing has been ok occasional choking when she is rushing but she tries to drink slowly. Otherwise voice is strong and she is happy. Feeling more confident and easier to communicate. No hemoptysis. Recovery was relatively quick.\par \par

## 2019-09-08 ENCOUNTER — FORM ENCOUNTER (OUTPATIENT)
Age: 79
End: 2019-09-08

## 2019-09-09 ENCOUNTER — OUTPATIENT (OUTPATIENT)
Dept: OUTPATIENT SERVICES | Facility: HOSPITAL | Age: 79
LOS: 1 days | End: 2019-09-09
Payer: MEDICARE

## 2019-09-09 ENCOUNTER — OUTPATIENT (OUTPATIENT)
Dept: OUTPATIENT SERVICES | Facility: HOSPITAL | Age: 79
LOS: 1 days | Discharge: HOME | End: 2019-09-09

## 2019-09-09 ENCOUNTER — APPOINTMENT (OUTPATIENT)
Dept: CT IMAGING | Facility: IMAGING CENTER | Age: 79
End: 2019-09-09
Payer: MEDICARE

## 2019-09-09 DIAGNOSIS — C34.12 MALIGNANT NEOPLASM OF UPPER LOBE, LEFT BRONCHUS OR LUNG: ICD-10-CM

## 2019-09-09 DIAGNOSIS — Z98.890 OTHER SPECIFIED POSTPROCEDURAL STATES: Chronic | ICD-10-CM

## 2019-09-09 PROCEDURE — 71250 CT THORAX DX C-: CPT | Mod: 26

## 2019-09-09 PROCEDURE — 71250 CT THORAX DX C-: CPT

## 2019-09-11 ENCOUNTER — APPOINTMENT (OUTPATIENT)
Dept: HEMATOLOGY ONCOLOGY | Facility: CLINIC | Age: 79
End: 2019-09-11
Payer: MEDICARE

## 2019-09-11 VITALS
RESPIRATION RATE: 18 BRPM | SYSTOLIC BLOOD PRESSURE: 120 MMHG | DIASTOLIC BLOOD PRESSURE: 70 MMHG | BODY MASS INDEX: 22.98 KG/M2 | WEIGHT: 125.66 LBS | HEART RATE: 103 BPM | TEMPERATURE: 98.2 F | OXYGEN SATURATION: 99 %

## 2019-09-11 PROCEDURE — 99215 OFFICE O/P EST HI 40 MIN: CPT

## 2019-09-12 ENCOUNTER — FORM ENCOUNTER (OUTPATIENT)
Age: 79
End: 2019-09-12

## 2019-09-13 ENCOUNTER — APPOINTMENT (OUTPATIENT)
Dept: NUCLEAR MEDICINE | Facility: IMAGING CENTER | Age: 79
End: 2019-09-13
Payer: MEDICARE

## 2019-09-13 ENCOUNTER — OUTPATIENT (OUTPATIENT)
Dept: OUTPATIENT SERVICES | Facility: HOSPITAL | Age: 79
LOS: 1 days | End: 2019-09-13
Payer: MEDICARE

## 2019-09-13 DIAGNOSIS — Z98.890 OTHER SPECIFIED POSTPROCEDURAL STATES: Chronic | ICD-10-CM

## 2019-09-13 DIAGNOSIS — C34.12 MALIGNANT NEOPLASM OF UPPER LOBE, LEFT BRONCHUS OR LUNG: ICD-10-CM

## 2019-09-13 PROCEDURE — 78815 PET IMAGE W/CT SKULL-THIGH: CPT | Mod: 26,PI

## 2019-09-13 PROCEDURE — A9552: CPT

## 2019-09-13 PROCEDURE — 78815 PET IMAGE W/CT SKULL-THIGH: CPT

## 2019-09-26 ENCOUNTER — APPOINTMENT (OUTPATIENT)
Dept: THORACIC SURGERY | Facility: CLINIC | Age: 79
End: 2019-09-26
Payer: MEDICARE

## 2019-09-26 VITALS
OXYGEN SATURATION: 98 % | WEIGHT: 126 LBS | SYSTOLIC BLOOD PRESSURE: 112 MMHG | HEART RATE: 89 BPM | BODY MASS INDEX: 23.05 KG/M2 | RESPIRATION RATE: 16 BRPM | DIASTOLIC BLOOD PRESSURE: 70 MMHG

## 2019-09-26 PROCEDURE — 99214 OFFICE O/P EST MOD 30 MIN: CPT

## 2019-09-27 ENCOUNTER — OUTPATIENT (OUTPATIENT)
Dept: OUTPATIENT SERVICES | Facility: HOSPITAL | Age: 79
LOS: 1 days | End: 2019-09-27
Payer: MEDICARE

## 2019-09-27 VITALS
SYSTOLIC BLOOD PRESSURE: 166 MMHG | TEMPERATURE: 97 F | HEART RATE: 74 BPM | HEIGHT: 62 IN | DIASTOLIC BLOOD PRESSURE: 60 MMHG | RESPIRATION RATE: 16 BRPM | WEIGHT: 125 LBS

## 2019-09-27 DIAGNOSIS — Z98.890 OTHER SPECIFIED POSTPROCEDURAL STATES: Chronic | ICD-10-CM

## 2019-09-27 DIAGNOSIS — C34.90 MALIGNANT NEOPLASM OF UNSPECIFIED PART OF UNSPECIFIED BRONCHUS OR LUNG: ICD-10-CM

## 2019-09-27 DIAGNOSIS — E03.9 HYPOTHYROIDISM, UNSPECIFIED: ICD-10-CM

## 2019-09-27 DIAGNOSIS — I10 ESSENTIAL (PRIMARY) HYPERTENSION: ICD-10-CM

## 2019-09-27 LAB
ALBUMIN SERPL ELPH-MCNC: 4.8 G/DL — SIGNIFICANT CHANGE UP (ref 3.3–5)
ALP SERPL-CCNC: 108 U/L — SIGNIFICANT CHANGE UP (ref 40–120)
ALT FLD-CCNC: 14 U/L — SIGNIFICANT CHANGE UP (ref 4–33)
ANION GAP SERPL CALC-SCNC: 15 MMO/L — HIGH (ref 7–14)
AST SERPL-CCNC: 25 U/L — SIGNIFICANT CHANGE UP (ref 4–32)
BILIRUB SERPL-MCNC: 0.4 MG/DL — SIGNIFICANT CHANGE UP (ref 0.2–1.2)
BUN SERPL-MCNC: 19 MG/DL — SIGNIFICANT CHANGE UP (ref 7–23)
CALCIUM SERPL-MCNC: 9.9 MG/DL — SIGNIFICANT CHANGE UP (ref 8.4–10.5)
CHLORIDE SERPL-SCNC: 101 MMOL/L — SIGNIFICANT CHANGE UP (ref 98–107)
CO2 SERPL-SCNC: 23 MMOL/L — SIGNIFICANT CHANGE UP (ref 22–31)
CREAT SERPL-MCNC: 0.88 MG/DL — SIGNIFICANT CHANGE UP (ref 0.5–1.3)
GLUCOSE SERPL-MCNC: 94 MG/DL — SIGNIFICANT CHANGE UP (ref 70–99)
HCT VFR BLD CALC: 37.6 % — SIGNIFICANT CHANGE UP (ref 34.5–45)
HGB BLD-MCNC: 12 G/DL — SIGNIFICANT CHANGE UP (ref 11.5–15.5)
MCHC RBC-ENTMCNC: 27.8 PG — SIGNIFICANT CHANGE UP (ref 27–34)
MCHC RBC-ENTMCNC: 31.9 % — LOW (ref 32–36)
MCV RBC AUTO: 87 FL — SIGNIFICANT CHANGE UP (ref 80–100)
NRBC # FLD: 0 K/UL — SIGNIFICANT CHANGE UP (ref 0–0)
PLATELET # BLD AUTO: 222 K/UL — SIGNIFICANT CHANGE UP (ref 150–400)
PMV BLD: 10.6 FL — SIGNIFICANT CHANGE UP (ref 7–13)
POTASSIUM SERPL-MCNC: 4.2 MMOL/L — SIGNIFICANT CHANGE UP (ref 3.5–5.3)
POTASSIUM SERPL-SCNC: 4.2 MMOL/L — SIGNIFICANT CHANGE UP (ref 3.5–5.3)
PROT SERPL-MCNC: 9.1 G/DL — HIGH (ref 6–8.3)
RBC # BLD: 4.32 M/UL — SIGNIFICANT CHANGE UP (ref 3.8–5.2)
RBC # FLD: 14.2 % — SIGNIFICANT CHANGE UP (ref 10.3–14.5)
SODIUM SERPL-SCNC: 139 MMOL/L — SIGNIFICANT CHANGE UP (ref 135–145)
WBC # BLD: 6.83 K/UL — SIGNIFICANT CHANGE UP (ref 3.8–10.5)
WBC # FLD AUTO: 6.83 K/UL — SIGNIFICANT CHANGE UP (ref 3.8–10.5)

## 2019-09-27 PROCEDURE — 93010 ELECTROCARDIOGRAM REPORT: CPT

## 2019-09-27 RX ORDER — OMEPRAZOLE 10 MG/1
1 CAPSULE, DELAYED RELEASE ORAL
Qty: 0 | Refills: 0 | DISCHARGE

## 2019-09-27 RX ORDER — SODIUM CHLORIDE 9 MG/ML
1000 INJECTION, SOLUTION INTRAVENOUS
Refills: 0 | Status: DISCONTINUED | OUTPATIENT
Start: 2019-10-01 | End: 2019-10-22

## 2019-09-27 RX ORDER — ACETAMINOPHEN 500 MG
2 TABLET ORAL
Qty: 0 | Refills: 0 | DISCHARGE

## 2019-09-27 NOTE — H&P PST ADULT - HISTORY OF PRESENT ILLNESS
76 yo Turkish speaking  female with PMH hypothyroidism, GERD, hypertension, and asthma and  cough for over 1 year.  n 9/7/17 that confirmed lung malignancy and pt received chemo and radiation last tx  2017 followed by immunotherapy-completed 02/2019 76 yo Thai speaking  female with PMH hypothyroidism, GERD, hypertension, and asthma and cough for over 1 year diagnosed with lung malignancy in 2017, pt received chemo and radiation last tx  2017 followed by immunotherapy-completed 02/2019. She had a PET /CT scan 09/13/2019 suspicious of recurrence of lung cancer now scheduled for flexible bronchoscopy, endobronchial ultrasound biopsy with cytology on 10/01/2019.

## 2019-09-27 NOTE — H&P PST ADULT - NEGATIVE NEUROLOGICAL SYMPTOMS
no generalized seizures/no difficulty walking/no transient paralysis/no weakness/no loss of sensation/no headache/no vertigo

## 2019-09-27 NOTE — H&P PST ADULT - NSICDXPASTMEDICALHX_GEN_ALL_CORE_FT
PAST MEDICAL HISTORY:  Asthma     GERD (gastroesophageal reflux disease)     Hypertension     Hypothyroidism     Lung cancer left upper lobe - receiving chemo and radiation    Other nonspecific abnormal finding of lung field

## 2019-09-27 NOTE — PHYSICAL EXAM
[Auscultation Breath Sounds / Voice Sounds] : lungs were clear to auscultation bilaterally [Heart Rate And Rhythm] : heart rate was normal and rhythm regular [Heart Sounds] : normal S1 and S2 [Heart Sounds Gallop] : no gallops [Murmurs] : no murmurs [Heart Sounds Pericardial Friction Rub] : no pericardial rub [Diminished Respiratory Excursion] : normal chest expansion [Chest Visual Inspection Thoracic Asymmetry] : no chest asymmetry [Examination Of The Chest] : the chest was normal in appearance [Abdomen Soft] : soft [Bowel Sounds] : normal bowel sounds [Abdomen Tenderness] : non-tender [Abdomen Mass (___ Cm)] : no abdominal mass palpated [Nail Clubbing] : no clubbing  or cyanosis of the fingernails [Musculoskeletal - Swelling] : no joint swelling seen [Abnormal Walk] : normal gait [Motor Tone] : muscle strength and tone were normal [Skin Color & Pigmentation] : normal skin color and pigmentation [Skin Turgor] : normal skin turgor [Deep Tendon Reflexes (DTR)] : deep tendon reflexes were 2+ and symmetric [] : no rash [No Focal Deficits] : no focal deficits [Sensation] : the sensory exam was normal to light touch and pinprick [Oriented To Time, Place, And Person] : oriented to person, place, and time [Impaired Insight] : insight and judgment were intact [Affect] : the affect was normal

## 2019-09-27 NOTE — H&P PST ADULT - NSICDXFAMILYHX_GEN_ALL_CORE_FT
FAMILY HISTORY:  Sibling  Still living? Yes, Estimated age: Age Unknown  Family history of cancer, Age at diagnosis: Age Unknown

## 2019-09-27 NOTE — H&P PST ADULT - LYMPHATIC
anterior cervical L/anterior cervical R/supraclavicular L/supraclavicular R/posterior cervical L/posterior cervical R

## 2019-09-27 NOTE — H&P PST ADULT - NEGATIVE CARDIOVASCULAR SYMPTOMS
no dyspnea on exertion/no orthopnea/no chest pain/no paroxysmal nocturnal dyspnea/no claudication/no palpitations

## 2019-09-27 NOTE — H&P PST ADULT - NEGATIVE ENMT SYMPTOMS
no nasal congestion/no hearing difficulty/no ear pain/no dysphagia/no vertigo/no throat pain/no tinnitus

## 2019-09-27 NOTE — H&P PST ADULT - RS GEN PE MLT RESP DETAILS PC
good air movement/no rales/no subcutaneous emphysema/airway patent/no chest wall tenderness/no rhonchi/no wheezes/breath sounds equal/respirations non-labored/no intercostal retractions respirations non-labored/no rhonchi/no intercostal retractions/no rales/good air movement/wheezes/airway patent/breath sounds equal/no subcutaneous emphysema/no chest wall tenderness

## 2019-09-27 NOTE — H&P PST ADULT - ASSESSMENT
76 yo female with PMH hypothyroidism, GERD, hypertension, and asthma and  with cough for over 1 year 78 yo female with PMH hypothyroidism, GERD, hypertension, and asthma and  with recurrence of lung cancer   scheduled for flexible bronchoscopy, endobronchial ultrasound biopsy with cytology on 10/01/2019.

## 2019-09-27 NOTE — H&P PST ADULT - NSICDXPROBLEM_GEN_ALL_CORE_FT
PROBLEM DIAGNOSES  Problem: Lung cancer  Assessment and Plan: scheduled for flexible bronchoscopy, endobronchial ultrasound biopsy with cytology on 10/01/2019.   Verbal and written pre-op instructions provided to patient. Patient verbalized understanding.   continue Omeprazole for GI prophylaxis     Problem: Hypertension  Assessment and Plan: Pt. instructed to continue medications as prescribed.     Problem: Hypothyroidism  Assessment and Plan: Pt. instructed to continue medications as prescribed.

## 2019-09-27 NOTE — H&P PST ADULT - NEGATIVE MUSCULOSKELETAL SYMPTOMS
no joint swelling/no stiffness/no leg pain L/no back pain/no leg pain R/no muscle weakness/no arthralgia/no neck pain/no arm pain L/no arm pain R/no myalgia/no muscle cramps

## 2019-09-30 ENCOUNTER — TRANSCRIPTION ENCOUNTER (OUTPATIENT)
Age: 79
End: 2019-09-30

## 2019-09-30 ENCOUNTER — FORM ENCOUNTER (OUTPATIENT)
Age: 79
End: 2019-09-30

## 2019-09-30 NOTE — CONSULT LETTER
[Dear  ___] : Dear  [unfilled], [Consult Letter:] : I had the pleasure of evaluating your patient, [unfilled]. [( Thank you for referring [unfilled] for consultation for _____ )] : Thank you for referring [unfilled] for consultation for [unfilled] [Consult Closing:] : Thank you very much for allowing me to participate in the care of this patient.  If you have any questions, please do not hesitate to contact me. [Please see my note below.] : Please see my note below. [Sincerely,] : Sincerely, [DrJyotsna  ___] : Dr. NUÑEZ [DrJyotsna ___] : Dr. NUÑEZ [FreeTextEntry2] : Dominick Portillo MD (Referring)\maura Berkowitz MD (Hem/Onc) \maura Chase MD (Rad/Onc) [FreeTextEntry3] : Cleve Liang MD, MPH \par System Director of Thoracic Surgery \par Director of Comprehensive Lung and Foregut [place] \par Professor Cardiovascular & Thoracic Surgery  \par [facility]\par

## 2019-09-30 NOTE — HISTORY OF PRESENT ILLNESS
[FreeTextEntry1] : 80 y/o F, never smoker, w/ hx of asthma and clinical Stg III SCCA. \par \par Chest CT from 8/30/17 revealed a 5.6 x 3.9 x 4.9 cm LEFT upper lobe mass extending into the TA, also the mediastinum, inseparable from the aortic arch & descending thoracic aorta. Also extending to the AP window region, with mild narrowing of the Left mainstem bronchus; inseparable from the hilum, with marked narrowing & possible occlusion of the Left main pulmonary artery. 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 Path of Lt paratracheal mass showed atypical findings. TA core bx was non diagnostic.\par Now s/p Flex Broncho Cervical Mediastinoscopy on 9/19/17. Path revealed Metastatic Squamous cell CA to the Lt paratracheal mass; Lvl 4 and 7 LNs were negative for malignancy.\par \par Chemo/XRT with Dr. Jose Berkowitz and Dr. Chase, completed definitive chemo/RT (60Gy in 30 fractions) in mid December 2017. Treated with maintenance Durvalumab x 1 year completed Feb 2019. \par \par PET/CT on 9/13/19:\par - diffusely increased FDG avidity within the enlarged thyroid gland, SUV=7.8\par - FDG avid focus right maxillary incisor, probably periodontal disease, SUV= 4.4\par - FDG avid soft tissue left paramediastinal region, measuring 3.0 cm, SUV=18.2, suspicious for recurrent disease\par - small left hilar focus, just posterior to the left mainstem bronchus, SUV=13.2 \par - post-radiation changes\par - stable small left pleural effusion\par \par Pt presents today for follow up. Admits to mild dry cough and mild SOB on exertion. \par

## 2019-09-30 NOTE — ASSESSMENT
[FreeTextEntry1] : 80 y/o F, never smoker, w/ hx of asthma and clinical Stg III SCCA.\par \par Now s/p Flex Bronch bx of TA bronchus, BAL, EBUS, core bx of the Lt paratracheal mass on 9/7/17. Path of Lt paratracheal mass showed atypical findings. TA core bx was non diagnostic.\par Now s/p Flex Broncho Cervical Mediastinoscopy on 9/19/17. Path revealed Metastatic Squamous cell CA to the Lt paratracheal mass; Lvl 4 and 7 LNs were negative for malignancy.\par \par Chemo/XRT with Dr. Jose Berkowitz and Dr. Chase, completed definitive chemo/RT (60Gy in 30 fractions) in mid December 2017. Treated with maintenance Durvalumab x 1 year completed Feb 2019. \par \par PET/CT on 9/13/19:\par - diffusely increased FDG avidity within the enlarged thyroid gland, SUV=7.8\par - FDG avid focus right maxillary incisor, probably periodontal disease, SUV= 4.4\par - FDG avid soft tissue left paramediastinal region, measuring 3.0 cm, SUV=18.2, suspicious for recurrent disease\par - small left hilar focus, just posterior to the left mainstem bronchus, SUV=13.2 \par - post-radiation changes\par - stable small left pleural effusion\par \par I have reviewed the patient's medical records and diagnostic images at time of this office consultation and have made the following recommendation:\par 1. Completed chemo/XRT, current PET findings are suspicious for recurrent disease, I recommended a Flex Bronch EBUS Bx on 10/2/19. Risks and benefits and alternatives explained to patient, all questions answered, patient agreed to proceed with procedure.\par 2. Medical clearance and PST\par \par \par Written by Jorge L Jc NP, acting as a scribe for Dr. Cleve Liang. \par \par The documentation recorded by the scribe accurately reflects the service I personally performed and the decisions made by me. CLEVE LIANG MD\par

## 2019-10-01 ENCOUNTER — OUTPATIENT (OUTPATIENT)
Dept: OUTPATIENT SERVICES | Facility: HOSPITAL | Age: 79
LOS: 1 days | Discharge: HOME | End: 2019-10-01
Payer: MEDICARE

## 2019-10-01 ENCOUNTER — RESULT REVIEW (OUTPATIENT)
Age: 79
End: 2019-10-01

## 2019-10-01 ENCOUNTER — APPOINTMENT (OUTPATIENT)
Dept: THORACIC SURGERY | Facility: HOSPITAL | Age: 79
End: 2019-10-01

## 2019-10-01 VITALS
WEIGHT: 125 LBS | HEART RATE: 97 BPM | TEMPERATURE: 99 F | SYSTOLIC BLOOD PRESSURE: 122 MMHG | HEIGHT: 62 IN | RESPIRATION RATE: 14 BRPM | DIASTOLIC BLOOD PRESSURE: 59 MMHG | OXYGEN SATURATION: 98 %

## 2019-10-01 VITALS
RESPIRATION RATE: 16 BRPM | SYSTOLIC BLOOD PRESSURE: 110 MMHG | HEART RATE: 80 BPM | OXYGEN SATURATION: 96 % | DIASTOLIC BLOOD PRESSURE: 65 MMHG

## 2019-10-01 DIAGNOSIS — Z98.890 OTHER SPECIFIED POSTPROCEDURAL STATES: Chronic | ICD-10-CM

## 2019-10-01 DIAGNOSIS — C34.90 MALIGNANT NEOPLASM OF UNSPECIFIED PART OF UNSPECIFIED BRONCHUS OR LUNG: ICD-10-CM

## 2019-10-01 PROCEDURE — 31652 BRONCH EBUS SAMPLNG 1/2 NODE: CPT

## 2019-10-01 PROCEDURE — 31645 BRNCHSC W/THER ASPIR 1ST: CPT

## 2019-10-01 PROCEDURE — 31629 BRONCHOSCOPY/NEEDLE BX EACH: CPT

## 2019-10-01 PROCEDURE — 88305 TISSUE EXAM BY PATHOLOGIST: CPT | Mod: 26

## 2019-10-01 PROCEDURE — 31624 DX BRONCHOSCOPE/LAVAGE: CPT

## 2019-10-01 PROCEDURE — 71045 X-RAY EXAM CHEST 1 VIEW: CPT | Mod: 26

## 2019-10-01 PROCEDURE — 88112 CYTOPATH CELL ENHANCE TECH: CPT | Mod: 26,59

## 2019-10-01 PROCEDURE — 88173 CYTOPATH EVAL FNA REPORT: CPT | Mod: 26

## 2019-10-01 RX ORDER — SODIUM CHLORIDE 9 MG/ML
1000 INJECTION, SOLUTION INTRAVENOUS
Refills: 0 | Status: DISCONTINUED | OUTPATIENT
Start: 2019-10-01 | End: 2019-10-22

## 2019-10-01 RX ORDER — FENTANYL CITRATE 50 UG/ML
25 INJECTION INTRAVENOUS
Refills: 0 | Status: DISCONTINUED | OUTPATIENT
Start: 2019-10-01 | End: 2019-10-01

## 2019-10-01 RX ORDER — ONDANSETRON 8 MG/1
4 TABLET, FILM COATED ORAL ONCE
Refills: 0 | Status: DISCONTINUED | OUTPATIENT
Start: 2019-10-01 | End: 2019-10-22

## 2019-10-01 NOTE — ASU DISCHARGE PLAN (ADULT/PEDIATRIC) - CARE PROVIDER_API CALL
Cleve Liang (MD)  Surgery; Thoracic Surgery  [id number] [place] [address]  Phone: [phone]  Fax: [phone]  Follow Up Time:

## 2019-10-01 NOTE — BRIEF OPERATIVE NOTE - NSICDXBRIEFPREOP_GEN_ALL_CORE_FT
PRE-OP DIAGNOSIS:  Thoracic lymphadenopathy 01-Oct-2019 11:44:57  Rafael Quintana PRE-OP DIAGNOSIS:  History of lung cancer 01-Oct-2019 12:46:59  Rafael Quintana  Thoracic lymphadenopathy 01-Oct-2019 11:44:57  Rafael Quintana

## 2019-10-01 NOTE — ASU DISCHARGE PLAN (ADULT/PEDIATRIC) - CALL YOUR DOCTOR IF YOU HAVE ANY OF THE FOLLOWING:
Inability to tolerate liquids or foods/Swelling that gets worse Nausea and vomiting that does not stop/Pain not relieved by Medications/Bleeding that does not stop/Fever greater than (need to indicate Fahrenheit or Celsius)/Inability to tolerate liquids or foods/Numbness, tingling, color or temperature change to extremity/Swelling that gets worse/Wound/Surgical Site with redness, or foul smelling discharge or pus

## 2019-10-08 ENCOUNTER — APPOINTMENT (OUTPATIENT)
Dept: HEMATOLOGY ONCOLOGY | Facility: CLINIC | Age: 79
End: 2019-10-08
Payer: MEDICARE

## 2019-10-08 VITALS
SYSTOLIC BLOOD PRESSURE: 130 MMHG | BODY MASS INDEX: 22.78 KG/M2 | WEIGHT: 124.56 LBS | TEMPERATURE: 98.5 F | DIASTOLIC BLOOD PRESSURE: 73 MMHG | RESPIRATION RATE: 18 BRPM | HEART RATE: 93 BPM | OXYGEN SATURATION: 95 %

## 2019-10-08 PROCEDURE — 99214 OFFICE O/P EST MOD 30 MIN: CPT

## 2019-10-09 ENCOUNTER — APPOINTMENT (OUTPATIENT)
Dept: THORACIC SURGERY | Facility: CLINIC | Age: 79
End: 2019-10-09
Payer: MEDICARE

## 2019-10-09 VITALS
DIASTOLIC BLOOD PRESSURE: 69 MMHG | RESPIRATION RATE: 17 BRPM | HEART RATE: 83 BPM | HEIGHT: 62 IN | WEIGHT: 124 LBS | OXYGEN SATURATION: 96 % | TEMPERATURE: 98.1 F | BODY MASS INDEX: 22.82 KG/M2 | SYSTOLIC BLOOD PRESSURE: 122 MMHG

## 2019-10-09 PROCEDURE — 99214 OFFICE O/P EST MOD 30 MIN: CPT

## 2019-10-09 NOTE — PHYSICAL EXAM
[Sclera] : the sclera and conjunctiva were normal [Neck Appearance] : the appearance of the neck was normal [] : the neck was supple [Neck Cervical Mass (___cm)] : no neck mass was observed [Respiration, Rhythm And Depth] : normal respiratory rhythm and effort [Auscultation Breath Sounds / Voice Sounds] : lungs were clear to auscultation bilaterally [Heart Rate And Rhythm] : heart rate was normal and rhythm regular [Heart Sounds] : normal S1 and S2 [Examination Of The Chest] : the chest was normal in appearance [Abdomen Soft] : soft [Abdomen Tenderness] : non-tender [No CVA Tenderness] : no ~M costovertebral angle tenderness [Abnormal Walk] : normal gait [Skin Color & Pigmentation] : normal skin color and pigmentation [No Focal Deficits] : no focal deficits [Oriented To Time, Place, And Person] : oriented to person, place, and time [Impaired Insight] : insight and judgment were intact [Affect] : the affect was normal

## 2019-10-09 NOTE — REVIEW OF SYSTEMS
[Cough] : cough [SOB on Exertion] : shortness of breath during exertion [Negative] : Heme/Lymph [Shortness Of Breath] : no shortness of breath [Wheezing] : no wheezing

## 2019-10-09 NOTE — CONSULT LETTER
[Dear  ___] : Dear  [unfilled], [Please see my note below.] : Please see my note below. [Consult Letter:] : I had the pleasure of evaluating your patient, [unfilled]. [( Thank you for referring [unfilled] for consultation for _____ )] : Thank you for referring [unfilled] for consultation for [unfilled] [Consult Closing:] : Thank you very much for allowing me to participate in the care of this patient.  If you have any questions, please do not hesitate to contact me. [Sincerely,] : Sincerely, [DrJyotsna  ___] : Dr. NUÑEZ [DrJyotsna ___] : Dr. NUÑEZ [FreeTextEntry2] : Dominick Portillo MD (Referring)\maura Berkowitz MD (Hem/Onc) \maura Chase MD (Rad/Onc)  [FreeTextEntry3] : Cleve Liang MD, MPH \par System Director of Thoracic Surgery \par Director of Comprehensive Lung and Foregut [place] \par Professor Cardiovascular & Thoracic Surgery  \par [facility] [employer] [facility] at [facility]\par

## 2019-10-09 NOTE — ASSESSMENT
[FreeTextEntry1] : 80 y/o F, never smoker, w/ hx of asthma and clinical Stg III SCCA. \par \par Flex Bronch bx of TA bronchus, BAL, EBUS, core bx of the Lt paratracheal mass on 9/7/17. Path of Lt paratracheal mass showed atypical findings. TA core bx was non diagnostic.\par Now s/p Flex Broncho Cervical Mediastinoscopy on 9/19/17. Path revealed Metastatic Squamous cell CA to the Lt paratracheal mass; Lvl 4 and 7 LNs were negative for malignancy.\par \par Chemo/XRT with Dr. Jose Berkowitz and Dr. Chase, completed definitive chemo/XRT (60cGy in 30 fractions) in mid December 2017. Treated with maintenance Durvalumab x 1 year completed Feb 2019. \par \par PET/CT on 9/13/19:\par - diffusely increased FDG avidity within the enlarged thyroid gland, SUV=7.8\par - FDG avid focus right maxillary incisor, probably periodontal disease, SUV= 4.4\par - FDG avid soft tissue left paramediastinal region, measuring 3.0 cm, SUV=18.2, suspicious for recurrent disease\par - small left hilar focus, just posterior to the left mainstem bronchus, SUV=13.2 \par - post-radiation changes\par - stable small left pleural effusion\par \par Diagnostic Flex Bronch w/ BAL of TA bronchus, EBUS Bx of Lt hilar LN on 10/1/19. Path of Lt hilar LN was non diagnostic. Path of TA BAL negative for malignancy.\par \par I have reviewed the patient's medical records and diagnostic images at time of this office consultation and have made the following recommendation:\par 1.  I have recommended CT guided biopsy of 3 cm left paramediastinal soft tissue mass with Dr. Rocha.\par \par \par Written by Mary Jane Gaytan NP, acting as a scribe for Dr. Cleve Liang.\par \par The documentation recorded by the scribe accurately reflects the service I personally performed and the decisions made by me. CLEVE LIANG MD\par

## 2019-10-09 NOTE — DATA REVIEWED
[FreeTextEntry1] : PET/CT on 9/13/19:\par - diffusely increased FDG avidity within the enlarged thyroid gland, SUV=7.8\par - FDG avid focus right maxillary incisor, probably periodontal disease, SUV= 4.4\par - FDG avid soft tissue left paramediastinal region, measuring 3.0 cm, SUV=18.2, suspicious for recurrent disease\par - small left hilar focus, just posterior to the left mainstem bronchus, SUV=13.2 \par - post-radiation changes\par - stable small left pleural effusion\par \par Diagnostic Flex Bronch w/ BAL of TA bronchus, EBUS Bx of Lt hilar LN on 10/1/19. Path of Lt hilar LN was non diagnostic. Path of TA BAL negative for malignancy.

## 2019-10-09 NOTE — HISTORY OF PRESENT ILLNESS
[FreeTextEntry1] : 80 y/o F, never smoker, w/ hx of asthma and clinical Stg III SCCA. \par \par Chest CT from 8/30/17 revealed a 5.6 x 3.9 x 4.9 cm LEFT upper lobe mass extending into the TA, also the mediastinum, inseparable from the aortic arch & descending thoracic aorta. Also extending to the AP window region, with mild narrowing of the Left mainstem bronchus; inseparable from the hilum, with marked narrowing & possible occlusion of the Left main pulmonary artery. 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 Path of Lt paratracheal mass showed atypical findings. TA core bx was non diagnostic.\par Now s/p Flex Broncho Cervical Mediastinoscopy on 9/19/17. Path revealed Metastatic Squamous cell CA to the Lt paratracheal mass; Lvl 4 and 7 LNs were negative for malignancy.\par \par Chemo/XRT with Dr. Jose Berkowitz and Dr. Chase, completed definitive chemo/XRT (60cGy in 30 fractions) in mid December 2017. Treated with maintenance Durvalumab x 1 year completed Feb 2019. \par \par PET/CT on 9/13/19:\par - diffusely increased FDG avidity within the enlarged thyroid gland, SUV=7.8\par - FDG avid focus right maxillary incisor, probably periodontal disease, SUV= 4.4\par - FDG avid soft tissue left paramediastinal region, measuring 3.0 cm, SUV=18.2, suspicious for recurrent disease\par - small left hilar focus, just posterior to the left mainstem bronchus, SUV=13.2 \par - post-radiation changes\par - stable small left pleural effusion\par \par Diagnostic Flex Bronch w/ BAL of TA bronchus, EBUS Bx of Lt hilar LN on 10/1/19. Path of Lt hilar LN was non diagnostic. Path of TA BAL negative for malignancy.\par \par Patient is here today for a follow up.  She reports that she feels well, but she complains of shortness of breath with exertion and a cough which is occasionally productive of clear sputum.  \par

## 2019-10-10 ENCOUNTER — APPOINTMENT (OUTPATIENT)
Dept: OTOLARYNGOLOGY | Facility: CLINIC | Age: 79
End: 2019-10-10
Payer: MEDICARE

## 2019-10-10 VITALS — DIASTOLIC BLOOD PRESSURE: 66 MMHG | HEART RATE: 88 BPM | SYSTOLIC BLOOD PRESSURE: 110 MMHG

## 2019-10-10 DIAGNOSIS — J38.3 OTHER DISEASES OF VOCAL CORDS: ICD-10-CM

## 2019-10-10 PROCEDURE — 31579 LARYNGOSCOPY TELESCOPIC: CPT

## 2019-10-10 PROCEDURE — 99214 OFFICE O/P EST MOD 30 MIN: CPT | Mod: 25

## 2019-10-10 NOTE — HISTORY OF PRESENT ILLNESS
[de-identified] : 80yo F h/o UVFI, dysphonia and dysphagia, here for f/u injection laryngoplasty. Voice is better but gets tired when speaking. Cannot sing. Feels like she is getting worse.  Breathing well. Swallowing has been ok occasional choking when she is rushing but she tries to drink slowly. Initial injection Dec 2017, repeat April 2019. Vocal fatigue. Choking episodes are newer per patient. Happens when not careful with eating or drinking. Making some noises with sleeping, not snoring. Taking PPI once per day\par \par \par \par \par

## 2019-10-10 NOTE — CONSULT LETTER
[Dear  ___] : Dear  [unfilled], [Consult Letter:] : I had the pleasure of evaluating your patient, [unfilled]. [Please see my note below.] : Please see my note below. [Consult Closing:] : Thank you very much for allowing me to participate in the care of this patient.  If you have any questions, please do not hesitate to contact me. [Sincerely,] : Sincerely, [FreeTextEntry3] : Negrito Bauer MD, PhD\par Chief, Division of Laryngology\par Department of Otolaryngology\par [facility]\par Pediatric Otolaryngology, [facility]\par  of Otolaryngology\par [facility] [employer]\par \par \par

## 2019-10-17 ENCOUNTER — APPOINTMENT (OUTPATIENT)
Dept: INTERVENTIONAL RADIOLOGY/VASCULAR | Facility: CLINIC | Age: 79
End: 2019-10-17
Payer: MEDICARE

## 2019-10-17 VITALS
DIASTOLIC BLOOD PRESSURE: 66 MMHG | RESPIRATION RATE: 18 BRPM | WEIGHT: 126 LBS | HEART RATE: 89 BPM | OXYGEN SATURATION: 96 % | SYSTOLIC BLOOD PRESSURE: 109 MMHG | BODY MASS INDEX: 23.19 KG/M2 | HEIGHT: 62 IN

## 2019-10-17 DIAGNOSIS — J98.59 OTHER DISEASES OF MEDIASTINUM, NOT ELSEWHERE CLASSIFIED: ICD-10-CM

## 2019-10-17 DIAGNOSIS — R91.1 SOLITARY PULMONARY NODULE: ICD-10-CM

## 2019-10-17 PROCEDURE — 99205 OFFICE O/P NEW HI 60 MIN: CPT

## 2019-10-17 RX ORDER — FLUOCINONIDE 0.5 MG/G
0.05 CREAM TOPICAL TWICE DAILY
Qty: 1 | Refills: 3 | Status: COMPLETED | COMMUNITY
Start: 2018-07-24 | End: 2019-10-17

## 2019-10-17 RX ORDER — MAGNESIUM OXIDE/MAG AA CHELATE 300 MG
CAPSULE ORAL
Refills: 0 | Status: ACTIVE | COMMUNITY

## 2019-10-17 RX ORDER — RANITIDINE 300 MG/1
300 TABLET ORAL
Qty: 90 | Refills: 0 | Status: COMPLETED | COMMUNITY
Start: 2018-11-29 | End: 2019-10-17

## 2019-10-22 ENCOUNTER — FORM ENCOUNTER (OUTPATIENT)
Age: 79
End: 2019-10-22

## 2019-10-22 NOTE — DATA REVIEWED
[FreeTextEntry1] : PET/CT scan images reviewed and results discussed in great length with the patient

## 2019-10-22 NOTE — REVIEW OF SYSTEMS
[Fever] : no fever [Chills] : no chills [Eyesight Problems] : no eyesight problems [Loss Of Hearing] : no hearing loss [Palpitations] : no palpitations [Chest Pain] : no chest pain [Shortness Of Breath] : no shortness of breath [Easy Bleeding] : no tendency for easy bleeding [Easy Bruising] : no tendency for easy bruising

## 2019-10-22 NOTE — HISTORY OF PRESENT ILLNESS
[FreeTextEntry1] : 79 year old female presents to the office with her daughter who is a pediatrician, with history of asthma, lung cancer diagnosed in  2017After pt. reported having chronic nonproductive cough and reports having hoarseness and difficulty swallowing thin liquid and incidentally found to have left vocal cord paralysis.  Pt had CT scan done in 2017  noted to have TA mass extending into mediastinum, inseparable from the aortic arch & descending thoracic aorta. Also extending to the AP window region, with mild narrowing of the Left mainstem bronchus; inseparable from the hilum, with marked narrowing & possible occlusion of the Left main pulmonary artery. Pt had chemo/RT with Dr. Berkowitz and Dr. Chase. pt had immunotherapy for one year and finished February of this year. pt had bronchoscopy on 10/1/19  had biopsy done and reports "benign"Pt had recent PET/ CT done that revealed  “Difficult to delineate FDG avid mass left Para mediastinal region in the area of post treatment changes, suspicious for recurrent disease”\par \par Pt was evaluated by Dr. Liang and he referred and recommended pt to have left Para mediastinal soft tissue mass biopsy\par Pt denies having any recent fever, chills, n/v/d, cp or sob\par

## 2019-10-23 ENCOUNTER — RESULT REVIEW (OUTPATIENT)
Age: 79
End: 2019-10-23

## 2019-10-23 ENCOUNTER — OUTPATIENT (OUTPATIENT)
Dept: OUTPATIENT SERVICES | Facility: HOSPITAL | Age: 79
LOS: 1 days | End: 2019-10-23
Payer: MEDICARE

## 2019-10-23 DIAGNOSIS — R91.8 OTHER NONSPECIFIC ABNORMAL FINDING OF LUNG FIELD: ICD-10-CM

## 2019-10-23 DIAGNOSIS — Z98.890 OTHER SPECIFIED POSTPROCEDURAL STATES: Chronic | ICD-10-CM

## 2019-10-23 PROCEDURE — 32405: CPT

## 2019-10-23 PROCEDURE — 88360 TUMOR IMMUNOHISTOCHEM/MANUAL: CPT | Mod: 26

## 2019-10-23 PROCEDURE — 71045 X-RAY EXAM CHEST 1 VIEW: CPT | Mod: 26

## 2019-10-23 PROCEDURE — 88173 CYTOPATH EVAL FNA REPORT: CPT | Mod: 26

## 2019-10-23 PROCEDURE — 88305 TISSUE EXAM BY PATHOLOGIST: CPT | Mod: 26

## 2019-10-23 PROCEDURE — 77012 CT SCAN FOR NEEDLE BIOPSY: CPT | Mod: 26

## 2019-10-25 ENCOUNTER — OUTPATIENT (OUTPATIENT)
Dept: OUTPATIENT SERVICES | Facility: HOSPITAL | Age: 79
LOS: 1 days | Discharge: HOME | End: 2019-10-25

## 2019-10-25 DIAGNOSIS — C34.12 MALIGNANT NEOPLASM OF UPPER LOBE, LEFT BRONCHUS OR LUNG: ICD-10-CM

## 2019-10-25 DIAGNOSIS — Z98.890 OTHER SPECIFIED POSTPROCEDURAL STATES: Chronic | ICD-10-CM

## 2019-10-30 ENCOUNTER — APPOINTMENT (OUTPATIENT)
Dept: OTOLARYNGOLOGY | Facility: CLINIC | Age: 79
End: 2019-10-30
Payer: MEDICARE

## 2019-10-30 ENCOUNTER — APPOINTMENT (OUTPATIENT)
Dept: SPEECH THERAPY | Facility: CLINIC | Age: 79
End: 2019-10-30
Payer: MEDICARE

## 2019-10-30 ENCOUNTER — APPOINTMENT (OUTPATIENT)
Dept: HEMATOLOGY ONCOLOGY | Facility: CLINIC | Age: 79
End: 2019-10-30
Payer: MEDICARE

## 2019-10-30 VITALS
SYSTOLIC BLOOD PRESSURE: 107 MMHG | RESPIRATION RATE: 16 BRPM | HEART RATE: 89 BPM | WEIGHT: 124.78 LBS | TEMPERATURE: 98.5 F | BODY MASS INDEX: 22.82 KG/M2 | DIASTOLIC BLOOD PRESSURE: 60 MMHG | OXYGEN SATURATION: 98 %

## 2019-10-30 DIAGNOSIS — Z85.118 PERSONAL HISTORY OF OTHER MALIGNANT NEOPLASM OF BRONCHUS AND LUNG: ICD-10-CM

## 2019-10-30 DIAGNOSIS — R91.8 OTHER NONSPECIFIC ABNORMAL FINDING OF LUNG FIELD: ICD-10-CM

## 2019-10-30 PROCEDURE — 99214 OFFICE O/P EST MOD 30 MIN: CPT | Mod: 25

## 2019-10-30 PROCEDURE — 99215 OFFICE O/P EST HI 40 MIN: CPT

## 2019-10-30 PROCEDURE — 31575 DIAGNOSTIC LARYNGOSCOPY: CPT

## 2019-10-30 PROCEDURE — 92613 ENDOSCOPY SWALLOW (FEES) I&R: CPT | Mod: GN

## 2019-11-10 ENCOUNTER — FORM ENCOUNTER (OUTPATIENT)
Age: 79
End: 2019-11-10

## 2019-11-11 ENCOUNTER — OUTPATIENT (OUTPATIENT)
Dept: OUTPATIENT SERVICES | Facility: HOSPITAL | Age: 79
LOS: 1 days | End: 2019-11-11
Payer: MEDICARE

## 2019-11-11 ENCOUNTER — APPOINTMENT (OUTPATIENT)
Dept: MRI IMAGING | Facility: IMAGING CENTER | Age: 79
End: 2019-11-11
Payer: MEDICARE

## 2019-11-11 DIAGNOSIS — Z98.890 OTHER SPECIFIED POSTPROCEDURAL STATES: Chronic | ICD-10-CM

## 2019-11-11 DIAGNOSIS — C34.12 MALIGNANT NEOPLASM OF UPPER LOBE, LEFT BRONCHUS OR LUNG: ICD-10-CM

## 2019-11-11 PROCEDURE — 70553 MRI BRAIN STEM W/O & W/DYE: CPT | Mod: 26

## 2019-11-11 PROCEDURE — A9585: CPT

## 2019-11-11 PROCEDURE — 70553 MRI BRAIN STEM W/O & W/DYE: CPT

## 2019-11-12 ENCOUNTER — RESULT REVIEW (OUTPATIENT)
Age: 79
End: 2019-11-12

## 2019-11-12 ENCOUNTER — APPOINTMENT (OUTPATIENT)
Dept: HEMATOLOGY ONCOLOGY | Facility: CLINIC | Age: 79
End: 2019-11-12
Payer: MEDICARE

## 2019-11-12 VITALS
DIASTOLIC BLOOD PRESSURE: 72 MMHG | TEMPERATURE: 98.3 F | BODY MASS INDEX: 23.19 KG/M2 | OXYGEN SATURATION: 96 % | RESPIRATION RATE: 16 BRPM | SYSTOLIC BLOOD PRESSURE: 120 MMHG | HEART RATE: 78 BPM | WEIGHT: 126.77 LBS

## 2019-11-12 LAB
ALBUMIN SERPL ELPH-MCNC: 4.4 G/DL
ALP BLD-CCNC: 115 U/L
ALT SERPL-CCNC: 31 U/L
ANION GAP SERPL CALC-SCNC: 13 MMOL/L
AST SERPL-CCNC: 36 U/L
BASOPHILS # BLD AUTO: 0.1 K/UL — SIGNIFICANT CHANGE UP (ref 0–0.2)
BASOPHILS NFR BLD AUTO: 0.8 % — SIGNIFICANT CHANGE UP (ref 0–2)
BILIRUB SERPL-MCNC: 0.3 MG/DL
BUN SERPL-MCNC: 20 MG/DL
CALCIUM SERPL-MCNC: 9.2 MG/DL
CHLORIDE SERPL-SCNC: 106 MMOL/L
CO2 SERPL-SCNC: 19 MMOL/L
CREAT SERPL-MCNC: 1.12 MG/DL
EOSINOPHIL # BLD AUTO: 0.1 K/UL — SIGNIFICANT CHANGE UP (ref 0–0.5)
EOSINOPHIL NFR BLD AUTO: 1.4 % — SIGNIFICANT CHANGE UP (ref 0–6)
GLUCOSE SERPL-MCNC: 97 MG/DL
HCT VFR BLD CALC: 34.3 % — LOW (ref 34.5–45)
HGB BLD-MCNC: 11.8 G/DL — SIGNIFICANT CHANGE UP (ref 11.5–15.5)
LYMPHOCYTES # BLD AUTO: 1.5 K/UL — SIGNIFICANT CHANGE UP (ref 1–3.3)
LYMPHOCYTES # BLD AUTO: 17.3 % — SIGNIFICANT CHANGE UP (ref 13–44)
MAGNESIUM SERPL-MCNC: 2.5 MG/DL
MCHC RBC-ENTMCNC: 30.5 PG — SIGNIFICANT CHANGE UP (ref 27–34)
MCHC RBC-ENTMCNC: 34.5 G/DL — SIGNIFICANT CHANGE UP (ref 32–36)
MCV RBC AUTO: 88.4 FL — SIGNIFICANT CHANGE UP (ref 80–100)
MONOCYTES # BLD AUTO: 0.7 K/UL — SIGNIFICANT CHANGE UP (ref 0–0.9)
MONOCYTES NFR BLD AUTO: 8.6 % — SIGNIFICANT CHANGE UP (ref 2–14)
NEUTROPHILS # BLD AUTO: 6.1 K/UL — SIGNIFICANT CHANGE UP (ref 1.8–7.4)
NEUTROPHILS NFR BLD AUTO: 71.9 % — SIGNIFICANT CHANGE UP (ref 43–77)
PLATELET # BLD AUTO: 229 K/UL — SIGNIFICANT CHANGE UP (ref 150–400)
POTASSIUM SERPL-SCNC: 4 MMOL/L
PROT SERPL-MCNC: 7.6 G/DL
RBC # BLD: 3.88 M/UL — SIGNIFICANT CHANGE UP (ref 3.8–5.2)
RBC # FLD: 13.2 % — SIGNIFICANT CHANGE UP (ref 10.3–14.5)
SODIUM SERPL-SCNC: 138 MMOL/L
WBC # BLD: 8.5 K/UL — SIGNIFICANT CHANGE UP (ref 3.8–10.5)
WBC # FLD AUTO: 8.5 K/UL — SIGNIFICANT CHANGE UP (ref 3.8–10.5)

## 2019-11-12 PROCEDURE — 99215 OFFICE O/P EST HI 40 MIN: CPT

## 2019-11-14 ENCOUNTER — APPOINTMENT (OUTPATIENT)
Dept: RADIATION ONCOLOGY | Facility: CLINIC | Age: 79
End: 2019-11-14
Payer: MEDICARE

## 2019-11-14 VITALS
WEIGHT: 127.21 LBS | RESPIRATION RATE: 18 BRPM | SYSTOLIC BLOOD PRESSURE: 131 MMHG | OXYGEN SATURATION: 97 % | HEART RATE: 81 BPM | BODY MASS INDEX: 23.27 KG/M2 | DIASTOLIC BLOOD PRESSURE: 62 MMHG

## 2019-11-14 DIAGNOSIS — C34.90 MALIGNANT NEOPLASM OF UNSPECIFIED PART OF UNSPECIFIED BRONCHUS OR LUNG: ICD-10-CM

## 2019-11-14 PROCEDURE — 99213 OFFICE O/P EST LOW 20 MIN: CPT | Mod: GC

## 2019-11-14 NOTE — REVIEW OF SYSTEMS
[Cough] : cough [Constipation] : constipation [Negative] : Heme/Lymph [Constipation: Grade 0] : Constipation: Grade 0 [Diarrhea: Grade 0] : Diarrhea: Grade 0 [Dysphagia: Grade 0] : Dysphagia: Grade 0 [Nausea: Grade 1 - Loss of appetite without alteration in eating habits] : Nausea: Grade 1 - Loss of appetite without alteration in eating habits [Vomiting: Grade 0] : Vomiting: Grade 0 [Fatigue: Grade 1 - Fatigue relieved by rest] : Fatigue: Grade 1 - Fatigue relieved by rest [Dyspnea: Grade 1 - Shortness of breath with moderate exertion] : Dyspnea: Grade 1 - Shortness of breath with moderate exertion [Cough: Grade 1 - Mild symptoms; nonprescription intervention indicated] : Cough: Grade 1 - Mild symptoms; nonprescription intervention indicated [Dermatitis Radiation: Grade 0] : Dermatitis Radiation: Grade 0

## 2019-11-20 ENCOUNTER — APPOINTMENT (OUTPATIENT)
Dept: SPEECH THERAPY | Facility: CLINIC | Age: 79
End: 2019-11-20

## 2019-11-21 ENCOUNTER — OUTPATIENT (OUTPATIENT)
Dept: OUTPATIENT SERVICES | Facility: HOSPITAL | Age: 79
LOS: 1 days | Discharge: HOME | End: 2019-11-21

## 2019-11-21 DIAGNOSIS — Z98.890 OTHER SPECIFIED POSTPROCEDURAL STATES: Chronic | ICD-10-CM

## 2019-11-21 DIAGNOSIS — C34.12 MALIGNANT NEOPLASM OF UPPER LOBE, LEFT BRONCHUS OR LUNG: ICD-10-CM

## 2019-11-27 ENCOUNTER — RESULT REVIEW (OUTPATIENT)
Age: 79
End: 2019-11-27

## 2019-11-27 ENCOUNTER — APPOINTMENT (OUTPATIENT)
Dept: HEMATOLOGY ONCOLOGY | Facility: CLINIC | Age: 79
End: 2019-11-27
Payer: MEDICARE

## 2019-11-27 VITALS
BODY MASS INDEX: 22.78 KG/M2 | HEART RATE: 78 BPM | DIASTOLIC BLOOD PRESSURE: 72 MMHG | TEMPERATURE: 98.2 F | OXYGEN SATURATION: 98 % | RESPIRATION RATE: 18 BRPM | SYSTOLIC BLOOD PRESSURE: 120 MMHG | WEIGHT: 124.56 LBS

## 2019-11-27 LAB
ALBUMIN SERPL ELPH-MCNC: 4.4 G/DL
ALP BLD-CCNC: 129 U/L
ALT SERPL-CCNC: 40 U/L
ANION GAP SERPL CALC-SCNC: 13 MMOL/L
AST SERPL-CCNC: 39 U/L
BASOPHILS # BLD AUTO: 0 K/UL — SIGNIFICANT CHANGE UP (ref 0–0.2)
BASOPHILS NFR BLD AUTO: 0.8 % — SIGNIFICANT CHANGE UP (ref 0–2)
BILIRUB SERPL-MCNC: 0.3 MG/DL
BUN SERPL-MCNC: 21 MG/DL
CALCIUM SERPL-MCNC: 9 MG/DL
CHLORIDE SERPL-SCNC: 104 MMOL/L
CO2 SERPL-SCNC: 22 MMOL/L
CREAT SERPL-MCNC: 1.15 MG/DL
EOSINOPHIL # BLD AUTO: 0.1 K/UL — SIGNIFICANT CHANGE UP (ref 0–0.5)
EOSINOPHIL NFR BLD AUTO: 1.7 % — SIGNIFICANT CHANGE UP (ref 0–6)
GLUCOSE SERPL-MCNC: 95 MG/DL
HCT VFR BLD CALC: 35.3 % — SIGNIFICANT CHANGE UP (ref 34.5–45)
HGB BLD-MCNC: 11.4 G/DL — LOW (ref 11.5–15.5)
LYMPHOCYTES # BLD AUTO: 1.6 K/UL — SIGNIFICANT CHANGE UP (ref 1–3.3)
LYMPHOCYTES # BLD AUTO: 26.2 % — SIGNIFICANT CHANGE UP (ref 13–44)
MAGNESIUM SERPL-MCNC: 2.6 MG/DL
MCHC RBC-ENTMCNC: 28.8 PG — SIGNIFICANT CHANGE UP (ref 27–34)
MCHC RBC-ENTMCNC: 32.4 G/DL — SIGNIFICANT CHANGE UP (ref 32–36)
MCV RBC AUTO: 88.9 FL — SIGNIFICANT CHANGE UP (ref 80–100)
MONOCYTES # BLD AUTO: 0.8 K/UL — SIGNIFICANT CHANGE UP (ref 0–0.9)
MONOCYTES NFR BLD AUTO: 13.1 % — SIGNIFICANT CHANGE UP (ref 2–14)
NEUTROPHILS # BLD AUTO: 3.4 K/UL — SIGNIFICANT CHANGE UP (ref 1.8–7.4)
NEUTROPHILS NFR BLD AUTO: 58.2 % — SIGNIFICANT CHANGE UP (ref 43–77)
PLATELET # BLD AUTO: 194 K/UL — SIGNIFICANT CHANGE UP (ref 150–400)
POTASSIUM SERPL-SCNC: 4.1 MMOL/L
PROT SERPL-MCNC: 7.6 G/DL
RBC # BLD: 3.97 M/UL — SIGNIFICANT CHANGE UP (ref 3.8–5.2)
RBC # FLD: 13.7 % — SIGNIFICANT CHANGE UP (ref 10.3–14.5)
SODIUM SERPL-SCNC: 139 MMOL/L
WBC # BLD: 5.9 K/UL — SIGNIFICANT CHANGE UP (ref 3.8–10.5)
WBC # FLD AUTO: 5.9 K/UL — SIGNIFICANT CHANGE UP (ref 3.8–10.5)

## 2019-11-27 PROCEDURE — 99214 OFFICE O/P EST MOD 30 MIN: CPT

## 2019-12-15 NOTE — DISEASE MANAGEMENT
[Clinical] : TNM Stage: c [IIIB] : IIIB [FreeTextEntry4] : now recurrent [TTNM] : 4 [MTNM] : 0 [NTNM] : 2

## 2019-12-15 NOTE — HISTORY OF PRESENT ILLNESS
[FreeTextEntry1] : Ms. 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  Her dyspnea on exertion had then improved and she had minimal cough and dysgeusia. No dysphagia.\par \par CT on 9/9/19 noted interval increase in size of TA fullness superimposed on radiation therapy changes. A new 2 mm LLL pulmonary nodule was noted with PET recommended.\par \par PET on 9/13/19 noted FDG avid left paramediastinal soft tissue difficult to delineate measuring 3.0 cm with SUV 18.2 and separate left hilar focus posterior to left mainstem bronchus with SUV 13.2 probably metastatic lymph node.\par \par On 10/1/19 she underwent flex bronch and EBUS biopsy of left hilar node.  Pathology of the left hilar lymph node was non-diagnostic and TA BAL path demonstrated atypical findings.  \par \par She saw Dr. Liang on 10/9/19 with plans for biopsy of left paramediastinal left soft tissue mass.  She met Dr. Rocha of IR regarding this on 10/17/19.\par \par Biopsy done on 10/23/19 positive for squamous cell carcinoma.\par \par She most recently followed up with Dr. 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  Plan for restaging in 2 months after therapy.\par \par She started crizotinib on 11/1.\par \par MRI on 11/11/19 noted an area of post-contrast T1 high signal without corresponding area of abnormal signal seen on other sequences suggesting that it is more likely artifact.\par \par She followed up with Dr. Berkowitz on 11/12 who followed up with her on MRI results on 11/13 stating it was negative and the artifact can be revaluated on scan in January.  She also reported constipation and was started on senna.\par \par She presents for follow-up.No complaints of pain noted. Daughter states lack of appetite but has maintained weight. Dry cough durinthe cold weather and occ SOB noted.  Some constipation on miralax.

## 2019-12-26 NOTE — HISTORY OF PRESENT ILLNESS
[de-identified] : 80yo F h/o UVFI, dysphonia and dysphagia. Voice is stable. Still complains of intermittent aspiration symptoms, will be getting FEES. Breathing well. Swallowing has been ok occasional choking when she is rushing but she tries to drink slowly. Initial injection Dec 2017, repeat April 2019. Vocal fatigue. Choking episodes are newer per patient. Happens when not careful with eating or drinking. Making some noises with sleeping, not snoring. Taking PPI, is helping.\par \par \par \par \par \par

## 2019-12-28 ENCOUNTER — OUTPATIENT (OUTPATIENT)
Dept: OUTPATIENT SERVICES | Facility: HOSPITAL | Age: 79
LOS: 1 days | Discharge: HOME | End: 2019-12-28

## 2019-12-28 DIAGNOSIS — Z98.890 OTHER SPECIFIED POSTPROCEDURAL STATES: Chronic | ICD-10-CM

## 2019-12-28 DIAGNOSIS — C34.12 MALIGNANT NEOPLASM OF UPPER LOBE, LEFT BRONCHUS OR LUNG: ICD-10-CM

## 2019-12-29 ENCOUNTER — FORM ENCOUNTER (OUTPATIENT)
Age: 79
End: 2019-12-29

## 2019-12-30 ENCOUNTER — APPOINTMENT (OUTPATIENT)
Dept: CT IMAGING | Facility: IMAGING CENTER | Age: 79
End: 2019-12-30
Payer: MEDICARE

## 2019-12-30 ENCOUNTER — OUTPATIENT (OUTPATIENT)
Dept: OUTPATIENT SERVICES | Facility: HOSPITAL | Age: 79
LOS: 1 days | End: 2019-12-30
Payer: MEDICARE

## 2019-12-30 DIAGNOSIS — C34.12 MALIGNANT NEOPLASM OF UPPER LOBE, LEFT BRONCHUS OR LUNG: ICD-10-CM

## 2019-12-30 DIAGNOSIS — Z98.890 OTHER SPECIFIED POSTPROCEDURAL STATES: Chronic | ICD-10-CM

## 2019-12-30 PROCEDURE — 71250 CT THORAX DX C-: CPT

## 2019-12-30 PROCEDURE — 71250 CT THORAX DX C-: CPT | Mod: 26

## 2020-01-01 ENCOUNTER — RESULT REVIEW (OUTPATIENT)
Age: 80
End: 2020-01-01

## 2020-01-01 ENCOUNTER — APPOINTMENT (OUTPATIENT)
Dept: DISASTER EMERGENCY | Facility: CLINIC | Age: 80
End: 2020-01-01

## 2020-01-01 ENCOUNTER — TRANSCRIPTION ENCOUNTER (OUTPATIENT)
Age: 80
End: 2020-01-01

## 2020-01-01 ENCOUNTER — APPOINTMENT (OUTPATIENT)
Dept: OTOLARYNGOLOGY | Facility: HOSPITAL | Age: 80
End: 2020-01-01

## 2020-01-01 ENCOUNTER — OUTPATIENT (OUTPATIENT)
Dept: OUTPATIENT SERVICES | Facility: HOSPITAL | Age: 80
LOS: 1 days | Discharge: HOME | End: 2020-01-01

## 2020-01-01 ENCOUNTER — APPOINTMENT (OUTPATIENT)
Dept: HEMATOLOGY ONCOLOGY | Facility: CLINIC | Age: 80
End: 2020-01-01
Payer: MEDICARE

## 2020-01-01 ENCOUNTER — LABORATORY RESULT (OUTPATIENT)
Age: 80
End: 2020-01-01

## 2020-01-01 ENCOUNTER — INPATIENT (INPATIENT)
Facility: HOSPITAL | Age: 80
LOS: 0 days | Discharge: HOME | End: 2020-12-15
Attending: OTOLARYNGOLOGY | Admitting: OTOLARYNGOLOGY
Payer: MEDICARE

## 2020-01-01 VITALS
OXYGEN SATURATION: 99 % | SYSTOLIC BLOOD PRESSURE: 124 MMHG | RESPIRATION RATE: 14 BRPM | HEIGHT: 62.5 IN | TEMPERATURE: 99 F | WEIGHT: 132.06 LBS | DIASTOLIC BLOOD PRESSURE: 61 MMHG | HEART RATE: 89 BPM

## 2020-01-01 VITALS
SYSTOLIC BLOOD PRESSURE: 117 MMHG | TEMPERATURE: 98 F | HEART RATE: 89 BPM | OXYGEN SATURATION: 100 % | DIASTOLIC BLOOD PRESSURE: 73 MMHG | RESPIRATION RATE: 18 BRPM

## 2020-01-01 DIAGNOSIS — Z01.818 ENCOUNTER FOR OTHER PREPROCEDURAL EXAMINATION: ICD-10-CM

## 2020-01-01 DIAGNOSIS — Z98.890 OTHER SPECIFIED POSTPROCEDURAL STATES: Chronic | ICD-10-CM

## 2020-01-01 DIAGNOSIS — J38.01 PARALYSIS OF VOCAL CORDS AND LARYNX, UNILATERAL: ICD-10-CM

## 2020-01-01 DIAGNOSIS — Z87.09 PERSONAL HISTORY OF OTHER DISEASES OF THE RESPIRATORY SYSTEM: Chronic | ICD-10-CM

## 2020-01-01 DIAGNOSIS — C34.12 MALIGNANT NEOPLASM OF UPPER LOBE, LEFT BRONCHUS OR LUNG: ICD-10-CM

## 2020-01-01 LAB
SARS-COV-2 N GENE NPH QL NAA+PROBE: NOT DETECTED
SURGICAL PATHOLOGY STUDY: SIGNIFICANT CHANGE UP

## 2020-01-01 PROCEDURE — 31591 LARYNGOPLASTY MEDIALIZATION: CPT

## 2020-01-01 PROCEDURE — 88305 TISSUE EXAM BY PATHOLOGIST: CPT | Mod: 26

## 2020-01-01 PROCEDURE — 31575 DIAGNOSTIC LARYNGOSCOPY: CPT | Mod: 59

## 2020-01-01 PROCEDURE — 42950 RECONSTRUCTION OF THROAT: CPT

## 2020-01-01 PROCEDURE — 99214 OFFICE O/P EST MOD 30 MIN: CPT | Mod: 95

## 2020-01-01 RX ORDER — ONDANSETRON 8 MG/1
4 TABLET, FILM COATED ORAL ONCE
Refills: 0 | Status: DISCONTINUED | OUTPATIENT
Start: 2020-01-01 | End: 2020-01-01

## 2020-01-01 RX ORDER — DEXAMETHASONE 0.5 MG/5ML
10 ELIXIR ORAL ONCE
Refills: 0 | Status: COMPLETED | OUTPATIENT
Start: 2020-01-01 | End: 2020-01-01

## 2020-01-01 RX ORDER — ACETAMINOPHEN 500 MG
2 TABLET ORAL
Qty: 0 | Refills: 0 | DISCHARGE

## 2020-01-01 RX ORDER — HEPARIN SODIUM 5000 [USP'U]/ML
5000 INJECTION INTRAVENOUS; SUBCUTANEOUS EVERY 12 HOURS
Refills: 0 | Status: DISCONTINUED | OUTPATIENT
Start: 2020-01-01 | End: 2020-01-01

## 2020-01-01 RX ORDER — OXYCODONE AND ACETAMINOPHEN 5; 325 MG/1; MG/1
2 TABLET ORAL EVERY 6 HOURS
Refills: 0 | Status: DISCONTINUED | OUTPATIENT
Start: 2020-01-01 | End: 2020-01-01

## 2020-01-01 RX ORDER — FENTANYL CITRATE 50 UG/ML
25 INJECTION INTRAVENOUS
Refills: 0 | Status: DISCONTINUED | OUTPATIENT
Start: 2020-01-01 | End: 2020-01-01

## 2020-01-01 RX ORDER — OXYCODONE AND ACETAMINOPHEN 5; 325 MG/1; MG/1
1 TABLET ORAL EVERY 6 HOURS
Refills: 0 | Status: DISCONTINUED | OUTPATIENT
Start: 2020-01-01 | End: 2020-01-01

## 2020-01-01 RX ORDER — AMLODIPINE BESYLATE 2.5 MG/1
10 TABLET ORAL DAILY
Refills: 0 | Status: DISCONTINUED | OUTPATIENT
Start: 2020-01-01 | End: 2020-01-01

## 2020-01-01 RX ORDER — FAMOTIDINE 10 MG/ML
20 INJECTION INTRAVENOUS DAILY
Refills: 0 | Status: DISCONTINUED | OUTPATIENT
Start: 2020-01-01 | End: 2020-01-01

## 2020-01-01 RX ORDER — ACETAMINOPHEN 500 MG
650 TABLET ORAL EVERY 6 HOURS
Refills: 0 | Status: DISCONTINUED | OUTPATIENT
Start: 2020-01-01 | End: 2020-01-01

## 2020-01-01 RX ORDER — LEVOTHYROXINE SODIUM 125 MCG
75 TABLET ORAL DAILY
Refills: 0 | Status: DISCONTINUED | OUTPATIENT
Start: 2020-01-01 | End: 2020-01-01

## 2020-01-01 RX ORDER — FAMOTIDINE 10 MG/ML
20 INJECTION INTRAVENOUS ONCE
Refills: 0 | Status: COMPLETED | OUTPATIENT
Start: 2020-01-01 | End: 2020-01-01

## 2020-01-01 RX ORDER — ACETAMINOPHEN 500 MG
1000 TABLET ORAL ONCE
Refills: 0 | Status: COMPLETED | OUTPATIENT
Start: 2020-01-01 | End: 2020-01-01

## 2020-01-01 RX ADMIN — AMLODIPINE BESYLATE 10 MILLIGRAM(S): 2.5 TABLET ORAL at 04:45

## 2020-01-01 RX ADMIN — FENTANYL CITRATE 25 MICROGRAM(S): 50 INJECTION INTRAVENOUS at 17:47

## 2020-01-01 RX ADMIN — SODIUM CHLORIDE 30 MILLILITER(S): 9 INJECTION, SOLUTION INTRAVENOUS at 04:46

## 2020-01-01 RX ADMIN — Medication 1000 MILLIGRAM(S): at 21:02

## 2020-01-01 RX ADMIN — HEPARIN SODIUM 5000 UNIT(S): 5000 INJECTION INTRAVENOUS; SUBCUTANEOUS at 21:40

## 2020-01-01 RX ADMIN — Medication 75 MICROGRAM(S): at 04:45

## 2020-01-01 RX ADMIN — Medication 400 MILLIGRAM(S): at 20:27

## 2020-01-01 RX ADMIN — FENTANYL CITRATE 25 MICROGRAM(S): 50 INJECTION INTRAVENOUS at 17:01

## 2020-01-01 RX ADMIN — FENTANYL CITRATE 25 MICROGRAM(S): 50 INJECTION INTRAVENOUS at 17:30

## 2020-01-01 RX ADMIN — Medication 102 MILLIGRAM(S): at 13:11

## 2020-01-01 RX ADMIN — FAMOTIDINE 20 MILLIGRAM(S): 10 INJECTION INTRAVENOUS at 21:41

## 2020-01-01 RX ADMIN — OXYCODONE AND ACETAMINOPHEN 1 TABLET(S): 5; 325 TABLET ORAL at 01:03

## 2020-01-01 RX ADMIN — SODIUM CHLORIDE 30 MILLILITER(S): 9 INJECTION, SOLUTION INTRAVENOUS at 13:12

## 2020-01-01 RX ADMIN — OXYCODONE AND ACETAMINOPHEN 1 TABLET(S): 5; 325 TABLET ORAL at 02:24

## 2020-01-01 RX ADMIN — SODIUM CHLORIDE 30 MILLILITER(S): 9 INJECTION, SOLUTION INTRAVENOUS at 19:12

## 2020-01-01 RX ADMIN — Medication 102 MILLIGRAM(S): at 19:12

## 2020-01-03 ENCOUNTER — RESULT REVIEW (OUTPATIENT)
Age: 80
End: 2020-01-03

## 2020-01-03 ENCOUNTER — APPOINTMENT (OUTPATIENT)
Dept: HEMATOLOGY ONCOLOGY | Facility: CLINIC | Age: 80
End: 2020-01-03
Payer: MEDICARE

## 2020-01-03 VITALS
OXYGEN SATURATION: 97 % | DIASTOLIC BLOOD PRESSURE: 60 MMHG | RESPIRATION RATE: 20 BRPM | TEMPERATURE: 98.4 F | WEIGHT: 126.52 LBS | SYSTOLIC BLOOD PRESSURE: 111 MMHG | BODY MASS INDEX: 23.14 KG/M2 | HEART RATE: 73 BPM

## 2020-01-03 LAB
BASOPHILS # BLD AUTO: 0 K/UL — SIGNIFICANT CHANGE UP (ref 0–0.2)
BASOPHILS NFR BLD AUTO: 0.5 % — SIGNIFICANT CHANGE UP (ref 0–2)
EOSINOPHIL # BLD AUTO: 0 K/UL — SIGNIFICANT CHANGE UP (ref 0–0.5)
EOSINOPHIL NFR BLD AUTO: 1.2 % — SIGNIFICANT CHANGE UP (ref 0–6)
HCT VFR BLD CALC: 30.4 % — LOW (ref 34.5–45)
HGB BLD-MCNC: 10 G/DL — LOW (ref 11.5–15.5)
LYMPHOCYTES # BLD AUTO: 1 K/UL — SIGNIFICANT CHANGE UP (ref 1–3.3)
LYMPHOCYTES # BLD AUTO: 28.6 % — SIGNIFICANT CHANGE UP (ref 13–44)
MCHC RBC-ENTMCNC: 30.3 PG — SIGNIFICANT CHANGE UP (ref 27–34)
MCHC RBC-ENTMCNC: 33.3 G/DL — SIGNIFICANT CHANGE UP (ref 32–36)
MCV RBC AUTO: 91.1 FL — SIGNIFICANT CHANGE UP (ref 80–100)
MONOCYTES # BLD AUTO: 0.5 K/UL — SIGNIFICANT CHANGE UP (ref 0–0.9)
MONOCYTES NFR BLD AUTO: 13.9 % — SIGNIFICANT CHANGE UP (ref 2–14)
NEUTROPHILS # BLD AUTO: 2 K/UL — SIGNIFICANT CHANGE UP (ref 1.8–7.4)
NEUTROPHILS NFR BLD AUTO: 55.8 % — SIGNIFICANT CHANGE UP (ref 43–77)
PLATELET # BLD AUTO: 204 K/UL — SIGNIFICANT CHANGE UP (ref 150–400)
RBC # BLD: 3.5 M/UL — LOW (ref 3.8–5.2)
RBC # FLD: 15.1 % — HIGH (ref 10.3–14.5)
WBC # BLD: 5.2 K/UL — SIGNIFICANT CHANGE UP (ref 3.8–10.5)
WBC # FLD AUTO: 5.2 K/UL — SIGNIFICANT CHANGE UP (ref 3.8–10.5)

## 2020-01-03 PROCEDURE — 99215 OFFICE O/P EST HI 40 MIN: CPT

## 2020-01-06 ENCOUNTER — FORM ENCOUNTER (OUTPATIENT)
Age: 80
End: 2020-01-06

## 2020-01-06 LAB
ALBUMIN SERPL ELPH-MCNC: 4.2 G/DL
ALP BLD-CCNC: 135 U/L
ALT SERPL-CCNC: 49 U/L
ANION GAP SERPL CALC-SCNC: 11 MMOL/L
AST SERPL-CCNC: 47 U/L
BILIRUB SERPL-MCNC: 0.2 MG/DL
BUN SERPL-MCNC: 22 MG/DL
CALCIUM SERPL-MCNC: 8.7 MG/DL
CHLORIDE SERPL-SCNC: 108 MMOL/L
CO2 SERPL-SCNC: 22 MMOL/L
CREAT SERPL-MCNC: 1.24 MG/DL
GLUCOSE SERPL-MCNC: 139 MG/DL
MAGNESIUM SERPL-MCNC: 2.7 MG/DL
POTASSIUM SERPL-SCNC: 4.3 MMOL/L
PROT SERPL-MCNC: 7.2 G/DL
SODIUM SERPL-SCNC: 141 MMOL/L

## 2020-01-07 ENCOUNTER — APPOINTMENT (OUTPATIENT)
Dept: NUCLEAR MEDICINE | Facility: IMAGING CENTER | Age: 80
End: 2020-01-07
Payer: MEDICARE

## 2020-01-07 ENCOUNTER — OUTPATIENT (OUTPATIENT)
Dept: OUTPATIENT SERVICES | Facility: HOSPITAL | Age: 80
LOS: 1 days | End: 2020-01-07

## 2020-01-07 DIAGNOSIS — Z98.890 OTHER SPECIFIED POSTPROCEDURAL STATES: Chronic | ICD-10-CM

## 2020-01-07 DIAGNOSIS — C34.12 MALIGNANT NEOPLASM OF UPPER LOBE, LEFT BRONCHUS OR LUNG: ICD-10-CM

## 2020-01-07 PROCEDURE — 78815 PET IMAGE W/CT SKULL-THIGH: CPT | Mod: 26,PS

## 2020-01-22 NOTE — ASU PREOP CHECKLIST - WEIGHT IN KG
[facility]      Seen earlier today, and examined.  - Today, patient is without complaints.           *****MEDICATIONS: Current medication reviewed and documented.    MEDICATIONS  (STANDING):  ALPRAZolam 0.125 milliGRAM(s) Oral three times a day  amLODIPine   Tablet 5 milliGRAM(s) Oral daily  diVALproex  milliGRAM(s) Oral every 12 hours  folic acid 1 milliGRAM(s) Oral daily  hydrALAZINE 50 milliGRAM(s) Oral three times a day  lamoTRIgine 100 milliGRAM(s) Oral two times a day  latanoprost 0.005% Ophthalmic Solution 1 Drop(s) Both EYES at bedtime  QUEtiapine 25 milliGRAM(s) Oral daily  QUEtiapine 50 milliGRAM(s) Oral at bedtime    MEDICATIONS  (PRN):          ***** VITAL SIGNS:  T(F): 98.1 (20 @ 12:59), Max: 98.1 (20 @ 12:59)  HR: 69 (20 @ 12:59) (69 - 69)  BP: 109/68 (20 @ 12:59) (109/68 - 109/68)  RR: 17 (20 @ 12:59) (17 - 17)  SpO2: 92% (20 @ 12:59) (92% - 92%)  Wt(kg): --  ,   I&O's Summary    2020 07:  -  2020 07:00  --------------------------------------------------------  IN: 360 mL / OUT: 0 mL / NET: 360 mL    2020 07:  -  2020 06:14  --------------------------------------------------------  IN: 240 mL / OUT: 0 mL / NET: 240 mL             *****PHYSICAL EXAM: Alert   more cooperative today.   repeatedly states " I want to go back.   Send me back. " can follow simple commands    not fully cooperative with exam.    tracks blinks to threat   No facial asymmetry   Tongue is midline      Moving all 4 ext symmetrically antigravity        *****LAB AND IMAGIN-22    140  |  106  |  15  ----------------------------<  88  4.3   |  21<L>  |  0.68    Ca    8.4      2020 06:55                           [All pertinent recent Imaging/Reports reviewed]           *****A S S E S S M E N T   A N D   P L A N :   69 yo M PMHx of TBI,  shunt placed 2017, presents with AMS. Pt was transferred from [place] s/p CT showed worsening hydrocephalus. Pt was taken to [place] for AMS which was noticed by NH staff around 12. He is a poor historian and gets agitated when trying to obtain HPI.     Spoke to hcp/Legal guardian  Anel ANTOINE who reports that pt has had progressive memory problems for some years  with poor short term memory. He is also very impulsive, agitated at times. He has been in and out of various housing situations, currently living at the [facility]. They sent him to [facility] of hypotension, due to poor po intake.   She was not concerned about his mental status at this time.        Problem/Recommendations 1: Agitation at baseline per HCP  head ct reviewed  neurosurgical input appreciated,  not concerned about shunt failure, no intervention planned.   based on my evaluation it appears that pt is at baseline   pt eval    no further w/u anticipated.       Problem/Recommendations 2:  blood culture + / ID believes its a contaminant   no abx per ID           Thank you for allowing me to participate in the care of this patient. Please do not hesitate to call me if you have any  questions.        ________________  Emeli Lilly MD  [facility] ([facility]  [phone]      33 minutes spent on total encounter; more than 50 % of the visit was  spent counseling about plan of care, compliance to diet/exercise and medication regimen and or  coordinating care by the attending physician.      It is advised that stroke patients follow up with KELSEY Zavala @ [phone]  [phone] in 1- 2 weeks.   Others please follow up with Dr. Michael Nissenbaum [phone] 57.6

## 2020-01-31 ENCOUNTER — FORM ENCOUNTER (OUTPATIENT)
Age: 80
End: 2020-01-31

## 2020-02-01 ENCOUNTER — OUTPATIENT (OUTPATIENT)
Dept: OUTPATIENT SERVICES | Facility: HOSPITAL | Age: 80
LOS: 1 days | End: 2020-02-01
Payer: MEDICARE

## 2020-02-01 ENCOUNTER — APPOINTMENT (OUTPATIENT)
Dept: MRI IMAGING | Facility: IMAGING CENTER | Age: 80
End: 2020-02-01
Payer: MEDICARE

## 2020-02-01 ENCOUNTER — OUTPATIENT (OUTPATIENT)
Dept: OUTPATIENT SERVICES | Facility: HOSPITAL | Age: 80
LOS: 1 days | Discharge: HOME | End: 2020-02-01

## 2020-02-01 DIAGNOSIS — Z98.890 OTHER SPECIFIED POSTPROCEDURAL STATES: Chronic | ICD-10-CM

## 2020-02-01 DIAGNOSIS — C34.12 MALIGNANT NEOPLASM OF UPPER LOBE, LEFT BRONCHUS OR LUNG: ICD-10-CM

## 2020-02-01 PROCEDURE — A9585: CPT

## 2020-02-01 PROCEDURE — 70553 MRI BRAIN STEM W/O & W/DYE: CPT

## 2020-02-01 PROCEDURE — 70553 MRI BRAIN STEM W/O & W/DYE: CPT | Mod: 26

## 2020-02-07 ENCOUNTER — RESULT REVIEW (OUTPATIENT)
Age: 80
End: 2020-02-07

## 2020-02-07 ENCOUNTER — APPOINTMENT (OUTPATIENT)
Dept: HEMATOLOGY ONCOLOGY | Facility: CLINIC | Age: 80
End: 2020-02-07
Payer: MEDICARE

## 2020-02-07 VITALS
SYSTOLIC BLOOD PRESSURE: 100 MMHG | TEMPERATURE: 97.7 F | DIASTOLIC BLOOD PRESSURE: 59 MMHG | WEIGHT: 121.25 LBS | BODY MASS INDEX: 22.18 KG/M2 | OXYGEN SATURATION: 96 % | HEART RATE: 71 BPM | RESPIRATION RATE: 16 BRPM

## 2020-02-07 LAB
BASOPHILS # BLD AUTO: 0.1 K/UL — SIGNIFICANT CHANGE UP (ref 0–0.2)
BASOPHILS NFR BLD AUTO: 1.3 % — SIGNIFICANT CHANGE UP (ref 0–2)
EOSINOPHIL # BLD AUTO: 0.1 K/UL — SIGNIFICANT CHANGE UP (ref 0–0.5)
EOSINOPHIL NFR BLD AUTO: 0.8 % — SIGNIFICANT CHANGE UP (ref 0–6)
HCT VFR BLD CALC: 33.4 % — LOW (ref 34.5–45)
HGB BLD-MCNC: 10.6 G/DL — LOW (ref 11.5–15.5)
LYMPHOCYTES # BLD AUTO: 1.7 K/UL — SIGNIFICANT CHANGE UP (ref 1–3.3)
LYMPHOCYTES # BLD AUTO: 27.4 % — SIGNIFICANT CHANGE UP (ref 13–44)
MCHC RBC-ENTMCNC: 30.1 PG — SIGNIFICANT CHANGE UP (ref 27–34)
MCHC RBC-ENTMCNC: 31.8 G/DL — LOW (ref 32–36)
MCV RBC AUTO: 94.5 FL — SIGNIFICANT CHANGE UP (ref 80–100)
MONOCYTES # BLD AUTO: 0.5 K/UL — SIGNIFICANT CHANGE UP (ref 0–0.9)
MONOCYTES NFR BLD AUTO: 8.1 % — SIGNIFICANT CHANGE UP (ref 2–14)
NEUTROPHILS # BLD AUTO: 3.8 K/UL — SIGNIFICANT CHANGE UP (ref 1.8–7.4)
NEUTROPHILS NFR BLD AUTO: 62.3 % — SIGNIFICANT CHANGE UP (ref 43–77)
PLATELET # BLD AUTO: 199 K/UL — SIGNIFICANT CHANGE UP (ref 150–400)
RBC # BLD: 3.54 M/UL — LOW (ref 3.8–5.2)
RBC # FLD: 14.8 % — HIGH (ref 10.3–14.5)
WBC # BLD: 6.2 K/UL — SIGNIFICANT CHANGE UP (ref 3.8–10.5)
WBC # FLD AUTO: 6.2 K/UL — SIGNIFICANT CHANGE UP (ref 3.8–10.5)

## 2020-02-07 PROCEDURE — 99215 OFFICE O/P EST HI 40 MIN: CPT

## 2020-02-10 LAB
ALBUMIN SERPL ELPH-MCNC: 4.3 G/DL
ALP BLD-CCNC: 142 U/L
ALT SERPL-CCNC: 54 U/L
ANION GAP SERPL CALC-SCNC: 12 MMOL/L
AST SERPL-CCNC: 47 U/L
BILIRUB SERPL-MCNC: 0.3 MG/DL
BUN SERPL-MCNC: 26 MG/DL
CALCIUM SERPL-MCNC: 8.8 MG/DL
CHLORIDE SERPL-SCNC: 108 MMOL/L
CO2 SERPL-SCNC: 21 MMOL/L
CREAT SERPL-MCNC: 1.2 MG/DL
FERRITIN SERPL-MCNC: 313 NG/ML
FOLATE SERPL-MCNC: >20 NG/ML
GLUCOSE SERPL-MCNC: 92 MG/DL
IRON SATN MFR SERPL: 30 %
IRON SERPL-MCNC: 78 UG/DL
MAGNESIUM SERPL-MCNC: 2.9 MG/DL
POTASSIUM SERPL-SCNC: 4.5 MMOL/L
PROT SERPL-MCNC: 7.5 G/DL
SODIUM SERPL-SCNC: 141 MMOL/L
TIBC SERPL-MCNC: 259 UG/DL
UIBC SERPL-MCNC: 181 UG/DL
VIT B12 SERPL-MCNC: 881 PG/ML

## 2020-03-10 ENCOUNTER — OUTPATIENT (OUTPATIENT)
Dept: OUTPATIENT SERVICES | Facility: HOSPITAL | Age: 80
LOS: 1 days | Discharge: HOME | End: 2020-03-10

## 2020-03-10 DIAGNOSIS — C34.12 MALIGNANT NEOPLASM OF UPPER LOBE, LEFT BRONCHUS OR LUNG: ICD-10-CM

## 2020-03-10 DIAGNOSIS — Z98.890 OTHER SPECIFIED POSTPROCEDURAL STATES: Chronic | ICD-10-CM

## 2020-03-13 ENCOUNTER — APPOINTMENT (OUTPATIENT)
Dept: HEMATOLOGY ONCOLOGY | Facility: CLINIC | Age: 80
End: 2020-03-13

## 2020-05-02 ENCOUNTER — OUTPATIENT (OUTPATIENT)
Dept: OUTPATIENT SERVICES | Facility: HOSPITAL | Age: 80
LOS: 1 days | Discharge: HOME | End: 2020-05-02

## 2020-05-02 DIAGNOSIS — C34.12 MALIGNANT NEOPLASM OF UPPER LOBE, LEFT BRONCHUS OR LUNG: ICD-10-CM

## 2020-05-02 DIAGNOSIS — Z98.890 OTHER SPECIFIED POSTPROCEDURAL STATES: Chronic | ICD-10-CM

## 2020-05-06 ENCOUNTER — APPOINTMENT (OUTPATIENT)
Dept: NUCLEAR MEDICINE | Facility: IMAGING CENTER | Age: 80
End: 2020-05-06
Payer: MEDICARE

## 2020-05-06 ENCOUNTER — OUTPATIENT (OUTPATIENT)
Dept: OUTPATIENT SERVICES | Facility: HOSPITAL | Age: 80
LOS: 1 days | End: 2020-05-06
Payer: MEDICARE

## 2020-05-06 DIAGNOSIS — Z98.890 OTHER SPECIFIED POSTPROCEDURAL STATES: Chronic | ICD-10-CM

## 2020-05-06 DIAGNOSIS — C34.12 MALIGNANT NEOPLASM OF UPPER LOBE, LEFT BRONCHUS OR LUNG: ICD-10-CM

## 2020-05-06 PROCEDURE — 78815 PET IMAGE W/CT SKULL-THIGH: CPT | Mod: 26,PS

## 2020-05-06 PROCEDURE — 78815 PET IMAGE W/CT SKULL-THIGH: CPT

## 2020-05-06 PROCEDURE — A9552: CPT

## 2020-05-08 ENCOUNTER — APPOINTMENT (OUTPATIENT)
Dept: HEMATOLOGY ONCOLOGY | Facility: CLINIC | Age: 80
End: 2020-05-08
Payer: MEDICARE

## 2020-05-08 PROCEDURE — 99215 OFFICE O/P EST HI 40 MIN: CPT | Mod: 95

## 2020-05-08 RX ORDER — CRIZOTINIB 200 MG/1
200 CAPSULE ORAL
Qty: 60 | Refills: 5 | Status: DISCONTINUED | COMMUNITY
Start: 2019-10-30 | End: 2020-05-08

## 2020-06-15 ENCOUNTER — OUTPATIENT (OUTPATIENT)
Dept: OUTPATIENT SERVICES | Facility: HOSPITAL | Age: 80
LOS: 1 days | Discharge: HOME | End: 2020-06-15

## 2020-06-15 DIAGNOSIS — C34.12 MALIGNANT NEOPLASM OF UPPER LOBE, LEFT BRONCHUS OR LUNG: ICD-10-CM

## 2020-06-15 DIAGNOSIS — Z98.890 OTHER SPECIFIED POSTPROCEDURAL STATES: Chronic | ICD-10-CM

## 2020-06-17 ENCOUNTER — LABORATORY RESULT (OUTPATIENT)
Age: 80
End: 2020-06-17

## 2020-06-17 ENCOUNTER — APPOINTMENT (OUTPATIENT)
Dept: HEMATOLOGY ONCOLOGY | Facility: CLINIC | Age: 80
End: 2020-06-17
Payer: MEDICARE

## 2020-06-17 DIAGNOSIS — Z87.19 PERSONAL HISTORY OF OTHER DISEASES OF THE DIGESTIVE SYSTEM: ICD-10-CM

## 2020-06-17 PROCEDURE — 99214 OFFICE O/P EST MOD 30 MIN: CPT | Mod: 95

## 2020-06-18 LAB
BASOPHILS # BLD AUTO: 0.06 K/UL
BASOPHILS NFR BLD AUTO: 1 %
EOSINOPHIL # BLD AUTO: 0.11 K/UL
EOSINOPHIL NFR BLD AUTO: 1.9 %
HCT VFR BLD CALC: 35.7 %
HGB BLD-MCNC: 11.3 G/DL
IMM GRANULOCYTES NFR BLD AUTO: 0.2 %
LYMPHOCYTES # BLD AUTO: 1.44 K/UL
LYMPHOCYTES NFR BLD AUTO: 24.4 %
MAN DIFF?: NORMAL
MCHC RBC-ENTMCNC: 29.4 PG
MCHC RBC-ENTMCNC: 31.7 GM/DL
MCV RBC AUTO: 93 FL
MONOCYTES # BLD AUTO: 0.55 K/UL
MONOCYTES NFR BLD AUTO: 9.3 %
NEUTROPHILS # BLD AUTO: 3.72 K/UL
NEUTROPHILS NFR BLD AUTO: 63.2 %
PLATELET # BLD AUTO: 152 K/UL
RBC # BLD: 3.84 M/UL
RBC # FLD: 14.2 %
WBC # FLD AUTO: 5.89 K/UL

## 2020-06-22 PROBLEM — Z87.19 HISTORY OF ESOPHAGITIS: Status: RESOLVED | Noted: 2017-09-01 | Resolved: 2020-06-22

## 2020-06-26 RX ORDER — OMEPRAZOLE 40 MG/1
40 CAPSULE, DELAYED RELEASE ORAL TWICE DAILY
Qty: 90 | Refills: 0 | Status: DISCONTINUED | COMMUNITY
Start: 2019-10-10 | End: 2020-06-26

## 2020-07-08 ENCOUNTER — APPOINTMENT (OUTPATIENT)
Dept: HEMATOLOGY ONCOLOGY | Facility: CLINIC | Age: 80
End: 2020-07-08
Payer: MEDICARE

## 2020-07-08 PROCEDURE — 99214 OFFICE O/P EST MOD 30 MIN: CPT | Mod: 95

## 2020-07-13 ENCOUNTER — LABORATORY RESULT (OUTPATIENT)
Age: 80
End: 2020-07-13

## 2020-07-22 ENCOUNTER — APPOINTMENT (OUTPATIENT)
Dept: NUCLEAR MEDICINE | Facility: IMAGING CENTER | Age: 80
End: 2020-07-22

## 2020-07-29 ENCOUNTER — APPOINTMENT (OUTPATIENT)
Dept: NUCLEAR MEDICINE | Facility: IMAGING CENTER | Age: 80
End: 2020-07-29

## 2020-07-29 ENCOUNTER — OUTPATIENT (OUTPATIENT)
Dept: OUTPATIENT SERVICES | Facility: HOSPITAL | Age: 80
LOS: 1 days | End: 2020-07-29
Payer: MEDICARE

## 2020-07-29 DIAGNOSIS — Z98.890 OTHER SPECIFIED POSTPROCEDURAL STATES: Chronic | ICD-10-CM

## 2020-07-29 DIAGNOSIS — C34.12 MALIGNANT NEOPLASM OF UPPER LOBE, LEFT BRONCHUS OR LUNG: ICD-10-CM

## 2020-07-29 PROCEDURE — 78815 PET IMAGE W/CT SKULL-THIGH: CPT

## 2020-07-29 PROCEDURE — A9552: CPT

## 2020-07-29 PROCEDURE — 78815 PET IMAGE W/CT SKULL-THIGH: CPT | Mod: 26,PS

## 2020-07-30 ENCOUNTER — OUTPATIENT (OUTPATIENT)
Dept: OUTPATIENT SERVICES | Facility: HOSPITAL | Age: 80
LOS: 1 days | Discharge: HOME | End: 2020-07-30

## 2020-07-30 DIAGNOSIS — Z98.890 OTHER SPECIFIED POSTPROCEDURAL STATES: Chronic | ICD-10-CM

## 2020-07-30 DIAGNOSIS — C34.12 MALIGNANT NEOPLASM OF UPPER LOBE, LEFT BRONCHUS OR LUNG: ICD-10-CM

## 2020-07-31 ENCOUNTER — APPOINTMENT (OUTPATIENT)
Dept: HEMATOLOGY ONCOLOGY | Facility: CLINIC | Age: 80
End: 2020-07-31
Payer: MEDICARE

## 2020-07-31 PROCEDURE — 99214 OFFICE O/P EST MOD 30 MIN: CPT | Mod: 95

## 2020-08-04 NOTE — H&P PST ADULT - LIVES WITH, PROFILE
I have personally performed a face to face diagnostic evaluation on this patient. I have reviewed the ACP note and agree with the history, exam and plan of care, except as noted.
spouse

## 2020-08-25 ENCOUNTER — APPOINTMENT (OUTPATIENT)
Dept: HEMATOLOGY ONCOLOGY | Facility: CLINIC | Age: 80
End: 2020-08-25
Payer: MEDICARE

## 2020-08-25 DIAGNOSIS — J38.01 PARALYSIS OF VOCAL CORDS AND LARYNX, UNILATERAL: ICD-10-CM

## 2020-08-25 PROCEDURE — 99214 OFFICE O/P EST MOD 30 MIN: CPT | Mod: 95

## 2020-09-01 PROBLEM — J38.01 VOCAL FOLD PARALYSIS, UNILATERAL: Status: ACTIVE | Noted: 2019-06-30

## 2020-09-02 ENCOUNTER — LABORATORY RESULT (OUTPATIENT)
Age: 80
End: 2020-09-02

## 2020-09-03 ENCOUNTER — LABORATORY RESULT (OUTPATIENT)
Age: 80
End: 2020-09-03

## 2020-09-23 ENCOUNTER — OUTPATIENT (OUTPATIENT)
Dept: OUTPATIENT SERVICES | Facility: HOSPITAL | Age: 80
LOS: 1 days | Discharge: HOME | End: 2020-09-23

## 2020-09-23 DIAGNOSIS — Z98.890 OTHER SPECIFIED POSTPROCEDURAL STATES: Chronic | ICD-10-CM

## 2020-09-23 DIAGNOSIS — C34.12 MALIGNANT NEOPLASM OF UPPER LOBE, LEFT BRONCHUS OR LUNG: ICD-10-CM

## 2020-09-25 ENCOUNTER — APPOINTMENT (OUTPATIENT)
Dept: MRI IMAGING | Facility: IMAGING CENTER | Age: 80
End: 2020-09-25
Payer: MEDICARE

## 2020-09-25 ENCOUNTER — OUTPATIENT (OUTPATIENT)
Dept: OUTPATIENT SERVICES | Facility: HOSPITAL | Age: 80
LOS: 1 days | End: 2020-09-25
Payer: MEDICARE

## 2020-09-25 DIAGNOSIS — Z98.890 OTHER SPECIFIED POSTPROCEDURAL STATES: Chronic | ICD-10-CM

## 2020-09-25 DIAGNOSIS — C34.12 MALIGNANT NEOPLASM OF UPPER LOBE, LEFT BRONCHUS OR LUNG: ICD-10-CM

## 2020-09-25 PROCEDURE — A9585: CPT

## 2020-09-25 PROCEDURE — 70553 MRI BRAIN STEM W/O & W/DYE: CPT

## 2020-09-25 PROCEDURE — 70553 MRI BRAIN STEM W/O & W/DYE: CPT | Mod: 26

## 2020-09-30 ENCOUNTER — APPOINTMENT (OUTPATIENT)
Dept: HEMATOLOGY ONCOLOGY | Facility: CLINIC | Age: 80
End: 2020-09-30
Payer: MEDICARE

## 2020-09-30 DIAGNOSIS — R49.0 DYSPHONIA: ICD-10-CM

## 2020-09-30 PROCEDURE — 99214 OFFICE O/P EST MOD 30 MIN: CPT | Mod: 95

## 2020-10-01 PROBLEM — R49.0 HOARSENESS: Status: ACTIVE | Noted: 2017-09-01

## 2020-10-06 LAB
ALBUMIN SERPL ELPH-MCNC: 4.1 G/DL
ALP BLD-CCNC: 119 U/L
ALT SERPL-CCNC: 15 U/L
ANION GAP SERPL CALC-SCNC: 7 MMOL/L
AST SERPL-CCNC: 23 U/L
BASOPHILS # BLD AUTO: 0.06 K/UL
BASOPHILS NFR BLD AUTO: 1.1 %
BILIRUB SERPL-MCNC: 0.3 MG/DL
BUN SERPL-MCNC: 20 MG/DL
CALCIUM SERPL-MCNC: 8.7 MG/DL
CHLORIDE SERPL-SCNC: 105 MMOL/L
CO2 SERPL-SCNC: 26 MMOL/L
CREAT SERPL-MCNC: 1.18 MG/DL
EOSINOPHIL # BLD AUTO: 0.06 K/UL
EOSINOPHIL NFR BLD AUTO: 1.1 %
GLUCOSE SERPL-MCNC: 73 MG/DL
HCT VFR BLD CALC: 38.3 %
HGB BLD-MCNC: 11.9 G/DL
IMM GRANULOCYTES NFR BLD AUTO: 0.4 %
LYMPHOCYTES # BLD AUTO: 1.74 K/UL
LYMPHOCYTES NFR BLD AUTO: 30.7 %
MAN DIFF?: NORMAL
MCHC RBC-ENTMCNC: 29.8 PG
MCHC RBC-ENTMCNC: 31.1 GM/DL
MCV RBC AUTO: 96 FL
MONOCYTES # BLD AUTO: 0.51 K/UL
MONOCYTES NFR BLD AUTO: 9 %
NEUTROPHILS # BLD AUTO: 3.28 K/UL
NEUTROPHILS NFR BLD AUTO: 57.7 %
PLATELET # BLD AUTO: 200 K/UL
POTASSIUM SERPL-SCNC: 4.4 MMOL/L
PROT SERPL-MCNC: 7.1 G/DL
RBC # BLD: 3.99 M/UL
RBC # FLD: 14.8 %
SODIUM SERPL-SCNC: 139 MMOL/L
WBC # FLD AUTO: 5.67 K/UL

## 2020-10-21 ENCOUNTER — APPOINTMENT (OUTPATIENT)
Dept: OTOLARYNGOLOGY | Facility: CLINIC | Age: 80
End: 2020-10-21
Payer: MEDICARE

## 2020-10-21 PROCEDURE — 99214 OFFICE O/P EST MOD 30 MIN: CPT | Mod: 25

## 2020-10-21 PROCEDURE — 31579 LARYNGOSCOPY TELESCOPIC: CPT

## 2020-10-21 PROCEDURE — 99072 ADDL SUPL MATRL&STAF TM PHE: CPT

## 2020-10-21 NOTE — PHYSICAL EXAM
[Midline] : trachea located in midline position [Laryngoscopy Performed] : laryngoscopy was performed, see procedure section for findings [Normal] : no rashes [de-identified] : moderately asthenic voice

## 2020-10-21 NOTE — HISTORY OF PRESENT ILLNESS
[de-identified] : on new meds for 4 months, doing much better, feeling well\par however, voice is fading, softer now\par some choking on liquids; had FEES one year ago, no aspiration\par last injection 1.5 years ago\par no pna, no hospitalizations\par

## 2020-10-21 NOTE — CONSULT LETTER
[Dear  ___] : Dear  [unfilled], [Consult Letter:] : I had the pleasure of evaluating your patient, [unfilled]. [Please see my note below.] : Please see my note below. [Consult Closing:] : Thank you very much for allowing me to participate in the care of this patient.  If you have any questions, please do not hesitate to contact me. [Sincerely,] : Sincerely, [FreeTextEntry3] : Negrito Bauer MD, PhD\par Chief, Division of Laryngology\par Department of Otolaryngology\par [facility]\par Pediatric Otolaryngology, [facility]\par  of Otolaryngology\par [facility]\par \par \par

## 2020-10-26 ENCOUNTER — APPOINTMENT (OUTPATIENT)
Dept: NUCLEAR MEDICINE | Facility: IMAGING CENTER | Age: 80
End: 2020-10-26
Payer: MEDICARE

## 2020-10-26 ENCOUNTER — OUTPATIENT (OUTPATIENT)
Dept: OUTPATIENT SERVICES | Facility: HOSPITAL | Age: 80
LOS: 1 days | End: 2020-10-26
Payer: MEDICARE

## 2020-10-26 ENCOUNTER — OUTPATIENT (OUTPATIENT)
Dept: OUTPATIENT SERVICES | Facility: HOSPITAL | Age: 80
LOS: 1 days | Discharge: HOME | End: 2020-10-26

## 2020-10-26 DIAGNOSIS — Z98.890 OTHER SPECIFIED POSTPROCEDURAL STATES: Chronic | ICD-10-CM

## 2020-10-26 DIAGNOSIS — Z00.8 ENCOUNTER FOR OTHER GENERAL EXAMINATION: ICD-10-CM

## 2020-10-26 DIAGNOSIS — C34.12 MALIGNANT NEOPLASM OF UPPER LOBE, LEFT BRONCHUS OR LUNG: ICD-10-CM

## 2020-10-26 PROCEDURE — 78815 PET IMAGE W/CT SKULL-THIGH: CPT | Mod: 26,PS

## 2020-10-26 PROCEDURE — 78815 PET IMAGE W/CT SKULL-THIGH: CPT

## 2020-10-26 PROCEDURE — A9552: CPT

## 2020-10-30 ENCOUNTER — APPOINTMENT (OUTPATIENT)
Dept: HEMATOLOGY ONCOLOGY | Facility: CLINIC | Age: 80
End: 2020-10-30

## 2020-10-30 ENCOUNTER — APPOINTMENT (OUTPATIENT)
Dept: HEMATOLOGY ONCOLOGY | Facility: CLINIC | Age: 80
End: 2020-10-30
Payer: MEDICARE

## 2020-10-30 PROCEDURE — 99214 OFFICE O/P EST MOD 30 MIN: CPT | Mod: 95

## 2020-11-03 LAB
ALBUMIN SERPL ELPH-MCNC: 3.7 G/DL
ALP BLD-CCNC: 106 U/L
ALT SERPL-CCNC: 12 U/L
ANION GAP SERPL CALC-SCNC: 6 MMOL/L
AST SERPL-CCNC: 18 U/L
BASOPHILS # BLD AUTO: 0.05 K/UL
BASOPHILS NFR BLD AUTO: 0.8 %
BILIRUB SERPL-MCNC: 0.3 MG/DL
BUN SERPL-MCNC: 17 MG/DL
CALCIUM SERPL-MCNC: 8.8 MG/DL
CHLORIDE SERPL-SCNC: 104 MMOL/L
CO2 SERPL-SCNC: 28 MMOL/L
CREAT SERPL-MCNC: 1.1 MG/DL
EOSINOPHIL # BLD AUTO: 0.14 K/UL
EOSINOPHIL NFR BLD AUTO: 2.3 %
GLUCOSE SERPL-MCNC: 73 MG/DL
HCT VFR BLD CALC: 37.1 %
HGB BLD-MCNC: 11.7 G/DL
IMM GRANULOCYTES NFR BLD AUTO: 0.3 %
LYMPHOCYTES # BLD AUTO: 1.24 K/UL
LYMPHOCYTES NFR BLD AUTO: 20.3 %
MAN DIFF?: NORMAL
MCHC RBC-ENTMCNC: 29.8 PG
MCHC RBC-ENTMCNC: 31.5 GM/DL
MCV RBC AUTO: 94.6 FL
MONOCYTES # BLD AUTO: 0.58 K/UL
MONOCYTES NFR BLD AUTO: 9.5 %
NEUTROPHILS # BLD AUTO: 4.08 K/UL
NEUTROPHILS NFR BLD AUTO: 66.8 %
PLATELET # BLD AUTO: 212 K/UL
POTASSIUM SERPL-SCNC: 5 MMOL/L
PROT SERPL-MCNC: 6.7 G/DL
RBC # BLD: 3.92 M/UL
RBC # FLD: 14.4 %
SODIUM SERPL-SCNC: 138 MMOL/L
T4 FREE SERPL-MCNC: 1.6 NG/DL
TSH SERPL-ACNC: 2.47 UIU/ML
WBC # FLD AUTO: 6.11 K/UL

## 2020-11-04 RX ORDER — ALBUTEROL 90 MCG
90 AEROSOL (GRAM) INHALATION
Refills: 0 | Status: ACTIVE | COMMUNITY

## 2020-11-12 ENCOUNTER — APPOINTMENT (OUTPATIENT)
Dept: SPEECH THERAPY | Facility: CLINIC | Age: 80
End: 2020-11-12
Payer: MEDICARE

## 2020-11-12 ENCOUNTER — APPOINTMENT (OUTPATIENT)
Dept: OTOLARYNGOLOGY | Facility: CLINIC | Age: 80
End: 2020-11-12
Payer: MEDICARE

## 2020-11-12 VITALS
DIASTOLIC BLOOD PRESSURE: 69 MMHG | BODY MASS INDEX: 23.62 KG/M2 | SYSTOLIC BLOOD PRESSURE: 118 MMHG | HEART RATE: 83 BPM | HEIGHT: 62.2 IN | WEIGHT: 130 LBS

## 2020-11-12 PROCEDURE — 99072 ADDL SUPL MATRL&STAF TM PHE: CPT

## 2020-11-12 PROCEDURE — 99214 OFFICE O/P EST MOD 30 MIN: CPT | Mod: 25

## 2020-11-12 PROCEDURE — 92613 ENDOSCOPY SWALLOW (FEES) I&R: CPT

## 2020-11-12 PROCEDURE — 31579 LARYNGOSCOPY TELESCOPIC: CPT

## 2020-11-12 NOTE — CONSULT LETTER
[Dear  ___] : Dear  [unfilled], [Consult Letter:] : I had the pleasure of evaluating your patient, [unfilled]. [Please see my note below.] : Please see my note below. [Consult Closing:] : Thank you very much for allowing me to participate in the care of this patient.  If you have any questions, please do not hesitate to contact me. [Sincerely,] : Sincerely, [FreeTextEntry3] : Negrito Bauer MD, PhD\par Chief, Division of Laryngology\par Department of Otolaryngology\par [facility]\par Pediatric Otolaryngology, [facility]\par  of Otolaryngology\par [facility] [employer]\par

## 2020-11-12 NOTE — HISTORY OF PRESENT ILLNESS
[de-identified] : 80 year female presents for follow up dysphonia. States voice has gotten worse since last visit. Reports dysphagia with solids and liquids. Reports dyspnea when walking. Denies odynophagia.

## 2020-11-12 NOTE — PHYSICAL EXAM
[Midline] : trachea located in midline position [Laryngoscopy Performed] : laryngoscopy was performed, see procedure section for findings [Normal] : no rashes [de-identified] : moderately asthenic voice

## 2020-11-24 ENCOUNTER — OUTPATIENT (OUTPATIENT)
Dept: OUTPATIENT SERVICES | Facility: HOSPITAL | Age: 80
LOS: 1 days | End: 2020-11-24
Payer: MEDICARE

## 2020-11-24 VITALS
DIASTOLIC BLOOD PRESSURE: 70 MMHG | SYSTOLIC BLOOD PRESSURE: 122 MMHG | RESPIRATION RATE: 16 BRPM | HEART RATE: 85 BPM | OXYGEN SATURATION: 98 % | HEIGHT: 62.5 IN | WEIGHT: 132.06 LBS | TEMPERATURE: 98 F

## 2020-11-24 DIAGNOSIS — J38.01 PARALYSIS OF VOCAL CORDS AND LARYNX, UNILATERAL: ICD-10-CM

## 2020-11-24 DIAGNOSIS — Z98.890 OTHER SPECIFIED POSTPROCEDURAL STATES: Chronic | ICD-10-CM

## 2020-11-24 DIAGNOSIS — C34.90 MALIGNANT NEOPLASM OF UNSPECIFIED PART OF UNSPECIFIED BRONCHUS OR LUNG: ICD-10-CM

## 2020-11-24 DIAGNOSIS — Z87.09 PERSONAL HISTORY OF OTHER DISEASES OF THE RESPIRATORY SYSTEM: Chronic | ICD-10-CM

## 2020-11-24 PROCEDURE — 93010 ELECTROCARDIOGRAM REPORT: CPT

## 2020-11-24 RX ORDER — RANITIDINE HYDROCHLORIDE 150 MG/1
1 TABLET, FILM COATED ORAL
Qty: 0 | Refills: 0 | DISCHARGE

## 2020-11-24 RX ORDER — AMLODIPINE BESYLATE 2.5 MG/1
1 TABLET ORAL
Qty: 0 | Refills: 0 | DISCHARGE

## 2020-11-24 RX ORDER — SODIUM CHLORIDE 9 MG/ML
1000 INJECTION, SOLUTION INTRAVENOUS
Refills: 0 | Status: DISCONTINUED | OUTPATIENT
Start: 2020-01-01 | End: 2020-01-01

## 2020-11-24 RX ORDER — LEVOTHYROXINE SODIUM 125 MCG
1 TABLET ORAL
Qty: 0 | Refills: 0 | DISCHARGE

## 2020-11-24 RX ORDER — FLUOCINOLONE ACETONIDE 0.25 MG/G
1 CREAM TOPICAL
Qty: 0 | Refills: 0 | DISCHARGE

## 2020-11-24 RX ORDER — OMEPRAZOLE 10 MG/1
1 CAPSULE, DELAYED RELEASE ORAL
Qty: 0 | Refills: 0 | DISCHARGE

## 2020-11-24 NOTE — H&P PST ADULT - MUSCULOSKELETAL
details… detailed exam no calf tenderness/no joint swelling/no joint erythema/no joint warmth/normal strength

## 2020-11-24 NOTE — H&P PST ADULT - AS BP NONINV METHOD
January 12, 2018      [facility]  [address]  [facility] [address]  Phone: [phone]  Fax: [phone]       Patient: Agusto Meza   YOB: 2002  Date of Visit: 01/12/2018    To Whom It May Concern:    Terry Meza  was at [facility] on 01/12/2018. Please excuse Graham from school on 1/8/2018 - 1/10/2018. He may now return to school. If you have any questions or concerns, or if I can be of further assistance, please do not hesitate to contact me.    Sincerely,      Kaci Franco NP     
auscultated w/ stethoscope

## 2020-11-24 NOTE — H&P PST ADULT - RS GEN PE MLT RESP DETAILS PC
no rales/no wheezes/no subcutaneous emphysema/no chest wall tenderness/no intercostal retractions/no rhonchi/good air movement/airway patent/breath sounds equal/respirations non-labored

## 2020-11-24 NOTE — H&P PST ADULT - NSICDXPASTSURGICALHX_GEN_ALL_CORE_FT
PAST SURGICAL HISTORY:  H/O colonoscopy     H/O vocal cord paralysis filter injection 2018    History of lung biopsy 2017

## 2020-11-24 NOTE — H&P PST ADULT - NSICDXPASTMEDICALHX_GEN_ALL_CORE_FT
PAST MEDICAL HISTORY:  Asthma     GERD (gastroesophageal reflux disease)     Hypertension     Hypothyroidism     Lung cancer left upper lobe - receiving chemo and radiation    Other nonspecific abnormal finding of lung field     Paralysis of vocal cords      PAST MEDICAL HISTORY:  Asthma not on any nebulizer.    GERD (gastroesophageal reflux disease)     Hypertension     Hypothyroidism     Lung cancer left upper lobe - receiving chemo and radiation    Other nonspecific abnormal finding of lung field     Paralysis of vocal cords      PAST MEDICAL HISTORY:  Asthma not on any nebulizer.    GERD (gastroesophageal reflux disease)     Hypertension     Hypothyroidism     Lung cancer left upper lobe - received  chemo and radiation    Other nonspecific abnormal finding of lung field     Paralysis of vocal cords

## 2020-11-24 NOTE — H&P PST ADULT - NSICDXPROBLEM_GEN_ALL_CORE_FT
PROBLEM DIAGNOSES  Problem: Paralysis of vocal cords and larynx, unilateral  Assessment and Plan: Patient tentatively scheduled for        PROBLEM DIAGNOSES  Problem: Paralysis of vocal cords and larynx, unilateral  Assessment and Plan: Patient tentatively scheduled for thyroplasty medialization bronchoscopy pharyngoplasty on 11/30/20.  Pre-op instructions provided. Pt given verbal and written instructions with teach back on chlorhexidine shampoo and will take her own omeprazole. Pt verbalized understanding with return demonstration.   Covid testing scheduled prior to surgery as per patient.        PROBLEM DIAGNOSES  Problem: Paralysis of vocal cords and larynx, unilateral  Assessment and Plan: Patient tentatively scheduled for thyroplasty medialization bronchoscopy pharyngoplasty on 11/30/20.  Pre-op instructions provided. Pt given verbal and written instructions with teach back on chlorhexidine shampoo and will take her own omeprazole. Pt verbalized understanding with return demonstration.   Covid testing scheduled prior to surgery as per patient.     Problem: Lung cancer  Assessment and Plan: Patient instructed to take tabrecta with a sip of water on the morning of procedure.

## 2020-11-24 NOTE — H&P PST ADULT - NEGATIVE GENERAL GENITOURINARY SYMPTOMS
normal urinary frequency/no flank pain R/no incontinence/no flank pain L/no dysuria/no hematuria/no bladder infections

## 2020-11-24 NOTE — H&P PST ADULT - HISTORY OF PRESENT ILLNESS
78 yo Occitan speaking  female with PMH hypothyroidism, GERD, hypertension,  asthma, lung cancer s/p chemo and radiation last tx  2017 followed by immunotherapy-completed 02/2019.. Patient with h/o Paralysis of vocal cord and larynx , unilateral scheduled for of Patient with c/o hoarsens of voice which has been increasing and difficulty swallowing . 78 yo Italian speaking  female with PMH hypothyroidism, GERD, hypertension,  asthma, lung cancer s/p chemo and radiation last tx  2017 followed by immunotherapy-completed 02/2019.. Patient with h/o Paralysis of vocal cord and larynx , unilateral scheduled for thyroplasty medialization bronchoscopy pharyngoplasty.  Patient with c/o hoarsens of voice which has been increasing recently  and difficulty swallowing . 76 yo Upper sorbian speaking  female with PMH hypothyroidism, GERD, hypertension,  asthma, lung cancer s/p chemo and radiation last tx  2017 followed by immunotherapy-completed 02/2019.. Patient with h/o Paralysis of vocal cord and larynx since diagnosed with lung cancer  , unilateral scheduled for thyroplasty medialization bronchoscopy pharyngoplasty.  Patient with c/o hoarsens of voice which has been increasing recently  with  difficulty swallowing .

## 2020-11-24 NOTE — H&P PST ADULT - NSANTHOSAYNRD_GEN_A_CORE
No. DEION screening performed.  STOP BANG Legend: 0-2 = LOW Risk; 3-4 = INTERMEDIATE Risk; 5-8 = HIGH Risk

## 2020-11-24 NOTE — H&P PST ADULT - NEGATIVE MUSCULOSKELETAL SYMPTOMS
no myalgia/no muscle cramps/no joint swelling/no neck pain/no leg pain R/no back pain/no leg pain L/no arm pain L/no arm pain R/no stiffness/no muscle weakness/no arthralgia

## 2020-11-24 NOTE — H&P PST ADULT - NEGATIVE NEUROLOGICAL SYMPTOMS
no headache/no transient paralysis/no vertigo/no loss of sensation/no weakness/no generalized seizures/no difficulty walking

## 2020-11-25 ENCOUNTER — LABORATORY RESULT (OUTPATIENT)
Age: 80
End: 2020-11-25

## 2020-11-27 ENCOUNTER — APPOINTMENT (OUTPATIENT)
Dept: DISASTER EMERGENCY | Facility: CLINIC | Age: 80
End: 2020-11-27

## 2020-12-04 PROBLEM — J38.00 PARALYSIS OF VOCAL CORDS AND LARYNX, UNSPECIFIED: Chronic | Status: ACTIVE | Noted: 2020-11-24

## 2020-12-11 PROBLEM — Z01.818 PREOP TESTING: Status: ACTIVE | Noted: 2020-01-01

## 2020-12-14 NOTE — ASU DISCHARGE PLAN (ADULT/PEDIATRIC) - CALL YOUR DOCTOR IF YOU HAVE ANY OF THE FOLLOWING:
Nausea and vomiting that does not stop/Bleeding that does not stop/Swelling that gets worse/Wound/Surgical Site with redness, or foul smelling discharge or pus/Pain not relieved by Medications/Inability to tolerate liquids or foods

## 2020-12-14 NOTE — ASU PREOP CHECKLIST - BP NONINVASIVE DIASTOLIC (MM HG)
61 Bi-Rhombic Flap Text: The defect edges were debeveled with a #15 scalpel blade.  Given the location of the defect and the proximity to free margins a bi-rhombic flap was deemed most appropriate.  Using a sterile surgical marker, an appropriate rhombic flap was drawn incorporating the defect. The area thus outlined was incised deep to adipose tissue with a #15 scalpel blade.  The skin margins were undermined to an appropriate distance in all directions utilizing iris scissors.

## 2020-12-14 NOTE — ASU DISCHARGE PLAN (ADULT/PEDIATRIC) - ASU DC SPECIAL INSTRUCTIONSFT
Surgical Site Care  Please leave the site of surgery clean and dry. The dressing will be removed in clinic during your clinic follow up.  After Surgery Instructions  Hygiene  Please do not shower or bathe for 72 hours. After please be gentle with the site of surgery. No scrubbing or rubbing. May let water and soap over surgical site  Diet  Return to your usual diet  Activity  No heavy lifting or strenuous activity for 2 weeks. You should resume casual activity.  Voice rest for 1 week. Do not yell, scream, whisper. Easy voice use for 1 week  Medications  Please resume your normal medications   Pain medications  For mild or moderate pain, please take over the counter tylenol or motrin as needed for pain  For severe pain, may take percocet/oxycodone  Follow up  Please follow up with Dr. Bauer. Please call the otolaryngology office at  to confirm your appointment

## 2020-12-15 NOTE — PROGRESS NOTE ADULT - SUBJECTIVE AND OBJECTIVE BOX
Interval:  Pain overnight  some issues with PO    Vital Signs Last 24 Hrs  T(C): 36.5 (15 Dec 2020 04:44), Max: 37 (14 Dec 2020 12:17)  T(F): 97.7 (15 Dec 2020 04:44), Max: 98.6 (14 Dec 2020 12:17)  HR: 89 (15 Dec 2020 04:44) (74 - 91)  BP: 117/73 (15 Dec 2020 04:44) (102/50 - 126/65)  BP(mean): 71 (14 Dec 2020 20:00) (62 - 82)  RR: 18 (15 Dec 2020 04:44) (14 - 33)  SpO2: 100% (15 Dec 2020 04:44) (94% - 100%)    12-14-20 @ 07:01  -  12-15-20 @ 06:04  --------------------------------------------------------  IN: 940 mL / OUT: 1100 mL / NET: -160 mL    Physical Exam:  Alert, NAD  breathing comfortably on RA  OC/OP clear  neck left neck incision site with penrose, dressing in place. neck slightly tender, soft, minimal swelling       A/P:   76 yo Turkish speaking  female with PMH hypothyroidism, GERD, hypertension,  asthma, lung cancer s/p chemo and radiation last tx 2017 followed by immunotherapy-completed 02/2019 complicated by left vocal cord paralysis now s/p left thyroplasty medialization 12/14    -monitor PO  -left neck penrose, dressing change daily  -pain control  -home meds  -ppi  -oob ad margarette  -sqh

## 2020-12-15 NOTE — DISCHARGE NOTE NURSING/CASE MANAGEMENT/SOCIAL WORK - PATIENT PORTAL LINK FT
You can access the FollowMyHealth Patient Portal offered by [facility] by registering at the following website: [email or web address]. By joining [facility]’s FollowMyHealth portal, you will also be able to view your health information using other applications (apps) compatible with our system.
none

## 2020-12-15 NOTE — DISCHARGE NOTE PROVIDER - CARE PROVIDER_API CALL
Negrito Bauer  OTOLARYNGOLOGY  [facility]  Dept of Otolaryngology, [address] [place] [address]  Phone: [phone]  Fax: [phone]  Follow Up Time:

## 2020-12-15 NOTE — DISCHARGE NOTE PROVIDER - NSDCMRMEDTOKEN_GEN_ALL_CORE_FT
amLODIPine 10 mg oral tablet: 1 tab(s) orally once a day, pm  omeprazole 40 mg oral delayed release capsule: 1 cap(s) orally once a day, am  oxycodone-acetaminophen 5 mg-325 mg oral tablet: 1 tab(s) orally every 6 hours, As Needed severe pain MDD:4   Synthroid 75 mcg (0.075 mg) oral tablet: 1 tab(s) orally once a day, pm  Tabrecta 150 mg oral tablet: 1 tab(s) orally 2 times a day

## 2020-12-15 NOTE — DISCHARGE NOTE PROVIDER - NSDCCPCAREPLAN_GEN_ALL_CORE_FT
PRINCIPAL DISCHARGE DIAGNOSIS  Diagnosis: Vocal cord paresis  Assessment and Plan of Treatment:

## 2021-01-01 ENCOUNTER — APPOINTMENT (OUTPATIENT)
Dept: HEMATOLOGY ONCOLOGY | Facility: CLINIC | Age: 81
End: 2021-01-01
Payer: MEDICARE

## 2021-01-01 ENCOUNTER — RESULT REVIEW (OUTPATIENT)
Age: 81
End: 2021-01-01

## 2021-01-01 ENCOUNTER — APPOINTMENT (OUTPATIENT)
Dept: INFUSION THERAPY | Facility: HOSPITAL | Age: 81
End: 2021-01-01

## 2021-01-01 ENCOUNTER — NON-APPOINTMENT (OUTPATIENT)
Age: 81
End: 2021-01-01

## 2021-01-01 ENCOUNTER — APPOINTMENT (OUTPATIENT)
Dept: OTOLARYNGOLOGY | Facility: CLINIC | Age: 81
End: 2021-01-01
Payer: MEDICARE

## 2021-01-01 ENCOUNTER — APPOINTMENT (OUTPATIENT)
Dept: NUCLEAR MEDICINE | Facility: IMAGING CENTER | Age: 81
End: 2021-01-01
Payer: MEDICARE

## 2021-01-01 ENCOUNTER — APPOINTMENT (OUTPATIENT)
Dept: HEMATOLOGY ONCOLOGY | Facility: CLINIC | Age: 81
End: 2021-01-01
Payer: COMMERCIAL

## 2021-01-01 ENCOUNTER — LABORATORY RESULT (OUTPATIENT)
Age: 81
End: 2021-01-01

## 2021-01-01 ENCOUNTER — APPOINTMENT (OUTPATIENT)
Dept: THORACIC SURGERY | Facility: HOSPITAL | Age: 81
End: 2021-01-01

## 2021-01-01 ENCOUNTER — OUTPATIENT (OUTPATIENT)
Dept: OUTPATIENT SERVICES | Facility: HOSPITAL | Age: 81
LOS: 1 days | End: 2021-01-01
Payer: MEDICARE

## 2021-01-01 ENCOUNTER — OUTPATIENT (OUTPATIENT)
Dept: OUTPATIENT SERVICES | Facility: HOSPITAL | Age: 81
LOS: 1 days | Discharge: HOME | End: 2021-01-01

## 2021-01-01 ENCOUNTER — APPOINTMENT (OUTPATIENT)
Dept: THORACIC SURGERY | Facility: CLINIC | Age: 81
End: 2021-01-01
Payer: MEDICARE

## 2021-01-01 ENCOUNTER — APPOINTMENT (OUTPATIENT)
Dept: HEMATOLOGY ONCOLOGY | Facility: CLINIC | Age: 81
End: 2021-01-01

## 2021-01-01 ENCOUNTER — TRANSCRIPTION ENCOUNTER (OUTPATIENT)
Age: 81
End: 2021-01-01

## 2021-01-01 ENCOUNTER — APPOINTMENT (OUTPATIENT)
Dept: RADIOLOGY | Facility: HOSPITAL | Age: 81
End: 2021-01-01

## 2021-01-01 ENCOUNTER — APPOINTMENT (OUTPATIENT)
Dept: DISASTER EMERGENCY | Facility: CLINIC | Age: 81
End: 2021-01-01

## 2021-01-01 ENCOUNTER — OUTPATIENT (OUTPATIENT)
Dept: OUTPATIENT SERVICES | Facility: HOSPITAL | Age: 81
LOS: 1 days | End: 2021-01-01

## 2021-01-01 ENCOUNTER — OUTPATIENT (OUTPATIENT)
Dept: OUTPATIENT SERVICES | Facility: HOSPITAL | Age: 81
LOS: 1 days | Discharge: HOME | End: 2021-01-01
Payer: MEDICARE

## 2021-01-01 ENCOUNTER — APPOINTMENT (OUTPATIENT)
Dept: CT IMAGING | Facility: IMAGING CENTER | Age: 81
End: 2021-01-01
Payer: COMMERCIAL

## 2021-01-01 ENCOUNTER — INPATIENT (INPATIENT)
Facility: HOSPITAL | Age: 81
LOS: 7 days | Discharge: HOME HEALTH | End: 2021-11-13
Attending: INTERNAL MEDICINE | Admitting: INTERNAL MEDICINE
Payer: MEDICARE

## 2021-01-01 ENCOUNTER — APPOINTMENT (OUTPATIENT)
Dept: SPEECH THERAPY | Facility: CLINIC | Age: 81
End: 2021-01-01

## 2021-01-01 ENCOUNTER — APPOINTMENT (OUTPATIENT)
Dept: OTOLARYNGOLOGY | Facility: CLINIC | Age: 81
End: 2021-01-01

## 2021-01-01 VITALS
TEMPERATURE: 98 F | OXYGEN SATURATION: 99 % | DIASTOLIC BLOOD PRESSURE: 82 MMHG | RESPIRATION RATE: 18 BRPM | SYSTOLIC BLOOD PRESSURE: 139 MMHG | HEART RATE: 89 BPM

## 2021-01-01 VITALS
RESPIRATION RATE: 16 BRPM | OXYGEN SATURATION: 97 % | HEART RATE: 81 BPM | SYSTOLIC BLOOD PRESSURE: 126 MMHG | DIASTOLIC BLOOD PRESSURE: 54 MMHG

## 2021-01-01 VITALS
SYSTOLIC BLOOD PRESSURE: 110 MMHG | OXYGEN SATURATION: 97 % | DIASTOLIC BLOOD PRESSURE: 67 MMHG | RESPIRATION RATE: 17 BRPM | HEART RATE: 83 BPM | TEMPERATURE: 97.8 F | HEIGHT: 62.6 IN | WEIGHT: 124.78 LBS | BODY MASS INDEX: 22.39 KG/M2

## 2021-01-01 VITALS
HEART RATE: 91 BPM | BODY MASS INDEX: 22.94 KG/M2 | RESPIRATION RATE: 16 BRPM | WEIGHT: 127.87 LBS | SYSTOLIC BLOOD PRESSURE: 140 MMHG | HEIGHT: 62.6 IN | OXYGEN SATURATION: 97 % | DIASTOLIC BLOOD PRESSURE: 66 MMHG | TEMPERATURE: 97 F

## 2021-01-01 VITALS
DIASTOLIC BLOOD PRESSURE: 70 MMHG | WEIGHT: 108.03 LBS | SYSTOLIC BLOOD PRESSURE: 101 MMHG | TEMPERATURE: 98.1 F | RESPIRATION RATE: 16 BRPM | HEART RATE: 90 BPM | BODY MASS INDEX: 19.38 KG/M2 | OXYGEN SATURATION: 99 %

## 2021-01-01 VITALS
WEIGHT: 128.31 LBS | RESPIRATION RATE: 16 BRPM | BODY MASS INDEX: 23.47 KG/M2 | HEART RATE: 70 BPM | OXYGEN SATURATION: 96 % | DIASTOLIC BLOOD PRESSURE: 72 MMHG | TEMPERATURE: 97 F | SYSTOLIC BLOOD PRESSURE: 130 MMHG

## 2021-01-01 VITALS
SYSTOLIC BLOOD PRESSURE: 118 MMHG | RESPIRATION RATE: 16 BRPM | BODY MASS INDEX: 21.56 KG/M2 | OXYGEN SATURATION: 99 % | TEMPERATURE: 97.3 F | DIASTOLIC BLOOD PRESSURE: 72 MMHG | WEIGHT: 120.15 LBS | HEIGHT: 62.6 IN | HEART RATE: 101 BPM

## 2021-01-01 VITALS
HEIGHT: 62.6 IN | TEMPERATURE: 97.2 F | DIASTOLIC BLOOD PRESSURE: 74 MMHG | HEART RATE: 77 BPM | OXYGEN SATURATION: 98 % | SYSTOLIC BLOOD PRESSURE: 110 MMHG | RESPIRATION RATE: 16 BRPM | WEIGHT: 119.71 LBS | BODY MASS INDEX: 21.48 KG/M2

## 2021-01-01 VITALS
HEART RATE: 94 BPM | TEMPERATURE: 97.2 F | WEIGHT: 130 LBS | BODY MASS INDEX: 23.92 KG/M2 | SYSTOLIC BLOOD PRESSURE: 97 MMHG | DIASTOLIC BLOOD PRESSURE: 62 MMHG | HEIGHT: 62 IN

## 2021-01-01 VITALS
OXYGEN SATURATION: 98 % | TEMPERATURE: 98.4 F | SYSTOLIC BLOOD PRESSURE: 124 MMHG | BODY MASS INDEX: 23.92 KG/M2 | DIASTOLIC BLOOD PRESSURE: 74 MMHG | HEART RATE: 80 BPM | WEIGHT: 130 LBS | HEIGHT: 62 IN

## 2021-01-01 VITALS
HEART RATE: 80 BPM | SYSTOLIC BLOOD PRESSURE: 124 MMHG | BODY MASS INDEX: 23.92 KG/M2 | HEIGHT: 62 IN | TEMPERATURE: 97.4 F | DIASTOLIC BLOOD PRESSURE: 75 MMHG | WEIGHT: 130 LBS

## 2021-01-01 VITALS
RESPIRATION RATE: 16 BRPM | DIASTOLIC BLOOD PRESSURE: 62 MMHG | HEART RATE: 83 BPM | TEMPERATURE: 97 F | OXYGEN SATURATION: 98 % | SYSTOLIC BLOOD PRESSURE: 110 MMHG | HEIGHT: 63 IN | WEIGHT: 130.07 LBS

## 2021-01-01 VITALS
OXYGEN SATURATION: 100 % | HEIGHT: 63 IN | TEMPERATURE: 98 F | HEART RATE: 50 BPM | RESPIRATION RATE: 16 BRPM | SYSTOLIC BLOOD PRESSURE: 145 MMHG | DIASTOLIC BLOOD PRESSURE: 53 MMHG

## 2021-01-01 VITALS
BODY MASS INDEX: 20.77 KG/M2 | WEIGHT: 115.74 LBS | OXYGEN SATURATION: 97 % | HEART RATE: 87 BPM | RESPIRATION RATE: 18 BRPM | HEIGHT: 62.6 IN | DIASTOLIC BLOOD PRESSURE: 75 MMHG | SYSTOLIC BLOOD PRESSURE: 122 MMHG

## 2021-01-01 VITALS
DIASTOLIC BLOOD PRESSURE: 67 MMHG | RESPIRATION RATE: 17 BRPM | BODY MASS INDEX: 22.43 KG/M2 | TEMPERATURE: 97.9 F | OXYGEN SATURATION: 97 % | HEART RATE: 92 BPM | SYSTOLIC BLOOD PRESSURE: 107 MMHG | HEIGHT: 62.6 IN | WEIGHT: 125 LBS

## 2021-01-01 VITALS
HEIGHT: 63 IN | DIASTOLIC BLOOD PRESSURE: 60 MMHG | OXYGEN SATURATION: 98 % | TEMPERATURE: 98 F | HEART RATE: 77 BPM | WEIGHT: 130.07 LBS | SYSTOLIC BLOOD PRESSURE: 113 MMHG | RESPIRATION RATE: 16 BRPM

## 2021-01-01 DIAGNOSIS — Z98.890 OTHER SPECIFIED POSTPROCEDURAL STATES: Chronic | ICD-10-CM

## 2021-01-01 DIAGNOSIS — Z87.09 PERSONAL HISTORY OF OTHER DISEASES OF THE RESPIRATORY SYSTEM: Chronic | ICD-10-CM

## 2021-01-01 DIAGNOSIS — D72.829 ELEVATED WHITE BLOOD CELL COUNT, UNSPECIFIED: ICD-10-CM

## 2021-01-01 DIAGNOSIS — R91.8 OTHER NONSPECIFIC ABNORMAL FINDING OF LUNG FIELD: ICD-10-CM

## 2021-01-01 DIAGNOSIS — J45.909 UNSPECIFIED ASTHMA, UNCOMPLICATED: ICD-10-CM

## 2021-01-01 DIAGNOSIS — C34.12 MALIGNANT NEOPLASM OF UPPER LOBE, LEFT BRONCHUS OR LUNG: ICD-10-CM

## 2021-01-01 DIAGNOSIS — B37.81 CANDIDAL ESOPHAGITIS: ICD-10-CM

## 2021-01-01 DIAGNOSIS — Z29.9 ENCOUNTER FOR PROPHYLACTIC MEASURES, UNSPECIFIED: ICD-10-CM

## 2021-01-01 DIAGNOSIS — C34.90 MALIGNANT NEOPLASM OF UNSPECIFIED PART OF UNSPECIFIED BRONCHUS OR LUNG: ICD-10-CM

## 2021-01-01 DIAGNOSIS — I48.91 UNSPECIFIED ATRIAL FIBRILLATION: ICD-10-CM

## 2021-01-01 DIAGNOSIS — Z01.812 ENCOUNTER FOR PREPROCEDURAL LABORATORY EXAMINATION: ICD-10-CM

## 2021-01-01 DIAGNOSIS — E03.9 HYPOTHYROIDISM, UNSPECIFIED: ICD-10-CM

## 2021-01-01 DIAGNOSIS — C77.1 SECONDARY AND UNSPECIFIED MALIGNANT NEOPLASM OF INTRATHORACIC LYMPH NODES: ICD-10-CM

## 2021-01-01 DIAGNOSIS — R63.0 ANOREXIA: ICD-10-CM

## 2021-01-01 DIAGNOSIS — R13.10 DYSPHAGIA, UNSPECIFIED: ICD-10-CM

## 2021-01-01 DIAGNOSIS — R11.2 NAUSEA WITH VOMITING, UNSPECIFIED: ICD-10-CM

## 2021-01-01 DIAGNOSIS — R13.13 DYSPHAGIA, PHARYNGEAL PHASE: ICD-10-CM

## 2021-01-01 DIAGNOSIS — L30.9 DERMATITIS, UNSPECIFIED: ICD-10-CM

## 2021-01-01 DIAGNOSIS — Z51.11 ENCOUNTER FOR ANTINEOPLASTIC CHEMOTHERAPY: ICD-10-CM

## 2021-01-01 DIAGNOSIS — K21.9 GASTRO-ESOPHAGEAL REFLUX DISEASE W/OUT ESOPHAGITIS: ICD-10-CM

## 2021-01-01 DIAGNOSIS — Z00.8 ENCOUNTER FOR OTHER GENERAL EXAMINATION: ICD-10-CM

## 2021-01-01 DIAGNOSIS — D50.9 IRON DEFICIENCY ANEMIA, UNSPECIFIED: ICD-10-CM

## 2021-01-01 DIAGNOSIS — Z51.5 ENCOUNTER FOR PALLIATIVE CARE: ICD-10-CM

## 2021-01-01 DIAGNOSIS — R42 DIZZINESS AND GIDDINESS: ICD-10-CM

## 2021-01-01 DIAGNOSIS — K21.9 GASTRO-ESOPHAGEAL REFLUX DISEASE WITHOUT ESOPHAGITIS: ICD-10-CM

## 2021-01-01 DIAGNOSIS — I10 ESSENTIAL (PRIMARY) HYPERTENSION: ICD-10-CM

## 2021-01-01 DIAGNOSIS — R62.7 ADULT FAILURE TO THRIVE: ICD-10-CM

## 2021-01-01 DIAGNOSIS — D64.9 ANEMIA, UNSPECIFIED: ICD-10-CM

## 2021-01-01 DIAGNOSIS — R79.89 OTHER SPECIFIED ABNORMAL FINDINGS OF BLOOD CHEMISTRY: ICD-10-CM

## 2021-01-01 DIAGNOSIS — J38.00 PARALYSIS OF VOCAL CORDS AND LARYNX, UNSPECIFIED: ICD-10-CM

## 2021-01-01 DIAGNOSIS — B37.0 CANDIDAL ESOPHAGITIS: ICD-10-CM

## 2021-01-01 DIAGNOSIS — R05.3 CHRONIC COUGH: ICD-10-CM

## 2021-01-01 DIAGNOSIS — E87.5 HYPERKALEMIA: ICD-10-CM

## 2021-01-01 DIAGNOSIS — K59.00 CONSTIPATION, UNSPECIFIED: ICD-10-CM

## 2021-01-01 LAB
ALBUMIN SERPL ELPH-MCNC: 2.3 G/DL — LOW (ref 3.3–5)
ALBUMIN SERPL ELPH-MCNC: 2.5 G/DL — LOW (ref 3.3–5)
ALBUMIN SERPL ELPH-MCNC: 2.5 G/DL — LOW (ref 3.3–5)
ALBUMIN SERPL ELPH-MCNC: 2.9 G/DL — LOW (ref 3.3–5)
ALBUMIN SERPL ELPH-MCNC: 3.5 G/DL
ALBUMIN SERPL ELPH-MCNC: 3.6 G/DL
ALBUMIN SERPL ELPH-MCNC: 3.7 G/DL — SIGNIFICANT CHANGE UP (ref 3.3–5)
ALBUMIN SERPL ELPH-MCNC: 4.1 G/DL
ALP BLD-CCNC: 108 U/L
ALP BLD-CCNC: 125 U/L
ALP BLD-CCNC: 127 U/L
ALP BLD-CCNC: 130 U/L
ALP BLD-CCNC: 135 U/L
ALP SERPL-CCNC: 106 U/L — SIGNIFICANT CHANGE UP (ref 40–120)
ALP SERPL-CCNC: 108 U/L — SIGNIFICANT CHANGE UP (ref 40–120)
ALP SERPL-CCNC: 108 U/L — SIGNIFICANT CHANGE UP (ref 40–120)
ALP SERPL-CCNC: 120 U/L — SIGNIFICANT CHANGE UP (ref 40–120)
ALP SERPL-CCNC: 142 U/L — HIGH (ref 40–120)
ALT FLD-CCNC: 13 U/L — SIGNIFICANT CHANGE UP (ref 4–33)
ALT FLD-CCNC: 16 U/L — SIGNIFICANT CHANGE UP (ref 4–33)
ALT FLD-CCNC: 16 U/L — SIGNIFICANT CHANGE UP (ref 4–33)
ALT FLD-CCNC: 18 U/L — SIGNIFICANT CHANGE UP (ref 4–33)
ALT FLD-CCNC: 21 U/L — SIGNIFICANT CHANGE UP (ref 4–33)
ALT SERPL-CCNC: 10 U/L
ALT SERPL-CCNC: 14 U/L
ALT SERPL-CCNC: 16 U/L
ALT SERPL-CCNC: 24 U/L
ALT SERPL-CCNC: 30 U/L
AMYLASE/CREAT SERPL: 51 U/L
ANION GAP SERPL CALC-SCNC: 10 MMOL/L
ANION GAP SERPL CALC-SCNC: 11 MMOL/L
ANION GAP SERPL CALC-SCNC: 11 MMOL/L — SIGNIFICANT CHANGE UP (ref 7–14)
ANION GAP SERPL CALC-SCNC: 13 MMOL/L — SIGNIFICANT CHANGE UP (ref 7–14)
ANION GAP SERPL CALC-SCNC: 15 MMOL/L — HIGH (ref 7–14)
ANION GAP SERPL CALC-SCNC: 16 MMOL/L — HIGH (ref 7–14)
ANION GAP SERPL CALC-SCNC: 16 MMOL/L — HIGH (ref 7–14)
ANION GAP SERPL CALC-SCNC: 17 MMOL/L — HIGH (ref 7–14)
ANION GAP SERPL CALC-SCNC: 18 MMOL/L — HIGH (ref 7–14)
ANION GAP SERPL CALC-SCNC: 8 MMOL/L
ANION GAP SERPL CALC-SCNC: 8 MMOL/L
ANION GAP SERPL CALC-SCNC: 9 MMOL/L
APPEARANCE UR: CLEAR — SIGNIFICANT CHANGE UP
APTT BLD: 31.1 SEC
AST SERPL-CCNC: 14 U/L
AST SERPL-CCNC: 14 U/L — SIGNIFICANT CHANGE UP (ref 4–32)
AST SERPL-CCNC: 17 U/L
AST SERPL-CCNC: 19 U/L — SIGNIFICANT CHANGE UP (ref 4–32)
AST SERPL-CCNC: 20 U/L — SIGNIFICANT CHANGE UP (ref 4–32)
AST SERPL-CCNC: 23 U/L
AST SERPL-CCNC: 23 U/L — SIGNIFICANT CHANGE UP (ref 4–32)
AST SERPL-CCNC: 24 U/L
AST SERPL-CCNC: 25 U/L — SIGNIFICANT CHANGE UP (ref 4–32)
AST SERPL-CCNC: 27 U/L
B PERT DNA SPEC QL NAA+PROBE: SIGNIFICANT CHANGE UP
B PERT+PARAPERT DNA PNL SPEC NAA+PROBE: SIGNIFICANT CHANGE UP
BASE EXCESS BLDV CALC-SCNC: 2.6 MMOL/L — SIGNIFICANT CHANGE UP (ref -2–3)
BASOPHILS # BLD AUTO: 0 K/UL — SIGNIFICANT CHANGE UP (ref 0–0.2)
BASOPHILS # BLD AUTO: 0.01 K/UL — SIGNIFICANT CHANGE UP (ref 0–0.2)
BASOPHILS # BLD AUTO: 0.02 K/UL — SIGNIFICANT CHANGE UP (ref 0–0.2)
BASOPHILS # BLD AUTO: 0.04 K/UL
BASOPHILS # BLD AUTO: 0.04 K/UL — SIGNIFICANT CHANGE UP (ref 0–0.2)
BASOPHILS # BLD AUTO: 0.05 K/UL
BASOPHILS # BLD AUTO: 0.05 K/UL
BASOPHILS NFR BLD AUTO: 0 % — SIGNIFICANT CHANGE UP (ref 0–2)
BASOPHILS NFR BLD AUTO: 0.2 % — SIGNIFICANT CHANGE UP (ref 0–2)
BASOPHILS NFR BLD AUTO: 0.3 % — SIGNIFICANT CHANGE UP (ref 0–2)
BASOPHILS NFR BLD AUTO: 0.3 % — SIGNIFICANT CHANGE UP (ref 0–2)
BASOPHILS NFR BLD AUTO: 0.6 %
BASOPHILS NFR BLD AUTO: 0.6 %
BASOPHILS NFR BLD AUTO: 0.6 % — SIGNIFICANT CHANGE UP (ref 0–2)
BASOPHILS NFR BLD AUTO: 0.8 %
BILIRUB SERPL-MCNC: 0.2 MG/DL
BILIRUB SERPL-MCNC: 0.2 MG/DL
BILIRUB SERPL-MCNC: 0.3 MG/DL
BILIRUB SERPL-MCNC: 0.3 MG/DL
BILIRUB SERPL-MCNC: 0.4 MG/DL — SIGNIFICANT CHANGE UP (ref 0.2–1.2)
BILIRUB SERPL-MCNC: 0.5 MG/DL
BILIRUB SERPL-MCNC: 0.5 MG/DL — SIGNIFICANT CHANGE UP (ref 0.2–1.2)
BILIRUB SERPL-MCNC: 0.6 MG/DL — SIGNIFICANT CHANGE UP (ref 0.2–1.2)
BILIRUB UR-MCNC: ABNORMAL
BLOOD GAS VENOUS COMPREHENSIVE RESULT: SIGNIFICANT CHANGE UP
BORDETELLA PARAPERTUSSIS (RAPRVP): SIGNIFICANT CHANGE UP
BUN SERPL-MCNC: 12 MG/DL
BUN SERPL-MCNC: 12 MG/DL
BUN SERPL-MCNC: 13 MG/DL — SIGNIFICANT CHANGE UP (ref 7–23)
BUN SERPL-MCNC: 15 MG/DL
BUN SERPL-MCNC: 17 MG/DL
BUN SERPL-MCNC: 17 MG/DL
BUN SERPL-MCNC: 17 MG/DL — SIGNIFICANT CHANGE UP (ref 7–23)
BUN SERPL-MCNC: 25 MG/DL — HIGH (ref 7–23)
BUN SERPL-MCNC: 28 MG/DL — HIGH (ref 7–23)
BUN SERPL-MCNC: 28 MG/DL — HIGH (ref 7–23)
BUN SERPL-MCNC: 8 MG/DL — SIGNIFICANT CHANGE UP (ref 7–23)
BUN SERPL-MCNC: 9 MG/DL — SIGNIFICANT CHANGE UP (ref 7–23)
C PNEUM DNA SPEC QL NAA+PROBE: SIGNIFICANT CHANGE UP
CALCIUM SERPL-MCNC: 7.9 MG/DL — LOW (ref 8.4–10.5)
CALCIUM SERPL-MCNC: 8 MG/DL — LOW (ref 8.4–10.5)
CALCIUM SERPL-MCNC: 8.2 MG/DL — LOW (ref 8.4–10.5)
CALCIUM SERPL-MCNC: 8.2 MG/DL — LOW (ref 8.4–10.5)
CALCIUM SERPL-MCNC: 8.3 MG/DL
CALCIUM SERPL-MCNC: 8.4 MG/DL — SIGNIFICANT CHANGE UP (ref 8.4–10.5)
CALCIUM SERPL-MCNC: 8.5 MG/DL
CALCIUM SERPL-MCNC: 8.5 MG/DL — SIGNIFICANT CHANGE UP (ref 8.4–10.5)
CALCIUM SERPL-MCNC: 8.6 MG/DL
CALCIUM SERPL-MCNC: 8.6 MG/DL
CALCIUM SERPL-MCNC: 8.8 MG/DL — SIGNIFICANT CHANGE UP (ref 8.4–10.5)
CALCIUM SERPL-MCNC: 8.9 MG/DL
CEA SERPL-MCNC: 4.8 NG/ML
CEA SERPL-MCNC: 5.8 NG/ML
CEA SERPL-MCNC: 7.9 NG/ML
CHLORIDE BLDV-SCNC: 107 MMOL/L — SIGNIFICANT CHANGE UP (ref 96–108)
CHLORIDE SERPL-SCNC: 104 MMOL/L — SIGNIFICANT CHANGE UP (ref 98–107)
CHLORIDE SERPL-SCNC: 105 MMOL/L
CHLORIDE SERPL-SCNC: 105 MMOL/L
CHLORIDE SERPL-SCNC: 106 MMOL/L
CHLORIDE SERPL-SCNC: 106 MMOL/L — SIGNIFICANT CHANGE UP (ref 98–107)
CHLORIDE SERPL-SCNC: 108 MMOL/L — HIGH (ref 98–107)
CHLORIDE SERPL-SCNC: 109 MMOL/L — HIGH (ref 98–107)
CHLORIDE SERPL-SCNC: 112 MMOL/L — HIGH (ref 98–107)
CHLORIDE SERPL-SCNC: 115 MMOL/L — HIGH (ref 98–107)
CHLORIDE SERPL-SCNC: 117 MMOL/L — HIGH (ref 98–107)
CK MB BLD-MCNC: 4 % — HIGH (ref 0–2.5)
CK MB CFR SERPL CALC: 3.8 NG/ML — SIGNIFICANT CHANGE UP
CK SERPL-CCNC: 94 U/L — SIGNIFICANT CHANGE UP (ref 25–170)
CO2 BLDV-SCNC: 29.3 MMOL/L — HIGH (ref 22–26)
CO2 SERPL-SCNC: 11 MMOL/L — LOW (ref 22–31)
CO2 SERPL-SCNC: 12 MMOL/L — LOW (ref 22–31)
CO2 SERPL-SCNC: 15 MMOL/L — LOW (ref 22–31)
CO2 SERPL-SCNC: 17 MMOL/L — LOW (ref 22–31)
CO2 SERPL-SCNC: 19 MMOL/L — LOW (ref 22–31)
CO2 SERPL-SCNC: 22 MMOL/L
CO2 SERPL-SCNC: 23 MMOL/L
CO2 SERPL-SCNC: 23 MMOL/L
CO2 SERPL-SCNC: 23 MMOL/L — SIGNIFICANT CHANGE UP (ref 22–31)
CO2 SERPL-SCNC: 23 MMOL/L — SIGNIFICANT CHANGE UP (ref 22–31)
CO2 SERPL-SCNC: 24 MMOL/L
CO2 SERPL-SCNC: 25 MMOL/L
COLOR SPEC: YELLOW — SIGNIFICANT CHANGE UP
CREAT ?TM UR-MCNC: 206 MG/DL — SIGNIFICANT CHANGE UP
CREAT SERPL-MCNC: 0.69 MG/DL — SIGNIFICANT CHANGE UP (ref 0.5–1.3)
CREAT SERPL-MCNC: 0.73 MG/DL — SIGNIFICANT CHANGE UP (ref 0.5–1.3)
CREAT SERPL-MCNC: 0.93 MG/DL — SIGNIFICANT CHANGE UP (ref 0.5–1.3)
CREAT SERPL-MCNC: 1.09 MG/DL
CREAT SERPL-MCNC: 1.14 MG/DL
CREAT SERPL-MCNC: 1.18 MG/DL — SIGNIFICANT CHANGE UP (ref 0.5–1.3)
CREAT SERPL-MCNC: 1.19 MG/DL
CREAT SERPL-MCNC: 1.31 MG/DL — HIGH (ref 0.5–1.3)
CREAT SERPL-MCNC: 1.34 MG/DL
CREAT SERPL-MCNC: 1.34 MG/DL
CREAT SERPL-MCNC: 1.35 MG/DL — HIGH (ref 0.5–1.3)
CREAT SERPL-MCNC: 1.36 MG/DL — HIGH (ref 0.5–1.3)
DIFF PNL FLD: ABNORMAL
EOSINOPHIL # BLD AUTO: 0 K/UL — SIGNIFICANT CHANGE UP (ref 0–0.5)
EOSINOPHIL # BLD AUTO: 0.01 K/UL — SIGNIFICANT CHANGE UP (ref 0–0.5)
EOSINOPHIL # BLD AUTO: 0.02 K/UL — SIGNIFICANT CHANGE UP (ref 0–0.5)
EOSINOPHIL # BLD AUTO: 0.03 K/UL — SIGNIFICANT CHANGE UP (ref 0–0.5)
EOSINOPHIL # BLD AUTO: 0.04 K/UL — SIGNIFICANT CHANGE UP (ref 0–0.5)
EOSINOPHIL # BLD AUTO: 0.05 K/UL
EOSINOPHIL # BLD AUTO: 0.06 K/UL
EOSINOPHIL # BLD AUTO: 0.06 K/UL — SIGNIFICANT CHANGE UP (ref 0–0.5)
EOSINOPHIL # BLD AUTO: 0.08 K/UL
EOSINOPHIL NFR BLD AUTO: 0 % — SIGNIFICANT CHANGE UP (ref 0–6)
EOSINOPHIL NFR BLD AUTO: 0.2 % — SIGNIFICANT CHANGE UP (ref 0–6)
EOSINOPHIL NFR BLD AUTO: 0.3 % — SIGNIFICANT CHANGE UP (ref 0–6)
EOSINOPHIL NFR BLD AUTO: 0.3 % — SIGNIFICANT CHANGE UP (ref 0–6)
EOSINOPHIL NFR BLD AUTO: 0.4 % — SIGNIFICANT CHANGE UP (ref 0–6)
EOSINOPHIL NFR BLD AUTO: 0.5 % — SIGNIFICANT CHANGE UP (ref 0–6)
EOSINOPHIL NFR BLD AUTO: 0.5 % — SIGNIFICANT CHANGE UP (ref 0–6)
EOSINOPHIL NFR BLD AUTO: 0.7 % — SIGNIFICANT CHANGE UP (ref 0–6)
EOSINOPHIL NFR BLD AUTO: 0.8 %
EOSINOPHIL NFR BLD AUTO: 0.9 %
EOSINOPHIL NFR BLD AUTO: 1 %
FLUAV SUBTYP SPEC NAA+PROBE: SIGNIFICANT CHANGE UP
FLUBV RNA SPEC QL NAA+PROBE: SIGNIFICANT CHANGE UP
GAS PNL BLDV: 143 MMOL/L — SIGNIFICANT CHANGE UP (ref 136–145)
GLUCOSE BLDV-MCNC: 116 MG/DL — HIGH (ref 70–99)
GLUCOSE SERPL-MCNC: 105 MG/DL — HIGH (ref 70–99)
GLUCOSE SERPL-MCNC: 106 MG/DL — HIGH (ref 70–99)
GLUCOSE SERPL-MCNC: 141 MG/DL
GLUCOSE SERPL-MCNC: 149 MG/DL
GLUCOSE SERPL-MCNC: 62 MG/DL — LOW (ref 70–99)
GLUCOSE SERPL-MCNC: 73 MG/DL
GLUCOSE SERPL-MCNC: 74 MG/DL — SIGNIFICANT CHANGE UP (ref 70–99)
GLUCOSE SERPL-MCNC: 82 MG/DL
GLUCOSE SERPL-MCNC: 82 MG/DL — SIGNIFICANT CHANGE UP (ref 70–99)
GLUCOSE SERPL-MCNC: 88 MG/DL — SIGNIFICANT CHANGE UP (ref 70–99)
GLUCOSE SERPL-MCNC: 91 MG/DL
GLUCOSE SERPL-MCNC: 93 MG/DL — SIGNIFICANT CHANGE UP (ref 70–99)
GLUCOSE UR QL: NEGATIVE — SIGNIFICANT CHANGE UP
HADV DNA SPEC QL NAA+PROBE: SIGNIFICANT CHANGE UP
HCO3 BLDV-SCNC: 28 MMOL/L — SIGNIFICANT CHANGE UP (ref 22–29)
HCOV 229E RNA SPEC QL NAA+PROBE: SIGNIFICANT CHANGE UP
HCOV HKU1 RNA SPEC QL NAA+PROBE: SIGNIFICANT CHANGE UP
HCOV NL63 RNA SPEC QL NAA+PROBE: SIGNIFICANT CHANGE UP
HCOV OC43 RNA SPEC QL NAA+PROBE: SIGNIFICANT CHANGE UP
HCT VFR BLD CALC: 29.7 % — LOW (ref 34.5–45)
HCT VFR BLD CALC: 29.9 % — LOW (ref 34.5–45)
HCT VFR BLD CALC: 30.2 % — LOW (ref 34.5–45)
HCT VFR BLD CALC: 31.2 % — LOW (ref 34.5–45)
HCT VFR BLD CALC: 32 % — LOW (ref 34.5–45)
HCT VFR BLD CALC: 32.2 % — LOW (ref 34.5–45)
HCT VFR BLD CALC: 34.3 % — LOW (ref 34.5–45)
HCT VFR BLD CALC: 34.4 % — LOW (ref 34.5–45)
HCT VFR BLD CALC: 34.9 % — SIGNIFICANT CHANGE UP (ref 34.5–45)
HCT VFR BLD CALC: 35.9 % — SIGNIFICANT CHANGE UP (ref 34.5–45)
HCT VFR BLD CALC: 36.1 %
HCT VFR BLD CALC: 36.2 % — SIGNIFICANT CHANGE UP (ref 34.5–45)
HCT VFR BLD CALC: 36.4 %
HCT VFR BLD CALC: 37.2 %
HCT VFR BLD CALC: 37.4 % — SIGNIFICANT CHANGE UP (ref 34.5–45)
HCT VFR BLD CALC: 41.8 % — SIGNIFICANT CHANGE UP (ref 34.5–45)
HCT VFR BLD CALC: 42.8 % — SIGNIFICANT CHANGE UP (ref 34.5–45)
HCT VFR BLDA CALC: 35 % — SIGNIFICANT CHANGE UP (ref 34.5–46.5)
HGB BLD CALC-MCNC: 11.5 G/DL — SIGNIFICANT CHANGE UP (ref 11.5–15.5)
HGB BLD-MCNC: 10.6 G/DL — LOW (ref 11.5–15.5)
HGB BLD-MCNC: 11 G/DL — LOW (ref 11.5–15.5)
HGB BLD-MCNC: 11.1 G/DL — LOW (ref 11.5–15.5)
HGB BLD-MCNC: 11.2 G/DL — LOW (ref 11.5–15.5)
HGB BLD-MCNC: 11.4 G/DL
HGB BLD-MCNC: 11.4 G/DL
HGB BLD-MCNC: 11.4 G/DL — LOW (ref 11.5–15.5)
HGB BLD-MCNC: 11.6 G/DL — SIGNIFICANT CHANGE UP (ref 11.5–15.5)
HGB BLD-MCNC: 11.9 G/DL — SIGNIFICANT CHANGE UP (ref 11.5–15.5)
HGB BLD-MCNC: 12.3 G/DL
HGB BLD-MCNC: 12.8 G/DL — SIGNIFICANT CHANGE UP (ref 11.5–15.5)
HGB BLD-MCNC: 12.8 G/DL — SIGNIFICANT CHANGE UP (ref 11.5–15.5)
HGB BLD-MCNC: 9.8 G/DL — LOW (ref 11.5–15.5)
HGB BLD-MCNC: 9.9 G/DL — LOW (ref 11.5–15.5)
HGB BLD-MCNC: 9.9 G/DL — LOW (ref 11.5–15.5)
HMPV RNA SPEC QL NAA+PROBE: SIGNIFICANT CHANGE UP
HPIV1 RNA SPEC QL NAA+PROBE: SIGNIFICANT CHANGE UP
HPIV2 RNA SPEC QL NAA+PROBE: SIGNIFICANT CHANGE UP
HPIV3 RNA SPEC QL NAA+PROBE: SIGNIFICANT CHANGE UP
HPIV4 RNA SPEC QL NAA+PROBE: SIGNIFICANT CHANGE UP
IANC: 7.41 K/UL — SIGNIFICANT CHANGE UP (ref 1.5–8.5)
IMM GRANULOCYTES NFR BLD AUTO: 0.1 %
IMM GRANULOCYTES NFR BLD AUTO: 0.1 % — SIGNIFICANT CHANGE UP (ref 0–1.5)
IMM GRANULOCYTES NFR BLD AUTO: 0.2 %
IMM GRANULOCYTES NFR BLD AUTO: 0.3 %
IMM GRANULOCYTES NFR BLD AUTO: 0.3 % — SIGNIFICANT CHANGE UP (ref 0–1.5)
IMM GRANULOCYTES NFR BLD AUTO: 0.5 % — SIGNIFICANT CHANGE UP (ref 0–1.5)
IMM GRANULOCYTES NFR BLD AUTO: 0.6 % — SIGNIFICANT CHANGE UP (ref 0–1.5)
IMM GRANULOCYTES NFR BLD AUTO: 0.7 % — SIGNIFICANT CHANGE UP (ref 0–1.5)
IMM GRANULOCYTES NFR BLD AUTO: 1.1 % — SIGNIFICANT CHANGE UP (ref 0–1.5)
IMM GRANULOCYTES NFR BLD AUTO: 3.3 % — HIGH (ref 0–1.5)
INR PPP: 0.95 RATIO
KETONES UR-MCNC: ABNORMAL
LACTATE BLDV-MCNC: 3.4 MMOL/L — HIGH (ref 0.5–2)
LDH SERPL-CCNC: 164 U/L
LDH SERPL-CCNC: 170 U/L
LDH SERPL-CCNC: 173 U/L
LEUKOCYTE ESTERASE UR-ACNC: ABNORMAL
LIDOCAIN IGE QN: 66 U/L — HIGH (ref 7–60)
LPL SERPL-CCNC: 51 U/L
LYMPHOCYTES # BLD AUTO: 0.8 K/UL — LOW (ref 1–3.3)
LYMPHOCYTES # BLD AUTO: 0.93 K/UL — LOW (ref 1–3.3)
LYMPHOCYTES # BLD AUTO: 1 K/UL — SIGNIFICANT CHANGE UP (ref 1–3.3)
LYMPHOCYTES # BLD AUTO: 1.05 K/UL — SIGNIFICANT CHANGE UP (ref 1–3.3)
LYMPHOCYTES # BLD AUTO: 1.11 K/UL — SIGNIFICANT CHANGE UP (ref 1–3.3)
LYMPHOCYTES # BLD AUTO: 1.11 K/UL — SIGNIFICANT CHANGE UP (ref 1–3.3)
LYMPHOCYTES # BLD AUTO: 1.18 K/UL — SIGNIFICANT CHANGE UP (ref 1–3.3)
LYMPHOCYTES # BLD AUTO: 1.24 K/UL — SIGNIFICANT CHANGE UP (ref 1–3.3)
LYMPHOCYTES # BLD AUTO: 1.35 K/UL
LYMPHOCYTES # BLD AUTO: 1.52 K/UL — SIGNIFICANT CHANGE UP (ref 1–3.3)
LYMPHOCYTES # BLD AUTO: 1.57 K/UL
LYMPHOCYTES # BLD AUTO: 11.5 % — LOW (ref 13–44)
LYMPHOCYTES # BLD AUTO: 13.8 % — SIGNIFICANT CHANGE UP (ref 13–44)
LYMPHOCYTES # BLD AUTO: 13.9 % — SIGNIFICANT CHANGE UP (ref 13–44)
LYMPHOCYTES # BLD AUTO: 16 % — SIGNIFICANT CHANGE UP (ref 13–44)
LYMPHOCYTES # BLD AUTO: 16.2 % — SIGNIFICANT CHANGE UP (ref 13–44)
LYMPHOCYTES # BLD AUTO: 17.2 % — SIGNIFICANT CHANGE UP (ref 13–44)
LYMPHOCYTES # BLD AUTO: 2.46 K/UL — SIGNIFICANT CHANGE UP (ref 1–3.3)
LYMPHOCYTES # BLD AUTO: 2.53 K/UL
LYMPHOCYTES # BLD AUTO: 23.1 % — SIGNIFICANT CHANGE UP (ref 13–44)
LYMPHOCYTES # BLD AUTO: 31 % — SIGNIFICANT CHANGE UP (ref 13–44)
LYMPHOCYTES # BLD AUTO: 32 % — SIGNIFICANT CHANGE UP (ref 13–44)
LYMPHOCYTES # BLD AUTO: 47 % — HIGH (ref 13–44)
LYMPHOCYTES NFR BLD AUTO: 20.9 %
LYMPHOCYTES NFR BLD AUTO: 23 %
LYMPHOCYTES NFR BLD AUTO: 32.4 %
M PNEUMO DNA SPEC QL NAA+PROBE: SIGNIFICANT CHANGE UP
MAGNESIUM SERPL-MCNC: 2.2 MG/DL — SIGNIFICANT CHANGE UP (ref 1.6–2.6)
MAGNESIUM SERPL-MCNC: 2.3 MG/DL — SIGNIFICANT CHANGE UP (ref 1.6–2.6)
MAGNESIUM SERPL-MCNC: 2.4 MG/DL — SIGNIFICANT CHANGE UP (ref 1.6–2.6)
MAN DIFF?: NORMAL
MCHC RBC-ENTMCNC: 29 PG — SIGNIFICANT CHANGE UP (ref 27–34)
MCHC RBC-ENTMCNC: 29.1 PG — SIGNIFICANT CHANGE UP (ref 27–34)
MCHC RBC-ENTMCNC: 29.4 PG — SIGNIFICANT CHANGE UP (ref 27–34)
MCHC RBC-ENTMCNC: 29.4 PG — SIGNIFICANT CHANGE UP (ref 27–34)
MCHC RBC-ENTMCNC: 29.7 PG — SIGNIFICANT CHANGE UP (ref 27–34)
MCHC RBC-ENTMCNC: 29.8 PG — SIGNIFICANT CHANGE UP (ref 27–34)
MCHC RBC-ENTMCNC: 29.9 GM/DL — LOW (ref 32–36)
MCHC RBC-ENTMCNC: 30 PG — SIGNIFICANT CHANGE UP (ref 27–34)
MCHC RBC-ENTMCNC: 30.1 PG
MCHC RBC-ENTMCNC: 30.1 PG — SIGNIFICANT CHANGE UP (ref 27–34)
MCHC RBC-ENTMCNC: 30.2 PG — SIGNIFICANT CHANGE UP (ref 27–34)
MCHC RBC-ENTMCNC: 30.3 PG
MCHC RBC-ENTMCNC: 30.3 PG — SIGNIFICANT CHANGE UP (ref 27–34)
MCHC RBC-ENTMCNC: 30.3 PG — SIGNIFICANT CHANGE UP (ref 27–34)
MCHC RBC-ENTMCNC: 30.6 GM/DL — LOW (ref 32–36)
MCHC RBC-ENTMCNC: 30.7 PG
MCHC RBC-ENTMCNC: 31.3 GM/DL
MCHC RBC-ENTMCNC: 31.6 GM/DL
MCHC RBC-ENTMCNC: 31.8 G/DL — LOW (ref 32–36)
MCHC RBC-ENTMCNC: 31.8 GM/DL — LOW (ref 32–36)
MCHC RBC-ENTMCNC: 32 GM/DL — SIGNIFICANT CHANGE UP (ref 32–36)
MCHC RBC-ENTMCNC: 32.1 G/DL — SIGNIFICANT CHANGE UP (ref 32–36)
MCHC RBC-ENTMCNC: 32.1 GM/DL — SIGNIFICANT CHANGE UP (ref 32–36)
MCHC RBC-ENTMCNC: 32.3 G/DL — SIGNIFICANT CHANGE UP (ref 32–36)
MCHC RBC-ENTMCNC: 32.5 G/DL — SIGNIFICANT CHANGE UP (ref 32–36)
MCHC RBC-ENTMCNC: 32.9 G/DL — SIGNIFICANT CHANGE UP (ref 32–36)
MCHC RBC-ENTMCNC: 33.1 G/DL — SIGNIFICANT CHANGE UP (ref 32–36)
MCHC RBC-ENTMCNC: 33.1 G/DL — SIGNIFICANT CHANGE UP (ref 32–36)
MCHC RBC-ENTMCNC: 33.1 GM/DL
MCHC RBC-ENTMCNC: 33.3 G/DL — SIGNIFICANT CHANGE UP (ref 32–36)
MCHC RBC-ENTMCNC: 34 G/DL — SIGNIFICANT CHANGE UP (ref 32–36)
MCV RBC AUTO: 101.2 FL — HIGH (ref 80–100)
MCV RBC AUTO: 87.4 FL — SIGNIFICANT CHANGE UP (ref 80–100)
MCV RBC AUTO: 88.5 FL — SIGNIFICANT CHANGE UP (ref 80–100)
MCV RBC AUTO: 88.7 FL — SIGNIFICANT CHANGE UP (ref 80–100)
MCV RBC AUTO: 88.9 FL — SIGNIFICANT CHANGE UP (ref 80–100)
MCV RBC AUTO: 89.3 FL — SIGNIFICANT CHANGE UP (ref 80–100)
MCV RBC AUTO: 90.5 FL — SIGNIFICANT CHANGE UP (ref 80–100)
MCV RBC AUTO: 92 FL — SIGNIFICANT CHANGE UP (ref 80–100)
MCV RBC AUTO: 92.6 FL — SIGNIFICANT CHANGE UP (ref 80–100)
MCV RBC AUTO: 92.7 FL — SIGNIFICANT CHANGE UP (ref 80–100)
MCV RBC AUTO: 92.8 FL
MCV RBC AUTO: 93.7 FL — SIGNIFICANT CHANGE UP (ref 80–100)
MCV RBC AUTO: 94.5 FL — SIGNIFICANT CHANGE UP (ref 80–100)
MCV RBC AUTO: 94.7 FL — SIGNIFICANT CHANGE UP (ref 80–100)
MCV RBC AUTO: 96 FL
MCV RBC AUTO: 96 FL
MCV RBC AUTO: 98.1 FL — SIGNIFICANT CHANGE UP (ref 80–100)
MONOCYTES # BLD AUTO: 0.13 K/UL — SIGNIFICANT CHANGE UP (ref 0–0.9)
MONOCYTES # BLD AUTO: 0.19 K/UL — SIGNIFICANT CHANGE UP (ref 0–0.9)
MONOCYTES # BLD AUTO: 0.33 K/UL
MONOCYTES # BLD AUTO: 0.39 K/UL
MONOCYTES # BLD AUTO: 0.54 K/UL
MONOCYTES # BLD AUTO: 0.56 K/UL — SIGNIFICANT CHANGE UP (ref 0–0.9)
MONOCYTES # BLD AUTO: 0.56 K/UL — SIGNIFICANT CHANGE UP (ref 0–0.9)
MONOCYTES # BLD AUTO: 0.68 K/UL — SIGNIFICANT CHANGE UP (ref 0–0.9)
MONOCYTES # BLD AUTO: 0.68 K/UL — SIGNIFICANT CHANGE UP (ref 0–0.9)
MONOCYTES # BLD AUTO: 0.71 K/UL — SIGNIFICANT CHANGE UP (ref 0–0.9)
MONOCYTES # BLD AUTO: 0.74 K/UL — SIGNIFICANT CHANGE UP (ref 0–0.9)
MONOCYTES # BLD AUTO: 0.75 K/UL — SIGNIFICANT CHANGE UP (ref 0–0.9)
MONOCYTES # BLD AUTO: 0.9 K/UL — SIGNIFICANT CHANGE UP (ref 0–0.9)
MONOCYTES NFR BLD AUTO: 11.2 % — SIGNIFICANT CHANGE UP (ref 2–14)
MONOCYTES NFR BLD AUTO: 12.9 % — SIGNIFICANT CHANGE UP (ref 2–14)
MONOCYTES NFR BLD AUTO: 19 % — HIGH (ref 2–14)
MONOCYTES NFR BLD AUTO: 4 % — SIGNIFICANT CHANGE UP (ref 2–14)
MONOCYTES NFR BLD AUTO: 4.8 %
MONOCYTES NFR BLD AUTO: 5 % — SIGNIFICANT CHANGE UP (ref 2–14)
MONOCYTES NFR BLD AUTO: 6 %
MONOCYTES NFR BLD AUTO: 6.4 % — SIGNIFICANT CHANGE UP (ref 2–14)
MONOCYTES NFR BLD AUTO: 6.9 %
MONOCYTES NFR BLD AUTO: 8.5 % — SIGNIFICANT CHANGE UP (ref 2–14)
MONOCYTES NFR BLD AUTO: 8.6 % — SIGNIFICANT CHANGE UP (ref 2–14)
MONOCYTES NFR BLD AUTO: 9.6 % — SIGNIFICANT CHANGE UP (ref 2–14)
MONOCYTES NFR BLD AUTO: 9.8 % — SIGNIFICANT CHANGE UP (ref 2–14)
NEUTROPHILS # BLD AUTO: 1.21 K/UL — LOW (ref 1.8–7.4)
NEUTROPHILS # BLD AUTO: 1.79 K/UL — LOW (ref 1.8–7.4)
NEUTROPHILS # BLD AUTO: 3.05 K/UL — SIGNIFICANT CHANGE UP (ref 1.8–7.4)
NEUTROPHILS # BLD AUTO: 4.04 K/UL — SIGNIFICANT CHANGE UP (ref 1.8–7.4)
NEUTROPHILS # BLD AUTO: 4.28 K/UL — SIGNIFICANT CHANGE UP (ref 1.8–7.4)
NEUTROPHILS # BLD AUTO: 4.6 K/UL
NEUTROPHILS # BLD AUTO: 4.61 K/UL
NEUTROPHILS # BLD AUTO: 4.77 K/UL — SIGNIFICANT CHANGE UP (ref 1.8–7.4)
NEUTROPHILS # BLD AUTO: 4.81 K/UL
NEUTROPHILS # BLD AUTO: 5.21 K/UL — SIGNIFICANT CHANGE UP (ref 1.8–7.4)
NEUTROPHILS # BLD AUTO: 5.83 K/UL — SIGNIFICANT CHANGE UP (ref 1.8–7.4)
NEUTROPHILS # BLD AUTO: 6.86 K/UL — SIGNIFICANT CHANGE UP (ref 1.8–7.4)
NEUTROPHILS # BLD AUTO: 7.41 K/UL — HIGH (ref 1.8–7.4)
NEUTROPHILS NFR BLD AUTO: 48 % — SIGNIFICANT CHANGE UP (ref 43–77)
NEUTROPHILS NFR BLD AUTO: 50 % — SIGNIFICANT CHANGE UP (ref 43–77)
NEUTROPHILS NFR BLD AUTO: 58.8 %
NEUTROPHILS NFR BLD AUTO: 64 % — SIGNIFICANT CHANGE UP (ref 43–77)
NEUTROPHILS NFR BLD AUTO: 69.5 % — SIGNIFICANT CHANGE UP (ref 43–77)
NEUTROPHILS NFR BLD AUTO: 69.6 % — SIGNIFICANT CHANGE UP (ref 43–77)
NEUTROPHILS NFR BLD AUTO: 70.6 %
NEUTROPHILS NFR BLD AUTO: 71.3 %
NEUTROPHILS NFR BLD AUTO: 72.2 % — SIGNIFICANT CHANGE UP (ref 43–77)
NEUTROPHILS NFR BLD AUTO: 72.9 % — SIGNIFICANT CHANGE UP (ref 43–77)
NEUTROPHILS NFR BLD AUTO: 73.5 % — SIGNIFICANT CHANGE UP (ref 43–77)
NEUTROPHILS NFR BLD AUTO: 73.5 % — SIGNIFICANT CHANGE UP (ref 43–77)
NEUTROPHILS NFR BLD AUTO: 78.6 % — HIGH (ref 43–77)
NITRITE UR-MCNC: NEGATIVE — SIGNIFICANT CHANGE UP
NRBC # BLD: 0 /100 WBCS — SIGNIFICANT CHANGE UP
NRBC # BLD: 0 /100 WBCS — SIGNIFICANT CHANGE UP (ref 0–0)
NRBC # BLD: 0 /100 — SIGNIFICANT CHANGE UP (ref 0–0)
NRBC # BLD: 0 /100 — SIGNIFICANT CHANGE UP (ref 0–0)
NRBC # BLD: 1 /100 WBCS — SIGNIFICANT CHANGE UP
NRBC # BLD: 2 /100 — HIGH (ref 0–0)
NRBC # BLD: 3 /100 WBCS — SIGNIFICANT CHANGE UP
NRBC # BLD: 6 /100 WBCS — HIGH (ref 0–0)
NRBC # BLD: SIGNIFICANT CHANGE UP /100 WBCS (ref 0–0)
NRBC # FLD: 0 K/UL — SIGNIFICANT CHANGE UP
NRBC # FLD: 0 K/UL — SIGNIFICANT CHANGE UP
NRBC # FLD: 0.04 K/UL — HIGH
NRBC # FLD: 0.19 K/UL — HIGH
NRBC # FLD: 0.47 K/UL — HIGH
PCO2 BLDV: 45 MMHG — HIGH (ref 39–42)
PH BLDV: 7.4 — SIGNIFICANT CHANGE UP (ref 7.32–7.43)
PH UR: 6 — SIGNIFICANT CHANGE UP (ref 5–8)
PHOSPHATE SERPL-MCNC: 2.4 MG/DL — LOW (ref 2.5–4.5)
PHOSPHATE SERPL-MCNC: 3.1 MG/DL — SIGNIFICANT CHANGE UP (ref 2.5–4.5)
PHOSPHATE SERPL-MCNC: 3.9 MG/DL — SIGNIFICANT CHANGE UP (ref 2.5–4.5)
PHOSPHATE SERPL-MCNC: 4.3 MG/DL — SIGNIFICANT CHANGE UP (ref 2.5–4.5)
PHOSPHATE SERPL-MCNC: 4.4 MG/DL — SIGNIFICANT CHANGE UP (ref 2.5–4.5)
PLAT MORPH BLD: NORMAL — SIGNIFICANT CHANGE UP
PLATELET # BLD AUTO: 163 K/UL — SIGNIFICANT CHANGE UP (ref 150–400)
PLATELET # BLD AUTO: 188 K/UL — SIGNIFICANT CHANGE UP (ref 150–400)
PLATELET # BLD AUTO: 189 K/UL
PLATELET # BLD AUTO: 195 K/UL — SIGNIFICANT CHANGE UP (ref 150–400)
PLATELET # BLD AUTO: 202 K/UL
PLATELET # BLD AUTO: 205 K/UL
PLATELET # BLD AUTO: 214 K/UL — SIGNIFICANT CHANGE UP (ref 150–400)
PLATELET # BLD AUTO: 220 K/UL — SIGNIFICANT CHANGE UP (ref 150–400)
PLATELET # BLD AUTO: 230 K/UL — SIGNIFICANT CHANGE UP (ref 150–400)
PLATELET # BLD AUTO: 244 K/UL — SIGNIFICANT CHANGE UP (ref 150–400)
PLATELET # BLD AUTO: 257 K/UL — SIGNIFICANT CHANGE UP (ref 150–400)
PLATELET # BLD AUTO: 267 K/UL — SIGNIFICANT CHANGE UP (ref 150–400)
PLATELET # BLD AUTO: 293 K/UL — SIGNIFICANT CHANGE UP (ref 150–400)
PLATELET # BLD AUTO: 311 K/UL — SIGNIFICANT CHANGE UP (ref 150–400)
PLATELET # BLD AUTO: 316 K/UL — SIGNIFICANT CHANGE UP (ref 150–400)
PLATELET # BLD AUTO: 323 K/UL — SIGNIFICANT CHANGE UP (ref 150–400)
PLATELET # BLD AUTO: 417 K/UL — HIGH (ref 150–400)
PO2 BLDV: 34 MMHG — SIGNIFICANT CHANGE UP
POTASSIUM BLDV-SCNC: 2.4 MMOL/L — CRITICAL LOW (ref 3.5–5.1)
POTASSIUM SERPL-MCNC: 2.7 MMOL/L — CRITICAL LOW (ref 3.5–5.3)
POTASSIUM SERPL-MCNC: 3.2 MMOL/L — LOW (ref 3.5–5.3)
POTASSIUM SERPL-MCNC: 3.6 MMOL/L — SIGNIFICANT CHANGE UP (ref 3.5–5.3)
POTASSIUM SERPL-MCNC: 3.7 MMOL/L — SIGNIFICANT CHANGE UP (ref 3.5–5.3)
POTASSIUM SERPL-MCNC: 4.3 MMOL/L — SIGNIFICANT CHANGE UP (ref 3.5–5.3)
POTASSIUM SERPL-MCNC: 4.7 MMOL/L — SIGNIFICANT CHANGE UP (ref 3.5–5.3)
POTASSIUM SERPL-MCNC: 5.7 MMOL/L — HIGH (ref 3.5–5.3)
POTASSIUM SERPL-SCNC: 2.7 MMOL/L — CRITICAL LOW (ref 3.5–5.3)
POTASSIUM SERPL-SCNC: 3.2 MMOL/L — LOW (ref 3.5–5.3)
POTASSIUM SERPL-SCNC: 3.6 MMOL/L — SIGNIFICANT CHANGE UP (ref 3.5–5.3)
POTASSIUM SERPL-SCNC: 3.7 MMOL/L — SIGNIFICANT CHANGE UP (ref 3.5–5.3)
POTASSIUM SERPL-SCNC: 4.3 MMOL/L
POTASSIUM SERPL-SCNC: 4.3 MMOL/L — SIGNIFICANT CHANGE UP (ref 3.5–5.3)
POTASSIUM SERPL-SCNC: 4.5 MMOL/L
POTASSIUM SERPL-SCNC: 4.5 MMOL/L
POTASSIUM SERPL-SCNC: 4.7 MMOL/L — SIGNIFICANT CHANGE UP (ref 3.5–5.3)
POTASSIUM SERPL-SCNC: 4.8 MMOL/L
POTASSIUM SERPL-SCNC: 4.9 MMOL/L
POTASSIUM SERPL-SCNC: 5.7 MMOL/L — HIGH (ref 3.5–5.3)
PROT SERPL-MCNC: 5.4 G/DL — LOW (ref 6–8.3)
PROT SERPL-MCNC: 5.4 G/DL — LOW (ref 6–8.3)
PROT SERPL-MCNC: 5.5 G/DL — LOW (ref 6–8.3)
PROT SERPL-MCNC: 6.2 G/DL — SIGNIFICANT CHANGE UP (ref 6–8.3)
PROT SERPL-MCNC: 6.3 G/DL
PROT SERPL-MCNC: 6.4 G/DL
PROT SERPL-MCNC: 6.4 G/DL
PROT SERPL-MCNC: 6.6 G/DL
PROT SERPL-MCNC: 7.2 G/DL
PROT SERPL-MCNC: 7.4 G/DL — SIGNIFICANT CHANGE UP (ref 6–8.3)
PROT UR-MCNC: ABNORMAL
PT BLD: 11.3 SEC
RAPID RVP RESULT: SIGNIFICANT CHANGE UP
RBC # BLD: 3.28 M/UL — LOW (ref 3.8–5.2)
RBC # BLD: 3.37 M/UL — LOW (ref 3.8–5.2)
RBC # BLD: 3.38 M/UL — LOW (ref 3.8–5.2)
RBC # BLD: 3.57 M/UL — LOW (ref 3.8–5.2)
RBC # BLD: 3.6 M/UL — LOW (ref 3.8–5.2)
RBC # BLD: 3.64 M/UL — LOW (ref 3.8–5.2)
RBC # BLD: 3.7 M/UL — LOW (ref 3.8–5.2)
RBC # BLD: 3.74 M/UL — LOW (ref 3.8–5.2)
RBC # BLD: 3.76 M/UL
RBC # BLD: 3.77 M/UL — LOW (ref 3.8–5.2)
RBC # BLD: 3.79 M/UL
RBC # BLD: 3.79 M/UL — LOW (ref 3.8–5.2)
RBC # BLD: 3.83 M/UL — SIGNIFICANT CHANGE UP (ref 3.8–5.2)
RBC # BLD: 3.99 M/UL — SIGNIFICANT CHANGE UP (ref 3.8–5.2)
RBC # BLD: 4.01 M/UL
RBC # BLD: 4.23 M/UL — SIGNIFICANT CHANGE UP (ref 3.8–5.2)
RBC # BLD: 4.26 M/UL — SIGNIFICANT CHANGE UP (ref 3.8–5.2)
RBC # FLD: 13.3 % — SIGNIFICANT CHANGE UP (ref 10.3–14.5)
RBC # FLD: 13.5 % — SIGNIFICANT CHANGE UP (ref 10.3–14.5)
RBC # FLD: 13.9 % — SIGNIFICANT CHANGE UP (ref 10.3–14.5)
RBC # FLD: 14 %
RBC # FLD: 14.1 %
RBC # FLD: 14.4 %
RBC # FLD: 14.7 % — HIGH (ref 10.3–14.5)
RBC # FLD: 14.9 % — HIGH (ref 10.3–14.5)
RBC # FLD: 17.2 % — HIGH (ref 10.3–14.5)
RBC # FLD: 17.6 % — HIGH (ref 10.3–14.5)
RBC # FLD: 19.5 % — HIGH (ref 10.3–14.5)
RBC # FLD: 19.6 % — HIGH (ref 10.3–14.5)
RBC # FLD: 20.3 % — HIGH (ref 10.3–14.5)
RBC # FLD: 21.6 % — HIGH (ref 10.3–14.5)
RBC # FLD: 21.9 % — HIGH (ref 10.3–14.5)
RBC # FLD: 23.1 % — HIGH (ref 10.3–14.5)
RBC # FLD: 23.3 % — HIGH (ref 10.3–14.5)
RBC BLD AUTO: SIGNIFICANT CHANGE UP
RSV RNA SPEC QL NAA+PROBE: SIGNIFICANT CHANGE UP
RV+EV RNA SPEC QL NAA+PROBE: SIGNIFICANT CHANGE UP
SAO2 % BLDV: 53.2 % — SIGNIFICANT CHANGE UP
SARS-COV-2 N GENE NPH QL NAA+PROBE: NOT DETECTED
SARS-COV-2 RNA SPEC QL NAA+PROBE: SIGNIFICANT CHANGE UP
SODIUM SERPL-SCNC: 137 MMOL/L
SODIUM SERPL-SCNC: 137 MMOL/L
SODIUM SERPL-SCNC: 138 MMOL/L — SIGNIFICANT CHANGE UP (ref 135–145)
SODIUM SERPL-SCNC: 139 MMOL/L
SODIUM SERPL-SCNC: 140 MMOL/L
SODIUM SERPL-SCNC: 140 MMOL/L
SODIUM SERPL-SCNC: 142 MMOL/L — SIGNIFICANT CHANGE UP (ref 135–145)
SODIUM SERPL-SCNC: 142 MMOL/L — SIGNIFICANT CHANGE UP (ref 135–145)
SODIUM SERPL-SCNC: 143 MMOL/L — SIGNIFICANT CHANGE UP (ref 135–145)
SODIUM SERPL-SCNC: 144 MMOL/L — SIGNIFICANT CHANGE UP (ref 135–145)
SP GR SPEC: 1.03 — SIGNIFICANT CHANGE UP (ref 1–1.05)
SURGICAL PATHOLOGY STUDY: SIGNIFICANT CHANGE UP
T4 AB SER-ACNC: 4.11 UG/DL — LOW (ref 5.1–13)
T4 FREE SERPL-MCNC: 1.2 NG/DL
T4 FREE SERPL-MCNC: 1.3 NG/DL
TROPONIN T, HIGH SENSITIVITY RESULT: 23 NG/L — SIGNIFICANT CHANGE UP
TSH SERPL-ACNC: 4.04 UIU/ML
TSH SERPL-ACNC: 6.25 UIU/ML
TSH SERPL-ACNC: 9.42 UIU/ML
TSH SERPL-MCNC: 3.65 UIU/ML — SIGNIFICANT CHANGE UP (ref 0.27–4.2)
UROBILINOGEN FLD QL: ABNORMAL
UUN UR-MCNC: 403.2 MG/DL — SIGNIFICANT CHANGE UP
WBC # BLD: 10.65 K/UL — HIGH (ref 3.8–10.5)
WBC # BLD: 15.07 K/UL — HIGH (ref 3.8–10.5)
WBC # BLD: 18.75 K/UL — HIGH (ref 3.8–10.5)
WBC # BLD: 19.99 K/UL — HIGH (ref 3.8–10.5)
WBC # BLD: 2.52 K/UL — LOW (ref 3.8–10.5)
WBC # BLD: 3.58 K/UL — LOW (ref 3.8–10.5)
WBC # BLD: 4.76 K/UL — SIGNIFICANT CHANGE UP (ref 3.8–10.5)
WBC # BLD: 5.8 K/UL — SIGNIFICANT CHANGE UP (ref 3.8–10.5)
WBC # BLD: 5.83 K/UL — SIGNIFICANT CHANGE UP (ref 3.8–10.5)
WBC # BLD: 6.49 K/UL — SIGNIFICANT CHANGE UP (ref 3.8–10.5)
WBC # BLD: 7.22 K/UL — SIGNIFICANT CHANGE UP (ref 3.8–10.5)
WBC # BLD: 8.01 K/UL — SIGNIFICANT CHANGE UP (ref 3.8–10.5)
WBC # BLD: 8.72 K/UL — SIGNIFICANT CHANGE UP (ref 3.8–10.5)
WBC # BLD: 9.98 K/UL — SIGNIFICANT CHANGE UP (ref 3.8–10.5)
WBC # FLD AUTO: 10.65 K/UL — HIGH (ref 3.8–10.5)
WBC # FLD AUTO: 15.07 K/UL — HIGH (ref 3.8–10.5)
WBC # FLD AUTO: 18.75 K/UL — HIGH (ref 3.8–10.5)
WBC # FLD AUTO: 19.99 K/UL — HIGH (ref 3.8–10.5)
WBC # FLD AUTO: 2.52 K/UL — LOW (ref 3.8–10.5)
WBC # FLD AUTO: 3.58 K/UL — LOW (ref 3.8–10.5)
WBC # FLD AUTO: 4.76 K/UL — SIGNIFICANT CHANGE UP (ref 3.8–10.5)
WBC # FLD AUTO: 5.8 K/UL — SIGNIFICANT CHANGE UP (ref 3.8–10.5)
WBC # FLD AUTO: 5.83 K/UL — SIGNIFICANT CHANGE UP (ref 3.8–10.5)
WBC # FLD AUTO: 6.46 K/UL
WBC # FLD AUTO: 6.49 K/UL — SIGNIFICANT CHANGE UP (ref 3.8–10.5)
WBC # FLD AUTO: 6.82 K/UL
WBC # FLD AUTO: 7.22 K/UL — SIGNIFICANT CHANGE UP (ref 3.8–10.5)
WBC # FLD AUTO: 7.82 K/UL
WBC # FLD AUTO: 8.01 K/UL — SIGNIFICANT CHANGE UP (ref 3.8–10.5)
WBC # FLD AUTO: 8.72 K/UL — SIGNIFICANT CHANGE UP (ref 3.8–10.5)
WBC # FLD AUTO: 9.98 K/UL — SIGNIFICANT CHANGE UP (ref 3.8–10.5)

## 2021-01-01 PROCEDURE — 99214 OFFICE O/P EST MOD 30 MIN: CPT

## 2021-01-01 PROCEDURE — 99214 OFFICE O/P EST MOD 30 MIN: CPT | Mod: 25

## 2021-01-01 PROCEDURE — 78815 PET IMAGE W/CT SKULL-THIGH: CPT | Mod: 26,PS

## 2021-01-01 PROCEDURE — 99214 OFFICE O/P EST MOD 30 MIN: CPT | Mod: 95

## 2021-01-01 PROCEDURE — 99233 SBSQ HOSP IP/OBS HIGH 50: CPT

## 2021-01-01 PROCEDURE — A9552: CPT

## 2021-01-01 PROCEDURE — 31641 BRONCHOSCOPY TREAT BLOCKAGE: CPT

## 2021-01-01 PROCEDURE — 74176 CT ABD & PELVIS W/O CONTRAST: CPT | Mod: 26,MA

## 2021-01-01 PROCEDURE — 99497 ADVNCD CARE PLAN 30 MIN: CPT | Mod: 25

## 2021-01-01 PROCEDURE — 43239 EGD BIOPSY SINGLE/MULTIPLE: CPT

## 2021-01-01 PROCEDURE — 86870 RBC ANTIBODY IDENTIFICATION: CPT

## 2021-01-01 PROCEDURE — 99222 1ST HOSP IP/OBS MODERATE 55: CPT | Mod: GC

## 2021-01-01 PROCEDURE — 71045 X-RAY EXAM CHEST 1 VIEW: CPT | Mod: 26

## 2021-01-01 PROCEDURE — 99232 SBSQ HOSP IP/OBS MODERATE 35: CPT

## 2021-01-01 PROCEDURE — 99215 OFFICE O/P EST HI 40 MIN: CPT

## 2021-01-01 PROCEDURE — 86880 COOMBS TEST DIRECT: CPT

## 2021-01-01 PROCEDURE — 99205 OFFICE O/P NEW HI 60 MIN: CPT | Mod: 95

## 2021-01-01 PROCEDURE — 93010 ELECTROCARDIOGRAM REPORT: CPT

## 2021-01-01 PROCEDURE — 88331 PATH CONSLTJ SURG 1 BLK 1SPC: CPT | Mod: 26

## 2021-01-01 PROCEDURE — 71260 CT THORAX DX C+: CPT | Mod: 26

## 2021-01-01 PROCEDURE — 31579 LARYNGOSCOPY TELESCOPIC: CPT | Mod: 59

## 2021-01-01 PROCEDURE — 86905 BLOOD TYPING RBC ANTIGENS: CPT

## 2021-01-01 PROCEDURE — 99239 HOSP IP/OBS DSCHRG MGMT >30: CPT

## 2021-01-01 PROCEDURE — 78815 PET IMAGE W/CT SKULL-THIGH: CPT

## 2021-01-01 PROCEDURE — 86850 RBC ANTIBODY SCREEN: CPT

## 2021-01-01 PROCEDURE — 99072 ADDL SUPL MATRL&STAF TM PHE: CPT

## 2021-01-01 PROCEDURE — 99233 SBSQ HOSP IP/OBS HIGH 50: CPT | Mod: GC

## 2021-01-01 PROCEDURE — 74018 RADEX ABDOMEN 1 VIEW: CPT | Mod: 26

## 2021-01-01 PROCEDURE — 31579 LARYNGOSCOPY TELESCOPIC: CPT

## 2021-01-01 PROCEDURE — 99215 OFFICE O/P EST HI 40 MIN: CPT | Mod: 95

## 2021-01-01 PROCEDURE — 31625 BRONCHOSCOPY W/BIOPSY(S): CPT | Mod: 59

## 2021-01-01 PROCEDURE — 99212 OFFICE O/P EST SF 10 MIN: CPT

## 2021-01-01 PROCEDURE — 31645 BRNCHSC W/THER ASPIR 1ST: CPT

## 2021-01-01 PROCEDURE — 31624 DX BRONCHOSCOPE/LAVAGE: CPT

## 2021-01-01 PROCEDURE — 71260 CT THORAX DX C+: CPT

## 2021-01-01 PROCEDURE — 99213 OFFICE O/P EST LOW 20 MIN: CPT | Mod: 95

## 2021-01-01 PROCEDURE — 99358 PROLONG SERVICE W/O CONTACT: CPT

## 2021-01-01 PROCEDURE — 99024 POSTOP FOLLOW-UP VISIT: CPT

## 2021-01-01 PROCEDURE — 99285 EMERGENCY DEPT VISIT HI MDM: CPT | Mod: 25

## 2021-01-01 PROCEDURE — 86900 BLOOD TYPING SEROLOGIC ABO: CPT

## 2021-01-01 PROCEDURE — 88305 TISSUE EXAM BY PATHOLOGIST: CPT | Mod: 26

## 2021-01-01 PROCEDURE — 99223 1ST HOSP IP/OBS HIGH 75: CPT

## 2021-01-01 PROCEDURE — 12345: CPT | Mod: NC

## 2021-01-01 PROCEDURE — 86901 BLOOD TYPING SEROLOGIC RH(D): CPT

## 2021-01-01 PROCEDURE — 76770 US EXAM ABDO BACK WALL COMP: CPT | Mod: 26

## 2021-01-01 RX ORDER — AMLODIPINE BESYLATE 2.5 MG/1
10 TABLET ORAL AT BEDTIME
Refills: 0 | Status: DISCONTINUED | OUTPATIENT
Start: 2021-01-01 | End: 2021-01-01

## 2021-01-01 RX ORDER — DIGOXIN 250 MCG
250 TABLET ORAL EVERY 8 HOURS
Refills: 0 | Status: DISCONTINUED | OUTPATIENT
Start: 2021-01-01 | End: 2021-01-01

## 2021-01-01 RX ORDER — ONDANSETRON 8 MG/1
8 TABLET, FILM COATED ORAL EVERY 6 HOURS
Refills: 0 | Status: DISCONTINUED | OUTPATIENT
Start: 2021-01-01 | End: 2021-01-01

## 2021-01-01 RX ORDER — SODIUM CHLORIDE 9 MG/ML
500 INJECTION INTRAMUSCULAR; INTRAVENOUS; SUBCUTANEOUS ONCE
Refills: 0 | Status: COMPLETED | OUTPATIENT
Start: 2021-01-01 | End: 2021-01-01

## 2021-01-01 RX ORDER — LEVOTHYROXINE SODIUM 125 MCG
1 TABLET ORAL
Qty: 0 | Refills: 0 | DISCHARGE

## 2021-01-01 RX ORDER — SODIUM CHLORIDE 9 MG/ML
1000 INJECTION INTRAMUSCULAR; INTRAVENOUS; SUBCUTANEOUS
Refills: 0 | Status: DISCONTINUED | OUTPATIENT
Start: 2021-01-01 | End: 2021-01-01

## 2021-01-01 RX ORDER — KETOROLAC TROMETHAMINE 30 MG/ML
15 SYRINGE (ML) INJECTION EVERY 6 HOURS
Refills: 0 | Status: DISCONTINUED | OUTPATIENT
Start: 2021-01-01 | End: 2021-01-01

## 2021-01-01 RX ORDER — METOCLOPRAMIDE HCL 10 MG
10 TABLET ORAL EVERY 8 HOURS
Refills: 0 | Status: DISCONTINUED | OUTPATIENT
Start: 2021-01-01 | End: 2021-01-01

## 2021-01-01 RX ORDER — OXYCODONE HYDROCHLORIDE 5 MG/1
5 TABLET ORAL
Refills: 0 | Status: DISCONTINUED | OUTPATIENT
Start: 2021-01-01 | End: 2021-01-01

## 2021-01-01 RX ORDER — MIDODRINE HYDROCHLORIDE 2.5 MG/1
10 TABLET ORAL ONCE
Refills: 0 | Status: COMPLETED | OUTPATIENT
Start: 2021-01-01 | End: 2021-01-01

## 2021-01-01 RX ORDER — APIXABAN 2.5 MG/1
1 TABLET, FILM COATED ORAL
Qty: 0 | Refills: 0 | DISCHARGE

## 2021-01-01 RX ORDER — MIDODRINE HYDROCHLORIDE 2.5 MG/1
5 TABLET ORAL ONCE
Refills: 0 | Status: COMPLETED | OUTPATIENT
Start: 2021-01-01 | End: 2021-01-01

## 2021-01-01 RX ORDER — ONDANSETRON 8 MG/1
4 TABLET, FILM COATED ORAL ONCE
Refills: 0 | Status: COMPLETED | OUTPATIENT
Start: 2021-01-01 | End: 2021-01-01

## 2021-01-01 RX ORDER — IPRATROPIUM/ALBUTEROL SULFATE 18-103MCG
3 AEROSOL WITH ADAPTER (GRAM) INHALATION ONCE
Refills: 0 | Status: COMPLETED | OUTPATIENT
Start: 2021-01-01 | End: 2021-01-01

## 2021-01-01 RX ORDER — DILTIAZEM HCL 120 MG
1 CAPSULE, EXT RELEASE 24 HR ORAL
Qty: 0 | Refills: 0 | DISCHARGE

## 2021-01-01 RX ORDER — BENZONATATE 100 MG/1
100 CAPSULE ORAL
Qty: 90 | Refills: 5 | Status: DISCONTINUED | COMMUNITY
Start: 2021-01-01 | End: 2021-01-01

## 2021-01-01 RX ORDER — CAPMATINIB 150 MG/1
150 TABLET, FILM COATED ORAL
Qty: 60 | Refills: 5 | Status: DISCONTINUED | COMMUNITY
Start: 2021-01-01 | End: 2021-01-01

## 2021-01-01 RX ORDER — ACETAMINOPHEN 500 MG
1000 TABLET ORAL ONCE
Refills: 0 | Status: COMPLETED | OUTPATIENT
Start: 2021-01-01 | End: 2021-01-01

## 2021-01-01 RX ORDER — FENTANYL CITRATE 50 UG/ML
1 INJECTION INTRAVENOUS
Refills: 0 | Status: DISCONTINUED | OUTPATIENT
Start: 2021-01-01 | End: 2021-01-01

## 2021-01-01 RX ORDER — SODIUM CHLORIDE 9 MG/ML
1000 INJECTION, SOLUTION INTRAVENOUS
Refills: 0 | Status: DISCONTINUED | OUTPATIENT
Start: 2021-01-01 | End: 2021-01-01

## 2021-01-01 RX ORDER — DILTIAZEM HCL 120 MG
10 CAPSULE, EXT RELEASE 24 HR ORAL ONCE
Refills: 0 | Status: COMPLETED | OUTPATIENT
Start: 2021-01-01 | End: 2021-01-01

## 2021-01-01 RX ORDER — OXYCODONE HYDROCHLORIDE 5 MG/1
5 TABLET ORAL EVERY 6 HOURS
Refills: 0 | Status: DISCONTINUED | OUTPATIENT
Start: 2021-01-01 | End: 2021-01-01

## 2021-01-01 RX ORDER — METOPROLOL TARTRATE 50 MG
5 TABLET ORAL ONCE
Refills: 0 | Status: COMPLETED | OUTPATIENT
Start: 2021-01-01 | End: 2021-01-01

## 2021-01-01 RX ORDER — NYSTATIN 100000 [USP'U]/ML
100000 SUSPENSION ORAL 3 TIMES DAILY
Qty: 1 | Refills: 1 | Status: ACTIVE | COMMUNITY
Start: 2021-01-01 | End: 1900-01-01

## 2021-01-01 RX ORDER — ACETAMINOPHEN 500 MG
650 TABLET ORAL EVERY 6 HOURS
Refills: 0 | Status: DISCONTINUED | OUTPATIENT
Start: 2021-01-01 | End: 2021-01-01

## 2021-01-01 RX ORDER — METOPROLOL TARTRATE 50 MG
100 TABLET ORAL AT BEDTIME
Refills: 0 | Status: DISCONTINUED | OUTPATIENT
Start: 2021-01-01 | End: 2021-01-01

## 2021-01-01 RX ORDER — POTASSIUM CHLORIDE 20 MEQ
10 PACKET (EA) ORAL
Refills: 0 | Status: COMPLETED | OUTPATIENT
Start: 2021-01-01 | End: 2021-01-01

## 2021-01-01 RX ORDER — FLUTICASONE PROPIONATE 110 UG/1
110 AEROSOL, METERED RESPIRATORY (INHALATION)
Refills: 0 | Status: DISCONTINUED | COMMUNITY
End: 2021-01-01

## 2021-01-01 RX ORDER — DIPHENHYDRAMINE HYDROCHLORIDE AND LIDOCAINE HYDROCHLORIDE AND ALUMINUM HYDROXIDE AND MAGNESIUM HYDRO
KIT
Qty: 1 | Refills: 5 | Status: ACTIVE | COMMUNITY
Start: 2021-01-01 | End: 1900-01-01

## 2021-01-01 RX ORDER — POTASSIUM CHLORIDE 20 MEQ
40 PACKET (EA) ORAL ONCE
Refills: 0 | Status: COMPLETED | OUTPATIENT
Start: 2021-01-01 | End: 2021-01-01

## 2021-01-01 RX ORDER — METOCLOPRAMIDE HCL 10 MG
1 TABLET ORAL
Qty: 42 | Refills: 0
Start: 2021-01-01 | End: 2021-01-01

## 2021-01-01 RX ORDER — SENNA PLUS 8.6 MG/1
1 TABLET ORAL
Qty: 0 | Refills: 0 | DISCHARGE

## 2021-01-01 RX ORDER — AMLODIPINE BESYLATE 2.5 MG/1
1 TABLET ORAL
Qty: 0 | Refills: 0 | DISCHARGE

## 2021-01-01 RX ORDER — ONDANSETRON 8 MG/1
4 TABLET, FILM COATED ORAL EVERY 8 HOURS
Refills: 0 | Status: DISCONTINUED | OUTPATIENT
Start: 2021-01-01 | End: 2021-01-01

## 2021-01-01 RX ORDER — SODIUM CHLORIDE 9 MG/ML
2000 INJECTION INTRAMUSCULAR; INTRAVENOUS; SUBCUTANEOUS ONCE
Refills: 0 | Status: COMPLETED | OUTPATIENT
Start: 2021-01-01 | End: 2021-01-01

## 2021-01-01 RX ORDER — POLYETHYLENE GLYCOL 3350 17 G/17G
17 POWDER, FOR SOLUTION ORAL
Qty: 0 | Refills: 0 | DISCHARGE

## 2021-01-01 RX ORDER — FENTANYL CITRATE 50 UG/ML
25 INJECTION INTRAVENOUS
Refills: 0 | Status: DISCONTINUED | OUTPATIENT
Start: 2021-01-01 | End: 2021-01-01

## 2021-01-01 RX ORDER — DILTIAZEM HYDROCHLORIDE 120 MG/1
120 CAPSULE, EXTENDED RELEASE ORAL DAILY
Refills: 0 | Status: DISCONTINUED | COMMUNITY
Start: 2021-01-01 | End: 2021-01-01

## 2021-01-01 RX ORDER — OMEPRAZOLE 10 MG/1
1 CAPSULE, DELAYED RELEASE ORAL
Qty: 0 | Refills: 0 | DISCHARGE

## 2021-01-01 RX ORDER — APIXABAN 2.5 MG/1
2.5 TABLET, FILM COATED ORAL
Qty: 60 | Refills: 5 | Status: ACTIVE | COMMUNITY
Start: 2021-01-01 | End: 1900-01-01

## 2021-01-01 RX ORDER — FAMOTIDINE 10 MG/ML
1 INJECTION INTRAVENOUS
Qty: 0 | Refills: 0 | DISCHARGE

## 2021-01-01 RX ORDER — ONDANSETRON 8 MG/1
4 TABLET, FILM COATED ORAL ONCE
Refills: 0 | Status: DISCONTINUED | OUTPATIENT
Start: 2021-01-01 | End: 2021-01-01

## 2021-01-01 RX ORDER — AMLODIPINE BESYLATE 10 MG/1
10 TABLET ORAL
Refills: 0 | Status: DISCONTINUED | COMMUNITY
End: 2021-01-01

## 2021-01-01 RX ORDER — OXYCODONE HYDROCHLORIDE 5 MG/1
5 TABLET ORAL
Qty: 280 | Refills: 0
Start: 2021-01-01 | End: 2021-01-01

## 2021-01-01 RX ORDER — CAPMATINIB 200 MG/1
1 TABLET, FILM COATED ORAL
Qty: 0 | Refills: 0 | DISCHARGE

## 2021-01-01 RX ORDER — BUDESONIDE, MICRONIZED 100 %
0.5 POWDER (GRAM) MISCELLANEOUS DAILY
Refills: 0 | Status: DISCONTINUED | OUTPATIENT
Start: 2021-01-01 | End: 2021-01-01

## 2021-01-01 RX ORDER — BUDESONIDE 0.5 MG/2ML
0.5 INHALANT ORAL TWICE DAILY
Qty: 1 | Refills: 5 | Status: DISCONTINUED | COMMUNITY
Start: 2021-01-01 | End: 2021-01-01

## 2021-01-01 RX ORDER — MAGNESIUM SULFATE 500 MG/ML
2 VIAL (ML) INJECTION ONCE
Refills: 0 | Status: COMPLETED | OUTPATIENT
Start: 2021-01-01 | End: 2021-01-01

## 2021-01-01 RX ORDER — METOPROLOL TARTRATE 50 MG
1 TABLET ORAL
Qty: 0 | Refills: 0 | DISCHARGE

## 2021-01-01 RX ORDER — APIXABAN 5 MG/1
5 TABLET, FILM COATED ORAL
Refills: 0 | Status: COMPLETED | COMMUNITY
Start: 2021-01-01 | End: 2021-01-01

## 2021-01-01 RX ORDER — SODIUM ZIRCONIUM CYCLOSILICATE 10 G/10G
10 POWDER, FOR SUSPENSION ORAL ONCE
Refills: 0 | Status: COMPLETED | OUTPATIENT
Start: 2021-01-01 | End: 2021-01-01

## 2021-01-01 RX ORDER — INFLUENZA VIRUS VACCINE 15; 15; 15; 15 UG/.5ML; UG/.5ML; UG/.5ML; UG/.5ML
0.7 SUSPENSION INTRAMUSCULAR ONCE
Refills: 0 | Status: DISCONTINUED | OUTPATIENT
Start: 2021-01-01 | End: 2021-01-01

## 2021-01-01 RX ORDER — ACETAMINOPHEN 500 MG
2 TABLET ORAL
Qty: 0 | Refills: 0 | DISCHARGE
Start: 2021-01-01

## 2021-01-01 RX ORDER — SODIUM CHLORIDE 9 MG/ML
1000 INJECTION INTRAMUSCULAR; INTRAVENOUS; SUBCUTANEOUS ONCE
Refills: 0 | Status: COMPLETED | OUTPATIENT
Start: 2021-01-01 | End: 2021-01-01

## 2021-01-01 RX ORDER — METOPROLOL TARTRATE 50 MG
100 TABLET ORAL DAILY
Refills: 0 | Status: DISCONTINUED | OUTPATIENT
Start: 2021-01-01 | End: 2021-01-01

## 2021-01-01 RX ORDER — BENZOCAINE AND MENTHOL 5; 1 G/100ML; G/100ML
1 LIQUID ORAL ONCE
Refills: 0 | Status: COMPLETED | OUTPATIENT
Start: 2021-01-01 | End: 2021-01-01

## 2021-01-01 RX ORDER — CAPMATINIB 200 MG/1
200 TABLET, FILM COATED ORAL
Qty: 60 | Refills: 5 | Status: DISCONTINUED | COMMUNITY
Start: 2021-01-01 | End: 2021-01-01

## 2021-01-01 RX ORDER — DIPHENHYDRAMINE HYDROCHLORIDE AND LIDOCAINE HYDROCHLORIDE AND ALUMINUM HYDROXIDE AND MAGNESIUM HYDRO
5 KIT EVERY 6 HOURS
Refills: 0 | Status: DISCONTINUED | OUTPATIENT
Start: 2021-01-01 | End: 2021-01-01

## 2021-01-01 RX ORDER — SODIUM,POTASSIUM PHOSPHATES 278-250MG
1 POWDER IN PACKET (EA) ORAL ONCE
Refills: 0 | Status: COMPLETED | OUTPATIENT
Start: 2021-01-01 | End: 2021-01-01

## 2021-01-01 RX ORDER — MIDODRINE HYDROCHLORIDE 2.5 MG/1
5 TABLET ORAL EVERY 8 HOURS
Refills: 0 | Status: DISCONTINUED | OUTPATIENT
Start: 2021-01-01 | End: 2021-01-01

## 2021-01-01 RX ORDER — METOCLOPRAMIDE HCL 10 MG
5 TABLET ORAL THREE TIMES A DAY
Refills: 0 | Status: DISCONTINUED | OUTPATIENT
Start: 2021-01-01 | End: 2021-01-01

## 2021-01-01 RX ORDER — MUPIROCIN 20 MG/G
2 OINTMENT TOPICAL 3 TIMES DAILY
Qty: 1 | Refills: 2 | Status: COMPLETED | COMMUNITY
Start: 2021-01-01 | End: 2021-01-01

## 2021-01-01 RX ORDER — METOPROLOL TARTRATE 50 MG
25 TABLET ORAL ONCE
Refills: 0 | Status: COMPLETED | OUTPATIENT
Start: 2021-01-01 | End: 2021-01-01

## 2021-01-01 RX ORDER — LEVOTHYROXINE SODIUM 125 MCG
75 TABLET ORAL DAILY
Refills: 0 | Status: DISCONTINUED | OUTPATIENT
Start: 2021-01-01 | End: 2021-01-01

## 2021-01-01 RX ORDER — MULTIVITAMIN
TABLET ORAL
Refills: 0 | Status: DISCONTINUED | COMMUNITY
End: 2021-01-01

## 2021-01-01 RX ORDER — APIXABAN 2.5 MG/1
2.5 TABLET, FILM COATED ORAL EVERY 12 HOURS
Refills: 0 | Status: DISCONTINUED | OUTPATIENT
Start: 2021-01-01 | End: 2021-01-01

## 2021-01-01 RX ORDER — HYDROCODONE BITARTRATE AND HOMATROPINE METHYLBROMIDE 5; 1.5 MG/5ML; MG/5ML
5-1.5 SYRUP ORAL
Qty: 1 | Refills: 0 | Status: ACTIVE | COMMUNITY
Start: 2021-01-01 | End: 1900-01-01

## 2021-01-01 RX ORDER — ERGOCALCIFEROL 1.25 MG/1
1 CAPSULE ORAL
Qty: 0 | Refills: 0 | DISCHARGE

## 2021-01-01 RX ORDER — FAMOTIDINE 40 MG/1
40 TABLET, FILM COATED ORAL
Qty: 90 | Refills: 1 | Status: DISCONTINUED | COMMUNITY
Start: 2021-01-01 | End: 2021-01-01

## 2021-01-01 RX ORDER — METOCLOPRAMIDE 10 MG/1
10 TABLET ORAL
Qty: 60 | Refills: 5 | Status: ACTIVE | COMMUNITY
Start: 2019-11-12 | End: 1900-01-01

## 2021-01-01 RX ORDER — BUDESONIDE, MICRONIZED 100 %
2 POWDER (GRAM) MISCELLANEOUS
Qty: 0 | Refills: 0 | DISCHARGE

## 2021-01-01 RX ORDER — METOPROLOL TARTRATE 50 MG
5 TABLET ORAL EVERY 6 HOURS
Refills: 0 | Status: DISCONTINUED | OUTPATIENT
Start: 2021-01-01 | End: 2021-01-01

## 2021-01-01 RX ORDER — TEPOTINIB HYDROCHLORIDE 225 MG/1
225 TABLET ORAL
Qty: 60 | Refills: 5 | Status: DISCONTINUED | COMMUNITY
Start: 2021-01-01 | End: 2021-01-01

## 2021-01-01 RX ORDER — FLUTICASONE PROPIONATE 220 MCG
1 AEROSOL WITH ADAPTER (GRAM) INHALATION
Qty: 0 | Refills: 0 | DISCHARGE

## 2021-01-01 RX ORDER — METOPROLOL SUCCINATE 100 MG/1
100 TABLET, EXTENDED RELEASE ORAL
Refills: 0 | Status: ACTIVE | COMMUNITY

## 2021-01-01 RX ORDER — LACTULOSE 10 G/15ML
10 SOLUTION ORAL ONCE
Refills: 0 | Status: DISCONTINUED | OUTPATIENT
Start: 2021-01-01 | End: 2021-01-01

## 2021-01-01 RX ORDER — CHROMIUM 200 MCG
TABLET ORAL
Refills: 0 | Status: DISCONTINUED | COMMUNITY
End: 2021-01-01

## 2021-01-01 RX ORDER — OXYCODONE HYDROCHLORIDE 5 MG/1
5 TABLET ORAL
Qty: 0 | Refills: 0 | DISCHARGE
Start: 2021-01-01

## 2021-01-01 RX ORDER — PANTOPRAZOLE SODIUM 20 MG/1
40 TABLET, DELAYED RELEASE ORAL DAILY
Refills: 0 | Status: DISCONTINUED | OUTPATIENT
Start: 2021-01-01 | End: 2021-01-01

## 2021-01-01 RX ORDER — ASCORBIC ACID 500 MG
TABLET ORAL
Refills: 0 | Status: DISCONTINUED | COMMUNITY
End: 2021-01-01

## 2021-01-01 RX ORDER — DILTIAZEM HCL 120 MG
120 CAPSULE, EXT RELEASE 24 HR ORAL DAILY
Refills: 0 | Status: DISCONTINUED | OUTPATIENT
Start: 2021-01-01 | End: 2021-01-01

## 2021-01-01 RX ORDER — CAPMATINIB 150 MG/1
150 TABLET, FILM COATED ORAL
Qty: 120 | Refills: 5 | Status: DISCONTINUED | COMMUNITY
Start: 2020-11-06 | End: 2021-01-01

## 2021-01-01 RX ORDER — ONDANSETRON 8 MG/1
1 TABLET, FILM COATED ORAL
Qty: 42 | Refills: 0
Start: 2021-01-01 | End: 2021-01-01

## 2021-01-01 RX ADMIN — SODIUM CHLORIDE 100 MILLILITER(S): 9 INJECTION INTRAMUSCULAR; INTRAVENOUS; SUBCUTANEOUS at 18:38

## 2021-01-01 RX ADMIN — Medication 1 DROP(S): at 14:15

## 2021-01-01 RX ADMIN — Medication 50 GRAM(S): at 18:58

## 2021-01-01 RX ADMIN — SODIUM ZIRCONIUM CYCLOSILICATE 10 GRAM(S): 10 POWDER, FOR SUSPENSION ORAL at 23:24

## 2021-01-01 RX ADMIN — PANTOPRAZOLE SODIUM 40 MILLIGRAM(S): 20 TABLET, DELAYED RELEASE ORAL at 11:54

## 2021-01-01 RX ADMIN — Medication 5 MILLIGRAM(S): at 09:11

## 2021-01-01 RX ADMIN — APIXABAN 2.5 MILLIGRAM(S): 2.5 TABLET, FILM COATED ORAL at 18:34

## 2021-01-01 RX ADMIN — SODIUM CHLORIDE 100 MILLILITER(S): 9 INJECTION INTRAMUSCULAR; INTRAVENOUS; SUBCUTANEOUS at 08:34

## 2021-01-01 RX ADMIN — Medication 1000 MILLIGRAM(S): at 17:56

## 2021-01-01 RX ADMIN — FENTANYL CITRATE 1 PATCH: 50 INJECTION INTRAVENOUS at 06:02

## 2021-01-01 RX ADMIN — Medication 400 MILLIGRAM(S): at 17:26

## 2021-01-01 RX ADMIN — Medication 1 DROP(S): at 23:30

## 2021-01-01 RX ADMIN — SODIUM CHLORIDE 100 MILLILITER(S): 9 INJECTION, SOLUTION INTRAVENOUS at 12:42

## 2021-01-01 RX ADMIN — Medication 0.5 MILLIGRAM(S): at 07:28

## 2021-01-01 RX ADMIN — APIXABAN 2.5 MILLIGRAM(S): 2.5 TABLET, FILM COATED ORAL at 05:25

## 2021-01-01 RX ADMIN — Medication 5 MILLIGRAM(S): at 07:37

## 2021-01-01 RX ADMIN — SODIUM CHLORIDE 100 MILLILITER(S): 9 INJECTION, SOLUTION INTRAVENOUS at 07:23

## 2021-01-01 RX ADMIN — Medication 5 MILLIGRAM(S): at 12:07

## 2021-01-01 RX ADMIN — Medication 400 MILLIGRAM(S): at 18:18

## 2021-01-01 RX ADMIN — Medication 5 MILLIGRAM(S): at 16:40

## 2021-01-01 RX ADMIN — Medication 120 MILLIGRAM(S): at 06:36

## 2021-01-01 RX ADMIN — Medication 1 DROP(S): at 06:09

## 2021-01-01 RX ADMIN — Medication 5 MILLIGRAM(S): at 17:41

## 2021-01-01 RX ADMIN — MIDODRINE HYDROCHLORIDE 10 MILLIGRAM(S): 2.5 TABLET ORAL at 23:13

## 2021-01-01 RX ADMIN — SODIUM CHLORIDE 1000 MILLILITER(S): 9 INJECTION INTRAMUSCULAR; INTRAVENOUS; SUBCUTANEOUS at 22:54

## 2021-01-01 RX ADMIN — SODIUM CHLORIDE 100 MILLILITER(S): 9 INJECTION INTRAMUSCULAR; INTRAVENOUS; SUBCUTANEOUS at 06:32

## 2021-01-01 RX ADMIN — Medication 1 DROP(S): at 05:21

## 2021-01-01 RX ADMIN — Medication 1000 MILLIGRAM(S): at 18:48

## 2021-01-01 RX ADMIN — Medication 250 MICROGRAM(S): at 05:25

## 2021-01-01 RX ADMIN — FENTANYL CITRATE 1 PATCH: 50 INJECTION INTRAVENOUS at 17:40

## 2021-01-01 RX ADMIN — OXYCODONE HYDROCHLORIDE 5 MILLIGRAM(S): 5 TABLET ORAL at 14:45

## 2021-01-01 RX ADMIN — PANTOPRAZOLE SODIUM 40 MILLIGRAM(S): 20 TABLET, DELAYED RELEASE ORAL at 13:55

## 2021-01-01 RX ADMIN — Medication 250 MICROGRAM(S): at 22:37

## 2021-01-01 RX ADMIN — Medication 5 MILLIGRAM(S): at 09:54

## 2021-01-01 RX ADMIN — Medication 75 MICROGRAM(S): at 05:25

## 2021-01-01 RX ADMIN — MIDODRINE HYDROCHLORIDE 5 MILLIGRAM(S): 2.5 TABLET ORAL at 00:36

## 2021-01-01 RX ADMIN — Medication 1 DROP(S): at 17:50

## 2021-01-01 RX ADMIN — APIXABAN 2.5 MILLIGRAM(S): 2.5 TABLET, FILM COATED ORAL at 18:06

## 2021-01-01 RX ADMIN — Medication 1 DROP(S): at 11:54

## 2021-01-01 RX ADMIN — Medication 1000 MILLIGRAM(S): at 18:52

## 2021-01-01 RX ADMIN — Medication 1000 MILLIGRAM(S): at 17:00

## 2021-01-01 RX ADMIN — APIXABAN 2.5 MILLIGRAM(S): 2.5 TABLET, FILM COATED ORAL at 06:06

## 2021-01-01 RX ADMIN — Medication 3 MILLILITER(S): at 00:48

## 2021-01-01 RX ADMIN — Medication 75 MICROGRAM(S): at 06:09

## 2021-01-01 RX ADMIN — Medication 5 MILLIGRAM(S): at 17:50

## 2021-01-01 RX ADMIN — Medication 100 MILLIEQUIVALENT(S): at 12:39

## 2021-01-01 RX ADMIN — Medication 1 DROP(S): at 18:07

## 2021-01-01 RX ADMIN — Medication 100 MILLIEQUIVALENT(S): at 21:04

## 2021-01-01 RX ADMIN — Medication 0.5 MILLIGRAM(S): at 10:04

## 2021-01-01 RX ADMIN — APIXABAN 2.5 MILLIGRAM(S): 2.5 TABLET, FILM COATED ORAL at 06:09

## 2021-01-01 RX ADMIN — ONDANSETRON 8 MILLIGRAM(S): 8 TABLET, FILM COATED ORAL at 12:41

## 2021-01-01 RX ADMIN — APIXABAN 2.5 MILLIGRAM(S): 2.5 TABLET, FILM COATED ORAL at 06:38

## 2021-01-01 RX ADMIN — Medication 5 MILLIGRAM(S): at 11:53

## 2021-01-01 RX ADMIN — Medication 5 MILLIGRAM(S): at 15:58

## 2021-01-01 RX ADMIN — SODIUM CHLORIDE 60 MILLILITER(S): 9 INJECTION, SOLUTION INTRAVENOUS at 13:52

## 2021-01-01 RX ADMIN — APIXABAN 2.5 MILLIGRAM(S): 2.5 TABLET, FILM COATED ORAL at 17:50

## 2021-01-01 RX ADMIN — Medication 5 MILLIGRAM(S): at 17:23

## 2021-01-01 RX ADMIN — Medication 0.5 MILLIGRAM(S): at 09:03

## 2021-01-01 RX ADMIN — Medication 1 DROP(S): at 05:09

## 2021-01-01 RX ADMIN — Medication 100 MILLIGRAM(S): at 14:44

## 2021-01-01 RX ADMIN — Medication 5 MILLIGRAM(S): at 19:55

## 2021-01-01 RX ADMIN — Medication 10 MILLIGRAM(S): at 14:47

## 2021-01-01 RX ADMIN — APIXABAN 2.5 MILLIGRAM(S): 2.5 TABLET, FILM COATED ORAL at 05:53

## 2021-01-01 RX ADMIN — APIXABAN 2.5 MILLIGRAM(S): 2.5 TABLET, FILM COATED ORAL at 05:10

## 2021-01-01 RX ADMIN — Medication 100 MILLIEQUIVALENT(S): at 11:30

## 2021-01-01 RX ADMIN — APIXABAN 2.5 MILLIGRAM(S): 2.5 TABLET, FILM COATED ORAL at 18:19

## 2021-01-01 RX ADMIN — Medication 100 MILLIGRAM(S): at 23:21

## 2021-01-01 RX ADMIN — OXYCODONE HYDROCHLORIDE 5 MILLIGRAM(S): 5 TABLET ORAL at 11:53

## 2021-01-01 RX ADMIN — ONDANSETRON 4 MILLIGRAM(S): 8 TABLET, FILM COATED ORAL at 20:01

## 2021-01-01 RX ADMIN — Medication 1 DROP(S): at 18:38

## 2021-01-01 RX ADMIN — SODIUM CHLORIDE 2000 MILLILITER(S): 9 INJECTION INTRAMUSCULAR; INTRAVENOUS; SUBCUTANEOUS at 16:40

## 2021-01-01 RX ADMIN — Medication 2 GRAM(S): at 19:55

## 2021-01-01 RX ADMIN — PANTOPRAZOLE SODIUM 40 MILLIGRAM(S): 20 TABLET, DELAYED RELEASE ORAL at 11:17

## 2021-01-01 RX ADMIN — Medication 400 MILLIGRAM(S): at 16:39

## 2021-01-01 RX ADMIN — Medication 75 MICROGRAM(S): at 06:37

## 2021-01-01 RX ADMIN — SODIUM CHLORIDE 100 MILLILITER(S): 9 INJECTION INTRAMUSCULAR; INTRAVENOUS; SUBCUTANEOUS at 09:55

## 2021-01-01 RX ADMIN — OXYCODONE HYDROCHLORIDE 5 MILLIGRAM(S): 5 TABLET ORAL at 12:30

## 2021-01-01 RX ADMIN — Medication 75 MICROGRAM(S): at 05:10

## 2021-01-01 RX ADMIN — APIXABAN 2.5 MILLIGRAM(S): 2.5 TABLET, FILM COATED ORAL at 17:43

## 2021-01-01 RX ADMIN — Medication 1 DROP(S): at 17:41

## 2021-01-01 RX ADMIN — Medication 1 PACKET(S): at 12:46

## 2021-01-01 RX ADMIN — Medication 40 MILLIEQUIVALENT(S): at 00:02

## 2021-01-01 RX ADMIN — Medication 10 MILLIGRAM(S): at 20:08

## 2021-01-01 RX ADMIN — Medication 100 MILLIEQUIVALENT(S): at 00:03

## 2021-01-01 RX ADMIN — Medication 0.5 MILLIGRAM(S): at 08:52

## 2021-01-01 RX ADMIN — SODIUM CHLORIDE 1000 MILLILITER(S): 9 INJECTION INTRAMUSCULAR; INTRAVENOUS; SUBCUTANEOUS at 23:08

## 2021-01-01 RX ADMIN — Medication 5 MILLIGRAM(S): at 22:19

## 2021-01-01 RX ADMIN — Medication 10 MILLIGRAM(S): at 05:53

## 2021-01-01 RX ADMIN — SODIUM CHLORIDE 2000 MILLILITER(S): 9 INJECTION INTRAMUSCULAR; INTRAVENOUS; SUBCUTANEOUS at 20:01

## 2021-01-01 RX ADMIN — Medication 5 MILLIGRAM(S): at 12:04

## 2021-01-01 RX ADMIN — Medication 25 MILLIGRAM(S): at 00:02

## 2021-01-01 RX ADMIN — Medication 1 DROP(S): at 11:18

## 2021-01-01 RX ADMIN — PANTOPRAZOLE SODIUM 40 MILLIGRAM(S): 20 TABLET, DELAYED RELEASE ORAL at 11:36

## 2021-01-01 RX ADMIN — Medication 1 DROP(S): at 05:52

## 2021-01-01 RX ADMIN — ONDANSETRON 4 MILLIGRAM(S): 8 TABLET, FILM COATED ORAL at 10:25

## 2021-01-01 RX ADMIN — APIXABAN 2.5 MILLIGRAM(S): 2.5 TABLET, FILM COATED ORAL at 17:22

## 2021-01-01 RX ADMIN — ONDANSETRON 8 MILLIGRAM(S): 8 TABLET, FILM COATED ORAL at 17:22

## 2021-01-01 RX ADMIN — PANTOPRAZOLE SODIUM 40 MILLIGRAM(S): 20 TABLET, DELAYED RELEASE ORAL at 14:15

## 2021-01-01 RX ADMIN — SODIUM CHLORIDE 500 MILLILITER(S): 9 INJECTION INTRAMUSCULAR; INTRAVENOUS; SUBCUTANEOUS at 18:41

## 2021-01-01 RX ADMIN — SODIUM CHLORIDE 1000 MILLILITER(S): 9 INJECTION INTRAMUSCULAR; INTRAVENOUS; SUBCUTANEOUS at 00:02

## 2021-01-01 RX ADMIN — Medication 75 MICROGRAM(S): at 06:06

## 2021-01-01 RX ADMIN — Medication 10 MILLIGRAM(S): at 13:55

## 2021-01-01 RX ADMIN — MIDODRINE HYDROCHLORIDE 10 MILLIGRAM(S): 2.5 TABLET ORAL at 08:35

## 2021-01-01 RX ADMIN — Medication 10 MILLIGRAM(S): at 01:55

## 2021-01-01 RX ADMIN — OXYCODONE HYDROCHLORIDE 5 MILLIGRAM(S): 5 TABLET ORAL at 14:15

## 2021-01-01 RX ADMIN — SODIUM CHLORIDE 100 MILLILITER(S): 9 INJECTION, SOLUTION INTRAVENOUS at 11:29

## 2021-01-01 RX ADMIN — Medication 75 MICROGRAM(S): at 05:53

## 2021-01-01 RX ADMIN — PANTOPRAZOLE SODIUM 40 MILLIGRAM(S): 20 TABLET, DELAYED RELEASE ORAL at 11:31

## 2021-01-01 RX ADMIN — SODIUM CHLORIDE 100 MILLILITER(S): 9 INJECTION INTRAMUSCULAR; INTRAVENOUS; SUBCUTANEOUS at 11:36

## 2021-01-01 RX ADMIN — Medication 100 MILLIEQUIVALENT(S): at 10:39

## 2021-01-01 RX ADMIN — Medication 1 DROP(S): at 00:10

## 2021-01-01 RX ADMIN — Medication 10 MILLIGRAM(S): at 22:28

## 2021-01-01 RX ADMIN — BENZOCAINE AND MENTHOL 1 LOZENGE: 5; 1 LIQUID ORAL at 14:01

## 2021-01-01 RX ADMIN — Medication 1 DROP(S): at 23:22

## 2021-01-01 RX ADMIN — Medication 1 DROP(S): at 06:06

## 2021-01-01 RX ADMIN — FENTANYL CITRATE 1 PATCH: 50 INJECTION INTRAVENOUS at 18:47

## 2021-01-01 RX ADMIN — MIDODRINE HYDROCHLORIDE 5 MILLIGRAM(S): 2.5 TABLET ORAL at 14:47

## 2021-01-01 RX ADMIN — Medication 100 MILLIEQUIVALENT(S): at 19:55

## 2021-01-01 RX ADMIN — Medication 250 MICROGRAM(S): at 14:15

## 2021-01-01 RX ADMIN — MIDODRINE HYDROCHLORIDE 5 MILLIGRAM(S): 2.5 TABLET ORAL at 06:30

## 2021-01-01 RX ADMIN — Medication 0.5 MILLIGRAM(S): at 22:05

## 2021-01-01 RX ADMIN — Medication 1 DROP(S): at 18:18

## 2021-01-01 RX ADMIN — Medication 1 DROP(S): at 11:37

## 2021-01-01 RX ADMIN — SODIUM CHLORIDE 75 MILLILITER(S): 9 INJECTION INTRAMUSCULAR; INTRAVENOUS; SUBCUTANEOUS at 11:17

## 2021-01-01 RX ADMIN — SODIUM CHLORIDE 75 MILLILITER(S): 9 INJECTION INTRAMUSCULAR; INTRAVENOUS; SUBCUTANEOUS at 07:38

## 2021-01-15 NOTE — HISTORY OF PRESENT ILLNESS
[de-identified] : voice better since thyro, pharyngo 12/14\par feels SOB when walking for a while, can climb stairs but thinks it's hard\par no hemoptysis, no change in swallowing in terms of being careful\par no aspiration symptoms\par taking ppi 40 in the morning\par

## 2021-01-15 NOTE — PHYSICAL EXAM
[Midline] : trachea located in midline position [Laryngoscopy Performed] : laryngoscopy was performed, see procedure section for findings [Normal] : no rashes [de-identified] : greatly improved

## 2021-01-19 NOTE — H&P PST ADULT - HEIGHT IN CM
Chronic Hypertension   AMBULATORY CARE:   Hypertension  is high blood pressure  Your blood pressure is the force of your blood moving against the walls of your arteries  Hypertension causes your blood pressure to get so high that your heart has to work much harder than normal  This can damage your heart  Even if you have hypertension for years, lifestyle changes, medicines, or both can help bring your blood pressure to normal   Call 911 for any of the following:   · You have chest pain  · You have any of the following signs of a heart attack:      ? Squeezing, pressure, or pain in your chest    ? You may  also have any of the following:     § Discomfort or pain in your back, neck, jaw, stomach, or arm    § Shortness of breath    § Nausea or vomiting    § Lightheadedness or a sudden cold sweat    · You become confused or have difficulty speaking  · You suddenly feel lightheaded or have trouble breathing  Seek care immediately if:   · You have a severe headache or vision loss  · You have weakness in an arm or leg  Contact your healthcare provider if:   · You feel faint, dizzy, confused, or drowsy  · You have been taking your blood pressure medicine but your pressure is higher than your provider says it should be  · You have questions or concerns about your condition or care  Treatment for chronic hypertension  may include medicine to lower your blood pressure and cholesterol levels  A low cholesterol level helps prevent heart disease and makes it easier to control your blood pressure  Heart disease can make your blood pressure harder to control  You may also need to make lifestyle changes  What you need to know about the stages of hypertension:       · Normal blood pressure is 119/79 or lower   Your healthcare provider may only check your blood pressure each year if it stays at a normal level  · Elevated blood pressure is 120/79 to 129/79   This is sometimes called prehypertension   Your healthcare provider may suggest lifestyle changes to help lower your blood pressure to a normal level  He or she may then check it again in 3 to 6 months  · Stage 1 hypertension is 130/80  to 139/89   Your provider may recommend lifestyle changes, medication, and checks every 3 to 6 months until your blood pressure is controlled  · Stage 2 hypertension is 140/90 or higher   Your provider will recommend lifestyle changes and have you take 2 kinds of hypertension medicines  You will also need to have your blood pressure checked monthly until it is controlled  Manage chronic hypertension:   · Check your blood pressure at home  Avoid smoking, caffeine, and exercise at least 30 minutes before checking your blood pressure  Sit and rest for 5 minutes before you take your blood pressure  Extend your arm and support it on a flat surface  Your arm should be at the same level as your heart  Follow the directions that came with your blood pressure monitor  Check your blood pressure 2 times, 1 minute apart, before you take your medicine in the morning  Also check your blood pressure before your evening meal  Keep a record of your readings and bring it to your follow-up visits  Ask your healthcare provider what your blood pressure should be  · Manage any other health conditions you have  Health conditions such as diabetes can increase your risk for hypertension  Follow your healthcare provider's instructions and take all your medicines as directed  Talk to your healthcare provider about any new health conditions you have recently developed  · Ask about all medicines  Certain medicines can increase your blood pressure  Examples include oral birth control pills, decongestants, herbal supplements, and NSAIDs, such as ibuprofen  Your healthcare provider can tell you which medicines are safe for you to take  This includes prescription and over-the-counter medicines      Lifestyle changes you can make to lower your blood pressure: Your provider may want you to make more lifestyle changes if you are having trouble controlling your blood pressure  This may feel difficult over time, especially if you think you are making good changes but your pressure is still high  It might help to focus on one new change at a time  For example, try to add 1 more day of exercise, or exercise for an extra 10 minutes on 2 days  Small changes can make a big difference  Your healthcare provider can also refer you to specialists such as a dietitian who can help you make small changes  · Limit sodium (salt) as directed  Too much sodium can affect your fluid balance  Check labels to find low-sodium or no-salt-added foods  Some low-sodium foods use potassium salts for flavor  Too much potassium can also cause health problems  Your healthcare provider will tell you how much sodium and potassium are safe for you to have in a day  He or she may recommend that you limit sodium to 2,300 mg a day  · Follow the meal plan recommended by your healthcare provider  A dietitian or your provider can give you more information on low-sodium plans or the DASH (Dietary Approaches to Stop Hypertension) eating plan  The DASH plan is low in sodium, unhealthy fats, and total fat  It is high in potassium, calcium, and fiber  · Exercise to maintain a healthy weight  Exercise at least 30 minutes per day, on most days of the week  This will help decrease your blood pressure  Ask your healthcare provider about the best exercise plan for you  · Decrease stress  This may help lower your blood pressure  Learn ways to relax, such as deep breathing or listening to music  · Limit alcohol as directed  Alcohol can increase your blood pressure  A drink of alcohol is 12 ounces of beer, 5 ounces of wine, or 1½ ounces of liquor  · Do not smoke    Nicotine and other chemicals in cigarettes and cigars can increase your blood pressure and also cause lung damage  Ask your healthcare provider for information if you currently smoke and need help to quit  E-cigarettes or smokeless tobacco still contain nicotine  Talk to your healthcare provider before you use these products  Follow up with your healthcare provider as directed: You will need to return to have your blood pressure checked and to have other lab tests done  Write down your questions so you remember to ask them during your visits  © Copyright [address] Information is for End User's use only and may not be sold, redistributed or otherwise used for commercial purposes  All illustrations and images included in CareNotes® are the copyrighted property of [employer]  or [employer]   The above information is an  only  It is not intended as medical advice for individual conditions or treatments  Talk to your doctor, nurse or pharmacist before following any medical regimen to see if it is safe and effective for you  Medicare Preventive Visit Patient Instructions  Thank you for completing your Welcome to Medicare Visit or Medicare Annual Wellness Visit today  Your next wellness visit will be due in one year (1/19/2022)  The screening/preventive services that you may require over the next 5-10 years are detailed below  Some tests may not apply to you based off risk factors and/or age  Screening tests ordered at today's visit but not completed yet may show as past due  Also, please note that scanned in results may not display below    Preventive Screenings:  Service Recommendations Previous Testing/Comments   Colorectal Cancer Screening  * Colonoscopy    * Fecal Occult Blood Test (FOBT)/Fecal Immunochemical Test (FIT)  * Fecal DNA/Cologuard Test  * Flexible Sigmoidoscopy Age: 54-65 years old   Colonoscopy: every 10 years (may be performed more frequently if at higher risk)  OR  FOBT/FIT: every 1 year  OR  Cologuard: every 3 years  OR  Sigmoidoscopy: every 5 years  Screening may be recommended earlier than age 48 if at higher risk for colorectal cancer  Also, an individualized decision between you and your healthcare provider will decide whether screening between the ages of 74-80 would be appropriate  Colonoscopy: Not on file  FOBT/FIT: Not on file  Cologuard: Not on file  Sigmoidoscopy: Not on file         Breast Cancer Screening Age: 36 years old  Frequency: every 1-2 years  Not required if history of left and right mastectomy Mammogram: 12/04/2017       Cervical Cancer Screening Between the ages of 21-29, pap smear recommended once every 3 years  Between the ages of 33-67, can perform pap smear with HPV co-testing every 5 years  Recommendations may differ for women with a history of total hysterectomy, cervical cancer, or abnormal pap smears in past  Pap Smear: Not on file    Screening Not Indicated   Hepatitis C Screening Once for adults born between 1945 and 1965  More frequently in patients at high risk for Hepatitis C Hep C Antibody: Not on file       Diabetes Screening 1-2 times per year if you're at risk for diabetes or have pre-diabetes Fasting glucose: 106 mg/dL   A1C: 5 3 %    Screening Current   Cholesterol Screening Once every 5 years if you don't have a lipid disorder  May order more often based on risk factors  Lipid panel: 09/09/2020    Screening Not Indicated  History Lipid Disorder     Other Preventive Screenings Covered by Medicare:  1  Abdominal Aortic Aneurysm (AAA) Screening: covered once if your at risk  You're considered to be at risk if you have a family history of AAA    2  Lung Cancer Screening: covers low dose CT scan once per year if you meet all of the following conditions: (1) Age 50-69; (2) No signs or symptoms of lung cancer; (3) Current smoker or have quit smoking within the last 15 years; (4) You have a tobacco smoking history of at least 30 pack years (packs per day multiplied by number of years you smoked); (5) You get a written order from a healthcare provider  3  Glaucoma Screening: covered annually if you're considered high risk: (1) You have diabetes OR (2) Family history of glaucoma OR (3)  aged 48 and older OR (3)  American aged 72 and older  3  Osteoporosis Screening: covered every 2 years if you meet one of the following conditions: (1) You're estrogen deficient and at risk for osteoporosis based off medical history and other findings; (2) Have a vertebral abnormality; (3) On glucocorticoid therapy for more than 3 months; (4) Have primary hyperparathyroidism; (5) On osteoporosis medications and need to assess response to drug therapy  · Last bone density test (DXA Scan): 12/04/2017  5  HIV Screening: covered annually if you're between the age of 12-76  Also covered annually if you are younger than 13 and older than 72 with risk factors for HIV infection  For pregnant patients, it is covered up to 3 times per pregnancy  Immunizations:  Immunization Recommendations   Influenza Vaccine Annual influenza vaccination during flu season is recommended for all persons aged >= 6 months who do not have contraindications   Pneumococcal Vaccine (Prevnar and Pneumovax)  * Prevnar = PCV13  * Pneumovax = PPSV23   Adults 25-60 years old: 1-3 doses may be recommended based on certain risk factors  Adults 72 years old: Prevnar (PCV13) vaccine recommended followed by Pneumovax (PPSV23) vaccine  If already received PPSV23 since turning 65, then PCV13 recommended at least one year after PPSV23 dose  Hepatitis B Vaccine 3 dose series if at intermediate or high risk (ex: diabetes, end stage renal disease, liver disease)   Tetanus (Td) Vaccine - COST NOT COVERED BY MEDICARE PART B Following completion of primary series, a booster dose should be given every 10 years to maintain immunity against tetanus  Td may also be given as tetanus wound prophylaxis     Tdap Vaccine - COST NOT COVERED BY MEDICARE PART B Recommended at least once for all adults  For pregnant patients, recommended with each pregnancy  Shingles Vaccine (Shingrix) - COST NOT COVERED BY MEDICARE PART B  2 shot series recommended in those aged 48 and above     Health Maintenance Due:  There are no preventive care reminders to display for this patient  Immunizations Due:      Topic Date Due    DTaP,Tdap,and Td Vaccines (1 - Tdap) 07/12/1962    Pneumococcal Vaccine: 65+ Years (2 of 2 - PPSV23) 11/13/2018     Advance Directives   What are advance directives? Advance directives are legal documents that state your wishes and plans for medical care  These plans are made ahead of time in case you lose your ability to make decisions for yourself  Advance directives can apply to any medical decision, such as the treatments you want, and if you want to donate organs  What are the types of advance directives? There are many types of advance directives, and each state has rules about how to use them  You may choose a combination of any of the following:  · Living will: This is a written record of the treatment you want  You can also choose which treatments you do not want, which to limit, and which to stop at a certain time  This includes surgery, medicine, IV fluid, and tube feedings  · Durable power of  for healthcare [place] [facility]): This is a written record that states who you want to make healthcare choices for you when you are unable to make them for yourself  This person, called a proxy, is usually a family member or a friend  You may choose more than 1 proxy  · Do not resuscitate (DNR) order:  A DNR order is used in case your heart stops beating or you stop breathing  It is a request not to have certain forms of treatment, such as CPR  A DNR order may be included in other types of advance directives  · Medical directive: This covers the care that you want if you are in a coma, near death, or unable to make decisions for yourself   You can list the treatments you want for each condition  Treatment may include pain medicine, surgery, blood transfusions, dialysis, IV or tube feedings, and a ventilator (breathing machine)  · Values history: This document has questions about your views, beliefs, and how you feel and think about life  This information can help others choose the care that you would choose  Why are advance directives important? An advance directive helps you control your care  Although spoken wishes may be used, it is better to have your wishes written down  Spoken wishes can be misunderstood, or not followed  Treatments may be given even if you do not want them  An advance directive may make it easier for your family to make difficult choices about your care  © Copyright [employer] 2018 Information is for End User's use only and may not be sold, redistributed or otherwise used for commercial purposes  All illustrations and images included in CareNotes® are the copyrighted property of [employer]  or [facility] Preventive Visit Patient Instructions  Thank you for completing your Welcome to Medicare Visit or Medicare Annual Wellness Visit today  Your next wellness visit will be due in one year (1/19/2022)  The screening/preventive services that you may require over the next 5-10 years are detailed below  Some tests may not apply to you based off risk factors and/or age  Screening tests ordered at today's visit but not completed yet may show as past due  Also, please note that scanned in results may not display below    Preventive Screenings:  Service Recommendations Previous Testing/Comments   Colorectal Cancer Screening  * Colonoscopy    * Fecal Occult Blood Test (FOBT)/Fecal Immunochemical Test (FIT)  * Fecal DNA/Cologuard Test  * Flexible Sigmoidoscopy Age: 54-65 years old   Colonoscopy: every 10 years (may be performed more frequently if at higher risk)  OR  FOBT/FIT: every 1 year  OR  Cologuard: every 3 years  OR  Sigmoidoscopy: every 5 years  Screening may be recommended earlier than age 48 if at higher risk for colorectal cancer  Also, an individualized decision between you and your healthcare provider will decide whether screening between the ages of 74-80 would be appropriate  Colonoscopy: Not on file  FOBT/FIT: Not on file  Cologuard: Not on file  Sigmoidoscopy: Not on file         Breast Cancer Screening Age: 36 years old  Frequency: every 1-2 years  Not required if history of left and right mastectomy Mammogram: 12/04/2017       Cervical Cancer Screening Between the ages of 21-29, pap smear recommended once every 3 years  Between the ages of 33-67, can perform pap smear with HPV co-testing every 5 years  Recommendations may differ for women with a history of total hysterectomy, cervical cancer, or abnormal pap smears in past  Pap Smear: Not on file    Screening Not Indicated   Hepatitis C Screening Once for adults born between 1945 and 1965  More frequently in patients at high risk for Hepatitis C Hep C Antibody: Not on file       Diabetes Screening 1-2 times per year if you're at risk for diabetes or have pre-diabetes Fasting glucose: 106 mg/dL   A1C: 5 3 %    Screening Current   Cholesterol Screening Once every 5 years if you don't have a lipid disorder  May order more often based on risk factors  Lipid panel: 09/09/2020    Screening Not Indicated  History Lipid Disorder     Other Preventive Screenings Covered by Medicare:  6  Abdominal Aortic Aneurysm (AAA) Screening: covered once if your at risk  You're considered to be at risk if you have a family history of AAA    7  Lung Cancer Screening: covers low dose CT scan once per year if you meet all of the following conditions: (1) Age 50-69; (2) No signs or symptoms of lung cancer; (3) Current smoker or have quit smoking within the last 15 years; (4) You have a tobacco smoking history of at least 30 pack years (packs per day multiplied by number of years you smoked); (5) You get a written order from a healthcare provider  8  Glaucoma Screening: covered annually if you're considered high risk: (1) You have diabetes OR (2) Family history of glaucoma OR (3)  aged 48 and older OR (3)  American aged 72 and older  5  Osteoporosis Screening: covered every 2 years if you meet one of the following conditions: (1) You're estrogen deficient and at risk for osteoporosis based off medical history and other findings; (2) Have a vertebral abnormality; (3) On glucocorticoid therapy for more than 3 months; (4) Have primary hyperparathyroidism; (5) On osteoporosis medications and need to assess response to drug therapy  · Last bone density test (DXA Scan): 12/04/2017   10  HIV Screening: covered annually if you're between the age of 15-65  Also covered annually if you are younger than 13 and older than 72 with risk factors for HIV infection  For pregnant patients, it is covered up to 3 times per pregnancy  Immunizations:  Immunization Recommendations   Influenza Vaccine Annual influenza vaccination during flu season is recommended for all persons aged >= 6 months who do not have contraindications   Pneumococcal Vaccine (Prevnar and Pneumovax)  * Prevnar = PCV13  * Pneumovax = PPSV23   Adults 25-60 years old: 1-3 doses may be recommended based on certain risk factors  Adults 72 years old: Prevnar (PCV13) vaccine recommended followed by Pneumovax (PPSV23) vaccine  If already received PPSV23 since turning 65, then PCV13 recommended at least one year after PPSV23 dose  Hepatitis B Vaccine 3 dose series if at intermediate or high risk (ex: diabetes, end stage renal disease, liver disease)   Tetanus (Td) Vaccine - COST NOT COVERED BY MEDICARE PART B Following completion of primary series, a booster dose should be given every 10 years to maintain immunity against tetanus  Td may also be given as tetanus wound prophylaxis     Tdap Vaccine - COST NOT COVERED BY MEDICARE PART B Recommended at least once for all adults  For pregnant patients, recommended with each pregnancy  Shingles Vaccine (Shingrix) - COST NOT COVERED BY MEDICARE PART B  2 shot series recommended in those aged 48 and above     Health Maintenance Due:  There are no preventive care reminders to display for this patient  Immunizations Due:      Topic Date Due    DTaP,Tdap,and Td Vaccines (1 - Tdap) 07/12/1962    Pneumococcal Vaccine: 65+ Years (2 of 2 - PPSV23) 11/13/2018     Advance Directives   What are advance directives? Advance directives are legal documents that state your wishes and plans for medical care  These plans are made ahead of time in case you lose your ability to make decisions for yourself  Advance directives can apply to any medical decision, such as the treatments you want, and if you want to donate organs  What are the types of advance directives? There are many types of advance directives, and each state has rules about how to use them  You may choose a combination of any of the following:  · Living will: This is a written record of the treatment you want  You can also choose which treatments you do not want, which to limit, and which to stop at a certain time  This includes surgery, medicine, IV fluid, and tube feedings  · Durable power of  for healthcare [place] [facility]): This is a written record that states who you want to make healthcare choices for you when you are unable to make them for yourself  This person, called a proxy, is usually a family member or a friend  You may choose more than 1 proxy  · Do not resuscitate (DNR) order:  A DNR order is used in case your heart stops beating or you stop breathing  It is a request not to have certain forms of treatment, such as CPR  A DNR order may be included in other types of advance directives  · Medical directive: This covers the care that you want if you are in a coma, near death, or unable to make decisions for yourself   You can list the treatments you want for each condition  Treatment may include pain medicine, surgery, blood transfusions, dialysis, IV or tube feedings, and a ventilator (breathing machine)  · Values history: This document has questions about your views, beliefs, and how you feel and think about life  This information can help others choose the care that you would choose  Why are advance directives important? An advance directive helps you control your care  Although spoken wishes may be used, it is better to have your wishes written down  Spoken wishes can be misunderstood, or not followed  Treatments may be given even if you do not want them  An advance directive may make it easier for your family to make difficult choices about your care  © Copyright [employer] 2018 Information is for End User's use only and may not be sold, redistributed or otherwise used for commercial purposes   All illustrations and images included in CareNotes® are the copyrighted property of [employer]  or [address] [place] [address] [place]  157.48

## 2021-01-29 PROBLEM — R91.8 PULMONARY NODULES: Status: ACTIVE | Noted: 2018-07-24

## 2021-01-29 PROBLEM — Z01.812 BLOOD TESTS PRIOR TO TREATMENT OR PROCEDURE: Status: ACTIVE | Noted: 2021-01-01

## 2021-02-12 NOTE — BRIEF OPERATIVE NOTE - ESTIMATED BLOOD LOSS
[facility] [address].  [address], [address]  [place], [address]  Phone: [phone]  Fax: [phone]        Renetta Martínezchelsea  : 1949  PCP: Isabela Jones [place]  2021    ELECTROMYOGRAPHY AND NERVE CONDUCTION STUDIES    Pete Dodson was referred by Dr. Denzel Linda Azar Borrego MD at [facility] 5-10-06    sessile polyp; Dr Karan Villaseñor    OR 2/12/2021    Procedure performed by:  Huber Villarreal MD    Provider accompanied by: Lola. Patient accompanied by: Self.     How tolerated by patient: tolerated the procedure well with no complications    Post Procedural Pain Scale: 0 - No Hurt    Comments: none    Written by Carina Gómez, [address] [facility] [address] as dictated by Sunita Viveros MD 20

## 2021-03-02 PROBLEM — J38.00 VOCAL CORD PARALYSIS: Status: ACTIVE | Noted: 2017-09-01

## 2021-03-02 PROBLEM — D64.9 ANEMIA: Status: ACTIVE | Noted: 2019-01-23

## 2021-04-16 NOTE — PHYSICAL EXAM
[Midline] : trachea located in midline position [Laryngoscopy Performed] : laryngoscopy was performed, see procedure section for findings [Normal] : no rashes [de-identified] : greatly improved

## 2021-04-16 NOTE — REASON FOR VISIT
[Subsequent Evaluation] : a subsequent evaluation for [FreeTextEntry2] : Three months follow up after throat procedure

## 2021-04-16 NOTE — HISTORY OF PRESENT ILLNESS
[de-identified] : s/p thyroplasty 12/14\par breathing getting better, dealing with obstruction of TA\par voice has been great, chrnic cough from pulm tumor\par possible bronch pending\par

## 2021-04-30 NOTE — PHYSICAL EXAM
[] : no respiratory distress [Auscultation Breath Sounds / Voice Sounds] : lungs were clear to auscultation bilaterally [Heart Rate And Rhythm] : heart rate was normal and rhythm regular [Heart Sounds] : normal S1 and S2 [Murmurs] : no murmurs [Heart Sounds Gallop] : no gallops [Heart Sounds Pericardial Friction Rub] : no pericardial rub [Examination Of The Chest] : the chest was normal in appearance [Chest Visual Inspection Thoracic Asymmetry] : no chest asymmetry [Diminished Respiratory Excursion] : normal chest expansion [Normal Rate] : the respiratory rate was normal [Normal Rhythm/Effort] : normal respiratory rhythm and effort [Wheezing Bilaterally] : wheezing was heard diffusely over both lungs

## 2021-04-30 NOTE — DATA REVIEWED
[FreeTextEntry1] : I have independently reviewed patient's CT on 3/29/21:\par CT Chest on 3/29/21: \par - Lt hilar adenopathy as well as fairly large portion of consolidation involving the left lung are noted, corresponding to the areas of FDG uptake on the patient's recent PET/CT scan of 1/25/2021. Causing marked narrowing of the mid to distal Lt mainstem bronchus as well as the TA bronchus. Possibility of recurrent disease is considered in the differential diagnosis\par - small loculated Lt pleural effusion\par

## 2021-04-30 NOTE — CONSULT LETTER
[Dear  ___] : Dear  [unfilled], [Consult Letter:] : I had the pleasure of evaluating your patient, [unfilled]. [( Thank you for referring [unfilled] for consultation for _____ )] : Thank you for referring [unfilled] for consultation for [unfilled] [Please see my note below.] : Please see my note below. [Consult Closing:] : Thank you very much for allowing me to participate in the care of this patient.  If you have any questions, please do not hesitate to contact me. [Sincerely,] : Sincerely, [FreeTextEntry2] : Dominick Portillo MD (Referring)\maura Berkowitz MD (Hem/Onc) \maura Chase MD (Rad/Onc)  [FreeTextEntry3] : Cleve Liang MD, MPH \par System Director of Thoracic Surgery \par Director of Comprehensive Lung and Foregut [place] \par Professor Cardiovascular & Thoracic Surgery  \par [facility] [employer] [facility]\par \par [facility]\par [address]\par Oncology [address]\[place] [address]\par Tel: [phone]\par Fax: [phone]\par

## 2021-04-30 NOTE — ASSESSMENT
[FreeTextEntry1] : 80 y/o F, Chinese speaking, never smoker, w/ hx of asthma and clinical Stg III SCCA. \par \par Now s/p Flex Bronch bx of TA bronchus, BAL, EBUS, core bx of the Lt paratracheal mass on 9/7/17. Path of Lt paratracheal mass showed atypical findings. TA core bx was non diagnostic.\par Now s/p Flex Broncho Cervical Mediastinoscopy on 9/19/17. Path revealed Metastatic Squamous cell CA to the Lt paratracheal mass; Lvl 4 and 7 LNs were negative for malignancy.\par \par Chemo/XRT with Dr. Jose Berkowitz and Dr. Chase, completed definitive chemo/XRT (60cGy in 30 fractions) in mid December 2017. Treated with maintenance Durvalumab x 1 year completed Feb 2019. \par \par Diagnostic Flex Bronch w/ BAL of TA bronchus, EBUS Bx of Lt hilar LN on 10/1/19. Path of Lt hilar LN was non diagnostic. Path of TA BAL negative for malignancy.\par \par She was last seen in office on 10/9/19, and I recommended CT guided biopsy of 3 cm left paramediastinal soft tissue mass with Dr. Rocha.\par FNA of Lt lung on 10/23/19.Path revealed +SCCA. +PD-L1 (3%).\par \par Patient was last seen in 2019 and has been followed by Hem/Onc Dr. Jose Berkowitz, started first line Crizotinib for metastatic disease in November 2019. Discontinued Crizotinib in late April 2020 secondary to severe esophagitis despite dose-reduction. Started 2nd line Capmatinib in late May 2020; achieved [place]. Dose increased in Jan 2021 in attempt at increased disease control. \par \par Patient is s/p Flexible Laryngoscopy, thyroplasty with Funk implant, pharyngoplasty left on 12/14/2020 by Dr. Negrito Bauer. \par \par PET/CT on 01/25/2021:\par - Left upper lobe paramediastinal/perihilar mass, difficult to delineate on CT, is unchanged in metabolism, and similar, or minimally increased in size. There is complete atelectasis of the left upper lobe, increased since prior study. New nonspecific, mild left apical pleural FDG avidity.\par - New focus of increased FDG activity adjacent to spine of scapula/soft tissue calcification, possibly inflammation.\par - Nonspecific, mild, diffuse esophageal FDG activity, and perianal hypermetabolism, unchanged.\par - Nonspecific, diffuse thyroid hypermetabolism, also unchanged, suggests thyroiditis.\par \par CT Chest on 3/29/21: \par - Lt hilar adenopathy as well as fairly large portion of consolidation involving the left lung are noted, corresponding to the areas of FDG uptake on the patient's recent PET/CT scan of 1/25/2021. Causing marked narrowing of the mid to distal Lt mainstem bronchus as well as the TA bronchus. Possibility of recurrent disease is considered in the differential diagnosis\par - small loculated Lt pleural effusion\par \par I have reviewed the patient's medical records and diagnostic images at time of this office consultation and have made the following recommendation:\par 1. PET/CT and CT chest reviewed and explained to patient and her daughter, I recommended a Flexible bronchoscopy, rigid bronchoscopy, Yag Laser, stent placement on 05/05/2021. Risks and benefits and alternatives explained to patient, all questions answered, patient agreed to proceed with surgery.\par 2. Medical clearance and PST. \par \par I personally performed the services described in the documentation, reviewed the documentation recorded by the scribe in my presence and it accurately and completely records my words and actions.\par \par I, Sophie tSout NP, am scribing for and the presence of VON Kamara, the following sections HISTORY OF PRESENT ILLNESS, PAST MEDICAL/FAMILY/SOCIAL HISTORY; REVIEW OF SYSTEMS; VITAL SIGNS; PHYSICAL EXAM; DISPOSITION.

## 2021-04-30 NOTE — REASON FOR VISIT
[Follow-Up: _____] : a [unfilled] follow-up visit [Patient Declined  Services] : - None: Patient declined  services [FreeTextEntry1] : Lung cancer [FreeTextEntry3] : Patient prefers her daughter to translate [TWNoteComboBox1] : Divehi

## 2021-04-30 NOTE — HISTORY OF PRESENT ILLNESS
[FreeTextEntry1] : 82 y/o F, Arabic speaking, never smoker, w/ hx of asthma and clinical Stg III SCCA. \par \par Chest CT from 8/30/17 revealed a 5.6 x 3.9 x 4.9 cm LEFT upper lobe mass extending into the TA, also the mediastinum, inseparable from the aortic arch & descending thoracic aorta. Also extending to the AP window region, with mild narrowing of the Left mainstem bronchus; inseparable from the hilum, with marked narrowing & possible occlusion of the Left main pulmonary artery. 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 Path of Lt paratracheal mass showed atypical findings. TA core bx was non diagnostic.\par Now s/p Flex Broncho Cervical Mediastinoscopy on 9/19/17. Path revealed Metastatic Squamous cell CA to the Lt paratracheal mass; Lvl 4 and 7 LNs were negative for malignancy.\par \par Chemo/XRT with Dr. Jose Berkowitz and Dr. Chase, completed definitive chemo/XRT (60cGy in 30 fractions) in mid December 2017. Treated with maintenance Durvalumab x 1 year completed Feb 2019. \par \par PET/CT on 9/13/19:\par - diffusely increased FDG avidity within the enlarged thyroid gland, SUV=7.8\par - FDG avid focus right maxillary incisor, probably periodontal disease, SUV= 4.4\par - FDG avid soft tissue left paramediastinal region, measuring 3.0 cm, SUV=18.2, suspicious for recurrent disease\par - small left hilar focus, just posterior to the left mainstem bronchus, SUV=13.2 \par - post-radiation changes\par - stable small left pleural effusion\par \par Diagnostic Flex Bronch w/ BAL of TA bronchus, EBUS Bx of Lt hilar LN on 10/1/19. Path of Lt hilar LN was non diagnostic. Path of TA BAL negative for malignancy.\par \par She was last seen in office on 10/9/19, and I recommended CT guided biopsy of 3 cm left paramediastinal soft tissue mass with Dr. Rocha.\par FNA of Lt lung on 10/23/19.Path revealed +SCCA. +PD-L1 (3%).\par \par Patient was last seen in 2019 and has been followed by Hem/Onc Dr. Jose Berkowitz, started first line Crizotinib for metastatic disease in November 2019. Discontinued Crizotinib in late April 2020 secondary to severe esophagitis despite dose-reduction. Started 2nd line Capmatinib in late May 2020; achieved [place]. Dose increased in Jan 2021 in attempt at increased disease control. \par \par Patient is s/p Flexible Laryngoscopy, thyroplasty with Funk implant, pharyngoplasty left on 12/14/2020 by Dr. Negrito Bauer. \par \par PET/CT on 01/25/2021:\par - Left upper lobe paramediastinal/perihilar mass, difficult to delineate on CT, is unchanged in metabolism, and similar, or minimally increased in size. There is complete atelectasis of the left upper lobe, increased since prior study. New nonspecific, mild left apical pleural FDG avidity.\par - New focus of increased FDG activity adjacent to spine of scapula/soft tissue calcification, possibly inflammation.\par - Nonspecific, mild, diffuse esophageal FDG activity, and perianal hypermetabolism, unchanged.\par - Nonspecific, diffuse thyroid hypermetabolism, also unchanged, suggests thyroiditis.\par \par CT Chest on 3/29/21: \par - Lt hilar adenopathy as well as fairly large portion of consolidation involving the left lung are noted, corresponding to the areas of FDG uptake on the patient's recent PET/CT scan of 1/25/2021. Causing marked narrowing of the mid to distal Lt mainstem bronchus as well as the TA bronchus. Possibility of recurrent disease is considered in the differential diagnosis\par - small loculated Lt pleural effusion\par \par Patient is here today for a follow up for a Bronchoscopy, laser debulking and possible stent. Patient c/o cough, SOB, denies chest pain, fever, chills.

## 2021-05-03 NOTE — H&P PST ADULT - NSICDXPASTSURGICALHX_GEN_ALL_CORE_FT
PAST SURGICAL HISTORY:  H/O colonoscopy     H/O vocal cord paralysis filter injection 2018    History of laryngoscopy thyroplasty with manning implant pharyngoplasty    History of lung biopsy 2017

## 2021-05-03 NOTE — H&P PST ADULT - NSICDXPROBLEM_GEN_ALL_CORE_FT
PROBLEM DIAGNOSES  Problem: Malignant neoplasm of lung  Assessment and Plan:        PROBLEM DIAGNOSES  Problem: Malignant neoplasm of lung  Assessment and Plan: Pt is scheduled for flexible bronchoscopy, possible rigid bronchoscopy, yag laser, bronchial stent placement on 5/5/21.   Verbal and written pre op instructions reviewed with patient and pt able to verbalize understanding.   Verbal and written instructions given with chlorhexidine wash, pt able to verbalize understanding via teach back method. COVID testing scheduled preop per pt.   Pt instructed to take tecrecta AM of surgery with a sip of water, pt able to verbalize understanding.     Problem: HTN (hypertension)  Assessment and Plan: Pt instructed to take amlodipine AM of surgery with a sip of water, pt able to verbalize understanding.   Pt met STOP BANG score for DEION precaution. OR booking notified via fax.     Problem: GERD (gastroesophageal reflux disease)  Assessment and Plan: Pt instructed to take omeprazole AM of surgery with a sip of water, pt able to verbalize understanding.     Problem: Atrial fibrillation  Assessment and Plan: Pt to obtain cardiac eval as requested by surgeon, will request document.     Problem: Hypothyroidism  Assessment and Plan: Pt instructed to take synthroid AM of surgery with a sip of water, pt able to verbalize understanding.     Problem: Asthma  Assessment and Plan: Pt instructed to take nebs/inhalers as prescribed.

## 2021-05-03 NOTE — H&P PST ADULT - NEGATIVE GENERAL GENITOURINARY SYMPTOMS
no hematuria/no flank pain L/no flank pain R/no bladder infections/no incontinence/no dysuria/normal urinary frequency

## 2021-05-03 NOTE — H&P PST ADULT - RS GEN PE MLT RESP DETAILS PC
airway patent/breath sounds equal/good air movement/respirations non-labored/clear to auscultation bilaterally right lung fields clear, left sided inspiratory and expiratory wheezing/airway patent/breath sounds equal/good air movement/respirations non-labored

## 2021-05-03 NOTE — H&P PST ADULT - MUSCULOSKELETAL
no joint swelling/no joint erythema/no joint warmth/no calf tenderness/normal strength detailed exam details…

## 2021-05-03 NOTE — H&P PST ADULT - CARDIOVASCULAR COMMENTS
"occasional chest pain when I sleep for 4-5 months, the doctor told me it may be because my lung is collapsed" "occasional chest pain when I sleep x 4-5 months, the doctor told me it may be because my lung is collapsed" no irregularity noted on exam today

## 2021-05-03 NOTE — H&P PST ADULT - HISTORY OF PRESENT ILLNESS
82 yo female with preop dx. malignant neoplasm of lung presents to pre surgical testing.  Pt with h/o lung cancer s/p chemoRT.  Pt with c/o worsening cough and SOB, CT revealing narrowing of left mainstem and TA bronchus.  Pt is scheduled for flexible bronchoscopy, possible rigid bronchoscopy, yag laser, bronchial stent placement.

## 2021-05-03 NOTE — H&P PST ADULT - NSICDXPASTMEDICALHX_GEN_ALL_CORE_FT
PAST MEDICAL HISTORY:  Asthma     Atrial fibrillation dx.  3/29/21, to start anticoagulation post procedure per cardiology per pt    GERD (gastroesophageal reflux disease)     Hypertension     Hypothyroidism     Lung cancer left upper lobe - received CHEMO RT and immunotherapy    Other nonspecific abnormal finding of lung field     Paralysis of vocal cords

## 2021-05-05 NOTE — ASU PATIENT PROFILE, ADULT - PSH
H/O colonoscopy    H/O vocal cord paralysis  filter injection 2018  History of laryngoscopy  thyroplasty with manning implant pharyngoplasty  History of lung biopsy  2017

## 2021-05-05 NOTE — BRIEF OPERATIVE NOTE - OPERATION/FINDINGS
EGD with tumor at 24-27cm from the incisors; Biopsy taken; Flexible bronchoscopy with tumor in the distal left main and unable to visualize patent airway distal to tumor; YAG laser ablation of tumor with good response

## 2021-05-05 NOTE — BRIEF OPERATIVE NOTE - NSICDXBRIEFPROCEDURE_GEN_ALL_CORE_FT
PROCEDURES:  Bronchoscopy, flexible, by CT surgery 05-May-2021 13:25:59  Nicola Reilly  Bronchoscopy, flexible, with YAG laser ablation 05-May-2021 13:26:10 Left main bronchus Nicola Reilly  EGD with biopsy 05-May-2021 13:27:12  Nicola Reilly

## 2021-05-05 NOTE — ASU PATIENT PROFILE, ADULT - PMH
Asthma    Atrial fibrillation  dx.  3/29/21, to start anticoagulation post procedure per cardiology per pt  GERD (gastroesophageal reflux disease)    Hypertension    Hypothyroidism    Lung cancer  left upper lobe - received CHEMO RT and immunotherapy  Other nonspecific abnormal finding of lung field    Paralysis of vocal cords

## 2021-05-05 NOTE — ASU DISCHARGE PLAN (ADULT/PEDIATRIC) - NURSING INSTRUCTIONS
Watch for signs of infection; fever, chills  report such symptoms to the MD.  Report excessive bleeding, may experience blood tinged sputum while coughing but if excessive, notify the MD. . Drink 6-8 glasses of fluids daily to promote hydration. No heavy lifting, pulling or pushing heavy objects. Follow up with primary & surgical MD.

## 2021-05-11 PROBLEM — I48.91 UNSPECIFIED ATRIAL FIBRILLATION: Chronic | Status: ACTIVE | Noted: 2021-01-01

## 2021-05-11 PROBLEM — J45.909 UNSPECIFIED ASTHMA, UNCOMPLICATED: Chronic | Status: ACTIVE | Noted: 2017-09-05

## 2021-05-11 PROBLEM — C34.90 MALIGNANT NEOPLASM OF UNSPECIFIED PART OF UNSPECIFIED BRONCHUS OR LUNG: Chronic | Status: ACTIVE | Noted: 2017-12-15
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
## 2021-08-12 PROBLEM — E03.9 HYPOTHYROIDISM, UNSPECIFIED TYPE: Status: ACTIVE | Noted: 2017-09-01

## 2021-08-24 NOTE — CONSULT LETTER
[Dear  ___] : Dear  [unfilled], [Consult Letter:] : I had the pleasure of evaluating your patient, [unfilled]. [Please see my note below.] : Please see my note below. [Consult Closing:] : Thank you very much for allowing me to participate in the care of this patient.  If you have any questions, please do not hesitate to contact me. [Sincerely,] : Sincerely, [FreeTextEntry3] : Negrito Bauer MD, PhD\par Chief, Division of Laryngology\par Department of Otolaryngology\par [facility]\par Pediatric Otolaryngology, [facility]\par  of Otolaryngology\par [facility]\par \par \par

## 2021-08-24 NOTE — HISTORY OF PRESENT ILLNESS
[de-identified] : s/p thyroplasty 12/14, voice has been strong but aspiration symptoms\par breathing getting better, dealing with obstruction of TA. Now s/p two rounds of chemo. \par Voice has been great,still with dysphagia but refusing to do swallow therapy. \par No recent swallow testing\par

## 2021-08-27 PROBLEM — R13.10 DYSPHAGIA: Status: ACTIVE | Noted: 2017-12-07

## 2021-08-27 PROBLEM — K21.9 GERD (GASTROESOPHAGEAL REFLUX DISEASE): Status: ACTIVE | Noted: 2017-10-13

## 2021-09-09 PROBLEM — B37.81 THRUSH OF MOUTH AND ESOPHAGUS: Status: ACTIVE | Noted: 2021-01-01

## 2021-09-09 NOTE — HISTORY OF PRESENT ILLNESS
[de-identified] : s/p thyroplasty 12/14, voice has been strong but aspiration symptoms intermittently, having trouble with solids more than liquids\par breathing getting better, dealing with obstruction of TA. Now s/p two rounds of chemo. \par Voice has been great,still with dysphagia\par No recent swallow testing\par taking famotidine almost every day, usually once per day\par

## 2021-09-09 NOTE — CONSULT LETTER
[Dear  ___] : Dear  [unfilled], [Consult Letter:] : I had the pleasure of evaluating your patient, [unfilled]. [Please see my note below.] : Please see my note below. [Consult Closing:] : Thank you very much for allowing me to participate in the care of this patient.  If you have any questions, please do not hesitate to contact me. [Sincerely,] : Sincerely, [FreeTextEntry3] : Negrito Bauer MD, PhD\par Chief, Division of Laryngology\par Department of Otolaryngology\par [facility]\par Pediatric Otolaryngology, [facility]\par  of Otolaryngology\par [facility]\par \par \par

## 2021-09-15 PROBLEM — Z51.11 ENCOUNTER FOR ANTINEOPLASTIC CHEMOTHERAPY: Status: ACTIVE | Noted: 2021-01-01

## 2021-09-23 PROBLEM — L30.9 DERMATITIS: Status: ACTIVE | Noted: 2018-07-24

## 2021-09-23 PROBLEM — I48.91 ATRIAL FIBRILLATION, UNSPECIFIED TYPE: Status: ACTIVE | Noted: 2021-01-01

## 2021-09-23 PROBLEM — R42 DIZZINESS: Status: ACTIVE | Noted: 2021-01-01

## 2021-09-28 NOTE — HISTORY OF PRESENT ILLNESS
[Home] : at home, [unfilled] , at the time of the visit. [Medical Office: ([facility])___] : at the medical office located in  [Verbal consent obtained from patient] : the patient, [unfilled] [Other:____] : [unfilled] [FreeTextEntry1] : 81yoF with NSCLC presents for initial palliative care visit, referred by Oncology.\par PMH also significant for Afib on Eliquis, \par Patient's daughter Marita provides Sami translation, per patient request.\par \par In 2016 pt experienced a cough that was not resolving after its usual two-month course. She saw her doctor and was sent for a chest x-ray in October which was reportedly negative and in March/April 2017, she developed hoarseness. She saw her pulmonologist and was prescribed nebulizers. In 8/2017 she lost her voice, saw ENT and underwent a laryngoscopy revealing vocal cord paralysis. She was referred for CT scan of her chest that revealed a large TA mass with involvement of the recurrent laryngeal nerve causing the vocal cord paralysis. She underwent bronchoscopy with biopsy that was nondiagnostic. Underwent mediastinoscopy revealing squamous cell carcinoma. Completed concurrent chemo/RT with weekly Carbo/Taxol in mid-December 2017. Restaging CT Chest in late 1/2018 revealed CA. Started maintenance Durvalumab in 2/2018 and completed treatment x 1 year in 2/2019. Monitored off treatment since late 2/2019. Surveillance CT Chest in 9/2019 with TA paramediastinal and paraspinal opacity with interval progression since 1/2019 superimposed on RT changes which may represent tumor recurrence. Underwent bronch by Dr. Liang on 10/1 in which a 3-4cm left hilar mass was visualized. TA BAL revealed atypical findings; the left hilar biopsy was non-diagnostic. PET/CT performed in September was concerning for recurrence in the left hilar/paramediastinal region. Underwent CT-guided left lung biopsy on 10/23/19 that was positive for squamous cell carcinoma. Started first line Crizotinib for metastatic disease in 11/2019.  PET/CT performed in 1/2020 revealed [id number] findings. F/U Brain MRI in 2/2020 revealed no change from prior. Metastatic disease is not suspected. She discontinued Crizotinib in 4/2020 secondary to severe esophagitis despite dose-reduction. Started 2nd line Capmatinib in 5/2020; achieved CA. Dose increased in 1/2021 in attempt at increased disease control. Case was reviewed at tumor board and disease was felt to be overall stable from prior however. Restaging CT in late March appeared to show increased obstruction/atelectasis of the TA possibly related to increased secretions. She underwent Bronch on 5/5/21. Bronchoscopy revealed esophageal ulceration and tumor in the distal left main bronchus for which laser ablation was performed. Esophageal biopsies revealed ulcer and necrotic inflamed tissue. Left mainstem bronchus revealed her known poorly differentiated squamous cell carcinoma. Restaging PET/CT at that time with overall stable findings from prior radiologically, however given the bronchoscopic findings, systemic therapy changed to 3rd line Tepotinib (Tepmetko) in 5/2021 with disease progression in primary tumor evident on scan in 7/2021. Discontinued Tepmetko the day prior to start of 4th line Gemcitabine in 8/2021. \par \par Main issues for which patient presents today are anxiety, insomnia and loss of appetite. \par \par ROS:\par +cough - sometimes productive of phlegm, sometimes blood streaked. Uses hycodan PRN which helps. Uses sparingly during the day. Occasional has post-tussive emesis\par +occasional SOB\par +lower abdominal cramping - usually relieved with pepto bismol. \par +weight loss of about 10lbs in the last 2 months. Patient has low appetite. Daughter would like to discuss incorporation of medicinal cannabis for appetite stimulation\par +dysphagia to solids, has to puree some foods in order to ingest them\par +fatigue\par +weakness\par +trouble sleeping - wakes up frequently. Has been feeling anxious about choking while lying down. Has not had a choking episode in the past. \par +oral thrush - using nystatin s/s\par +occasional oral ulcers - uses magic mouthwash PRN\par +checks BP daily, has had low BP readings (down to 60s/40s in August??) so she discontinued cardizem\par \par Patient is , lives with her  in a 2 bedroom apartment. She has 3 daughters who live nearby and help out. \par \par PMD: Dr. Jose Liang \par \par I-Stop Ref#: [id number]

## 2021-09-28 NOTE — HISTORY OF PRESENT ILLNESS
[Home] : at home, [unfilled] , at the time of the visit. [Medical Office: ([facility])___] : at the medical office located in  [Verbal consent obtained from patient] : the patient, [unfilled] [Other:____] : [unfilled] [FreeTextEntry1] : 81yoF with NSCLC presents for initial palliative care visit, referred by Oncology.\par PMH also significant for Afib on Eliquis, \par Patient's daughter Marita provides English translation, per patient request.\par \par In 2016 pt experienced a cough that was not resolving after its usual two-month course. She saw her doctor and was sent for a chest x-ray in October which was reportedly negative and in March/April 2017, she developed hoarseness. She saw her pulmonologist and was prescribed nebulizers. In 8/2017 she lost her voice, saw ENT and underwent a laryngoscopy revealing vocal cord paralysis. She was referred for CT scan of her chest that revealed a large TA mass with involvement of the recurrent laryngeal nerve causing the vocal cord paralysis. She underwent bronchoscopy with biopsy that was nondiagnostic. Underwent mediastinoscopy revealing squamous cell carcinoma. Completed concurrent chemo/RT with weekly Carbo/Taxol in mid-December 2017. Restaging CT Chest in late 1/2018 revealed AK. Started maintenance Durvalumab in 2/2018 and completed treatment x 1 year in 2/2019. Monitored off treatment since late 2/2019. Surveillance CT Chest in 9/2019 with TA paramediastinal and paraspinal opacity with interval progression since 1/2019 superimposed on RT changes which may represent tumor recurrence. Underwent bronch by Dr. Liang on 10/1 in which a 3-4cm left hilar mass was visualized. TA BAL revealed atypical findings; the left hilar biopsy was non-diagnostic. PET/CT performed in September was concerning for recurrence in the left hilar/paramediastinal region. Underwent CT-guided left lung biopsy on 10/23/19 that was positive for squamous cell carcinoma. Started first line Crizotinib for metastatic disease in 11/2019.  PET/CT performed in 1/2020 revealed [id number] findings. F/U Brain MRI in 2/2020 revealed no change from prior. Metastatic disease is not suspected. She discontinued Crizotinib in 4/2020 secondary to severe esophagitis despite dose-reduction. Started 2nd line Capmatinib in 5/2020; achieved AK. Dose increased in 1/2021 in attempt at increased disease control. Case was reviewed at tumor board and disease was felt to be overall stable from prior however. Restaging CT in late March appeared to show increased obstruction/atelectasis of the TA possibly related to increased secretions. She underwent Bronch on 5/5/21. Bronchoscopy revealed esophageal ulceration and tumor in the distal left main bronchus for which laser ablation was performed. Esophageal biopsies revealed ulcer and necrotic inflamed tissue. Left mainstem bronchus revealed her known poorly differentiated squamous cell carcinoma. Restaging PET/CT at that time with overall stable findings from prior radiologically, however given the bronchoscopic findings, systemic therapy changed to 3rd line Tepotinib (Tepmetko) in 5/2021 with disease progression in primary tumor evident on scan in 7/2021. Discontinued Tepmetko the day prior to start of 4th line Gemcitabine in 8/2021. \par \par Main issues for which patient presents today are anxiety, insomnia and loss of appetite. \par \par ROS:\par +cough - sometimes productive of phlegm, sometimes blood streaked. Uses hycodan PRN which helps. Uses sparingly during the day. Occasional has post-tussive emesis\par +occasional SOB\par +lower abdominal cramping - usually relieved with pepto bismol. \par +weight loss of about 10lbs in the last 2 months. Patient has low appetite. Daughter would like to discuss incorporation of medicinal cannabis for appetite stimulation\par +dysphagia to solids, has to puree some foods in order to ingest them\par +fatigue\par +weakness\par +trouble sleeping - wakes up frequently. Has been feeling anxious about choking while lying down. Has not had a choking episode in the past. \par +oral thrush - using nystatin s/s\par +occasional oral ulcers - uses magic mouthwash PRN\par +checks BP daily, has had low BP readings (down to 60s/40s in August??) so she discontinued cardizem\par \par Patient is , lives with her  in a 2 bedroom apartment. She has 3 daughters who live nearby and help out. \par \par PMD: Dr. Jose Liang \par \par I-Stop Ref#: [id number]

## 2021-09-28 NOTE — DATA REVIEWED
[FreeTextEntry1] : PET/CT (7/26/21): COMPARISON: FDG PET CT dated 5/24/2021.\par HEAD/NECK: Physiologic FDG activity in visualized brain.\par \par Small focus of increased FDG activity in right maxillary alveolus, unchanged, likely related to dental disease (SUV 4.1; image 34; previous SUV 4.2).\par \par Mild, diffuse thyroid hypermetabolism, unchanged (SUV 5.8; image 66; previous SUV 7.8).\par \par THORAX/LUNGS: Large area of difficult to delineate hypermetabolism in left upper lobe paramediastinal/perihilar region, slightly more extensive and unchanged in metabolism (SUV 26.5; image 83; previous SUV 24.2). Hypermetabolism surrounds the left mainstem bronchus and left upper lobe and left lower lobe bronchi with associated narrowing of the airways which is increased as compared to prior PET/CT. Partial atelectasis of the left upper lobe is mildly increased. No abnormal FDG activity in right lung. No nodule.\par \par PLEURA/PERICARDIUM: Mildly FDG-avid left apical pleural thickening, unchanged (SUV 4.7; image 69; previous SUV 5.5). Small, loculated left pleural effusion, mildly increased. No right pleural effusion. New trace to small pericardial effusion.\par \par HEPATOBILIARY/PANCREAS: Physiologic FDG activity. Liver SUV mean is 2.3; previously 2.9.\par \par SPLEEN: Physiologic FDG activity. Normal in size.\par \par ADRENAL GLANDS: No abnormal FDG activity. No nodule.\par \par KIDNEYS/URINARY BLADDER: Physiologic FDG activity.\par \par ABDOMINOPELVIC NODES: No enlarged or FDG-avid lymph node.\par \par BOWEL/PERITONEUM/MESENTERY: Perianal hypermetabolism, unchanged (SUV 10.3; image 243; previous SUV 8.4). Resolution of mild, diffuse esophageal FDG activity.\par \par PELVIC ORGANS: No abnormal FDG activity. Intrauterine device, unchanged.\par \par BONES: Physiologic FDG activity.\par \par IMPRESSION: Compared to FDG PET/CT dated 5/24/2021:\par 1. Large area of difficult to delineate hypermetabolism in left upper lobe paramediastinal/perihilar region is mildly increased in size and is unchanged in metabolism. Hypermetabolism surrounds the left mainstem bronchus and left upper lobe and left lower lobe bronchi with associated narrowing of the airways which is increased. Partial atelectasis of the left upper lobe is mildly increased. Nonspecific, mild left apical pleural thickening is unchanged.\par \par 2. Resolution of nonspecific, mild, diffuse esophageal FDG activity. Nonspecific perirenal hypermetabolism is unchanged.\par \par 3. Nonspecific, diffuse thyroid hypermetabolism, unchanged, suggests thyroiditis.\par \par 4. No new lesion.

## 2021-09-28 NOTE — ASSESSMENT
[FreeTextEntry1] : 81yoF with:\par \par 1. NSCLC - On Gemcitabine; follow up with Med Onc.\par \par 2. Loss of appetite - \par Medical cannabis certification completed today. Provided cannabis education, overview of state program, and discussed adverse effects in detail. Counseled that vaporized cannabis is not the preferred route of administration due to the fact that both short-term and long-term risks associated with vaporizing oils are not yet fully understood. Recommend starting with 1:1 THC:CBD formulation at low dose of THC (~2mg/dose).\par \par 3. Cough - c/w PRN Hycodan syrup.\par \par 4. Encounter for Palliative Care - Emotional support provided.\par No HCP form on file, patient's daughter states her sister is primary HCP and she is secondary HCP.\par \par Follow up in 1 month, call sooner with questions or issues. [______] : HCP: [unfilled]

## 2021-10-08 PROBLEM — C77.1 SECONDARY MALIGNANT NEOPLASM OF BRONCHOPULMONARY LYMPH NODES: Status: ACTIVE | Noted: 2017-10-02

## 2021-10-08 PROBLEM — D50.9 IRON DEFICIENCY ANEMIA, UNSPECIFIED IRON DEFICIENCY ANEMIA TYPE: Status: ACTIVE | Noted: 2021-01-01

## 2021-10-11 PROBLEM — R05.3 CHRONIC COUGH: Status: ACTIVE | Noted: 2021-01-01

## 2021-10-11 PROBLEM — C34.12 MALIGNANT NEOPLASM OF UPPER LOBE OF LEFT LUNG: Status: ACTIVE | Noted: 2017-10-02

## 2021-10-11 PROBLEM — Z51.5 ENCOUNTER FOR PALLIATIVE CARE: Status: ACTIVE | Noted: 2021-01-01

## 2021-10-11 PROBLEM — R63.0 APPETITE LOSS: Status: ACTIVE | Noted: 2021-01-01

## 2021-10-11 NOTE — HISTORY OF PRESENT ILLNESS
[Home] : at home, [unfilled] , at the time of the visit. [Medical Office: ([facility])___] : at the medical office located in  [Other:____] : [unfilled] [Verbal consent obtained from patient] : the patient, [unfilled] [FreeTextEntry1] : 81yoF with NSCLC presents for follow up visit. \par PMH also significant for Afib on Eliquis, \par Patient's daughter Marita provides Ukrainian translation, per patient request.\par \par In 2016 pt experienced a cough that was not resolving after its usual two-month course. She saw her doctor and was sent for a chest x-ray in October which was reportedly negative and in March/April 2017, she developed hoarseness. She saw her pulmonologist and was prescribed nebulizers. In 8/2017 she lost her voice, saw ENT and underwent a laryngoscopy revealing vocal cord paralysis. She was referred for CT scan of her chest that revealed a large TA mass with involvement of the recurrent laryngeal nerve causing the vocal cord paralysis. She underwent bronchoscopy with biopsy that was nondiagnostic. Underwent mediastinoscopy revealing squamous cell carcinoma. Completed concurrent chemo/RT with weekly Carbo/Taxol in mid-December 2017. Restaging CT Chest in late 1/2018 revealed OR. Started maintenance Durvalumab in 2/2018 and completed treatment x 1 year in 2/2019. Monitored off treatment since late 2/2019. Surveillance CT Chest in 9/2019 with TA paramediastinal and paraspinal opacity with interval progression since 1/2019 superimposed on RT changes which may represent tumor recurrence. Underwent bronch by Dr. Liang on 10/1 in which a 3-4cm left hilar mass was visualized. TA BAL revealed atypical findings; the left hilar biopsy was non-diagnostic. PET/CT performed in September was concerning for recurrence in the left hilar/paramediastinal region. Underwent CT-guided left lung biopsy on 10/23/19 that was positive for squamous cell carcinoma. Started first line Crizotinib for metastatic disease in 11/2019.  PET/CT performed in 1/2020 revealed [id number] findings. F/U Brain MRI in 2/2020 revealed no change from prior. Metastatic disease is not suspected. She discontinued Crizotinib in 4/2020 secondary to severe esophagitis despite dose-reduction. Started 2nd line Capmatinib in 5/2020; achieved OR. Dose increased in 1/2021 in attempt at increased disease control. Case was reviewed at tumor board and disease was felt to be overall stable from prior however. Restaging CT in late March appeared to show increased obstruction/atelectasis of the TA possibly related to increased secretions. She underwent Bronch on 5/5/21. Bronchoscopy revealed esophageal ulceration and tumor in the distal left main bronchus for which laser ablation was performed. Esophageal biopsies revealed ulcer and necrotic inflamed tissue. Left mainstem bronchus revealed her known poorly differentiated squamous cell carcinoma. Restaging PET/CT at that time with overall stable findings from prior radiologically, however given the bronchoscopic findings, systemic therapy changed to 3rd line Tepotinib (Tepmetko) in 5/2021 with disease progression in primary tumor evident on scan in 7/2021. Discontinued Tepmetko the day prior to start of 4th line Gemcitabine in 8/2021. \par \par Main issues for which patient presents today are anxiety, insomnia and loss of appetite. \par \par Interval Hx (10/11/21): Pt seen via telemedicine. \par Patient met with Oncology last week and made the decision to not pursue any further cancer treatments after her recent PET scan showed POD. Wishes to focus on management of symptoms.\par \par Has been using medical cannabis tincture in 1:1 THC:CBD which she uses during the day, high THC:low CBD which she uses at night.\par Daughter reports that pt has been sleeping much better with the nighttime cannabis. no AEs from the cannabis. Not sure if the 1:1 helping with appetite.\par \par ROS:\par +cough - improved lately; not using hycodan regularly\par +occasional SOB\par +lower abdominal cramping - usually relieved with pepto bismol. \par +dysphagia to solids, has to puree some foods in order to ingest them\par +fatigue\par +weakness\par +occasional oral ulcers - uses magic mouthwash PRN\par +checks BP daily, has had low BP readings (down to 60s/40s in August??) so she discontinued cardizem\par +mild nausea, feels it has gotten a bit better since stopping cancer treatment. \par +constipation - did not have a BM for about a week, had a loose BM this AM and feels better\par \par Patient is , lives with her  in a 2 bedroom apartment. She has 3 daughters who live nearby and help out. \par \par PMD: Dr. Jose Liang \par \par I-Stop Ref#: [id number]

## 2021-10-11 NOTE — DATA REVIEWED
[FreeTextEntry1] : PET/CT (10/4/21): COMPARISON: PET/CT July 26, 2021\par FINDINGS:\par \par HEAD/NECK: Small FDG avid focus right maxillary alveolus (SUV 4.2; image 28), previous SUV 4.1. Unchanged mild diffuse thyroid FDG avidity (SUV 6.0; image 55), previous SUV 5.8. Physiologic FDG activity seen in the visualized portions of the brain, major salivary glands and neck muscles.\par \par THORAX: Large area of difficult to delineate hypermetabolism in left upper lobe paramediastinal/perihilar region, this is difficult to measure but appears more extensive (SUV 35.0; image 65), previously with SUV 26.5. FDG avidity surrounds the left mainstem, left upper and lower lobe bronchi with associated narrowing of the left mainstem, unchanged. G avidity extends into the left paratracheal/subcarinal region (SUV 25.5; image 73), previous SUV 26.5.\par \par LUNGS: Partial atelectasis of the left upper lobe is unchanged. No abnormal FDG avidity in right lung.\par \par PLEURA/PERICARDIUM: Increased FDG avid pleural thickening in the left medial hemithorax (SUV 9.7; image 88), previous SUV 4.1. Increased FDG avid left apical pleural thickening (SUV 9.3; image 56) previous SUV 4.7. Unchanged small loculated left pleural effusion. No right pleural effusion. Unchanged small pericardial effusion.\par \par HEPATOBILIARY/PANCREAS: Liver background SUV mean as a reference for comparing studies is 2.9, previously 2.5.\par \par SPLEEN: No abnormal FDG avidity.\par \par ADRENAL GLANDS: No abnormal avidity.\par \par KIDNEYS/URINARY BLADDER: Excreted activity is seen. No abnormal avidity.\par \par ABDOMINOPELVIC NODES: No abnormal avidity.\par \par BOWEL/PERITONEUM/MESENTERY: No abnormal avidity. Unchanged perianal FDG avidity (SUV 8.6; image 228), previous SUV 10.3.\par \par PELVIC ORGANS: No abnormal avidity. Intrauterine device, unchanged.\par \par BONES: No abnormal avidity.\par \par SOFT TISSUES: FDG avid intramuscular focus in the right upper arm probably within the upper biceps (SUV 4.2; image 38)\par \par IMPRESSION:\par Since PET/CT July 26, 2021:\par 1. Increased size/extent and FDG avidity of large difficult to delineate soft tissue left upper lobe/paramediastinal region extending into the perihilar and subcarinal region, concerning for progression of disease.\par 2. New or increased FDG avid left pleural thickening.\par 3. New FDG avid intramuscular focus right upper arm, indeterminate.\par 4. Unchanged diffuse thyroid and perianal FDG avidity.

## 2021-10-11 NOTE — ASSESSMENT
[______] : HCP: [unfilled] [FreeTextEntry1] : 81yoF with:\par \par 1. NSCLC - Off of disease modifying therapies.  \par \par 2. Loss of appetite /trouble sleeping - \par c/w medical cannabis regimen. \par \par 3. Cough - c/w PRN Hycodan syrup.\par \par 4. Encounter for Palliative Care - Emotional support provided.\par No HCP form on file, patient's daughter states her sister is primary HCP and she is secondary HCP.\par \par Hospice benefit discussed in detail, answered all questions to patient's apparent satisfaction.  They are not yet ready to sign up, will continue to follow patient regularly.\par \par Follow up in 1 month, call sooner with questions or issues.

## 2021-11-05 NOTE — HISTORY OF PRESENT ILLNESS
[FreeTextEntry1] : 81yoF with NSCLC presents for follow up visit. \par PMH also significant for Afib on Eliquis, \par Patient's daughter Marita provides Kyrgyz translation, per patient request.\par \par In 2016 pt experienced a cough that was not resolving after its usual two-month course. She saw her doctor and was sent for a chest x-ray in October which was reportedly negative and in March/April 2017, she developed hoarseness. She saw her pulmonologist and was prescribed nebulizers. In 8/2017 she lost her voice, saw ENT and underwent a laryngoscopy revealing vocal cord paralysis. She was referred for CT scan of her chest that revealed a large TA mass with involvement of the recurrent laryngeal nerve causing the vocal cord paralysis. She underwent bronchoscopy with biopsy that was nondiagnostic. Underwent mediastinoscopy revealing squamous cell carcinoma. Completed concurrent chemo/RT with weekly Carbo/Taxol in mid-December 2017. Restaging CT Chest in late 1/2018 revealed NM. Started maintenance Durvalumab in 2/2018 and completed treatment x 1 year in 2/2019. Monitored off treatment since late 2/2019. Surveillance CT Chest in 9/2019 with TA paramediastinal and paraspinal opacity with interval progression since 1/2019 superimposed on RT changes which may represent tumor recurrence. Underwent bronch by Dr. Liang on 10/1 in which a 3-4cm left hilar mass was visualized. TA BAL revealed atypical findings; the left hilar biopsy was non-diagnostic. PET/CT performed in September was concerning for recurrence in the left hilar/paramediastinal region. Underwent CT-guided left lung biopsy on 10/23/19 that was positive for squamous cell carcinoma. Started first line Crizotinib for metastatic disease in 11/2019.  PET/CT performed in 1/2020 revealed [id number] findings. F/U Brain MRI in 2/2020 revealed no change from prior. Metastatic disease is not suspected. She discontinued Crizotinib in 4/2020 secondary to severe esophagitis despite dose-reduction. Started 2nd line Capmatinib in 5/2020; achieved NM. Dose increased in 1/2021 in attempt at increased disease control. Case was reviewed at tumor board and disease was felt to be overall stable from prior however. Restaging CT in late March appeared to show increased obstruction/atelectasis of the TA possibly related to increased secretions. She underwent Bronch on 5/5/21. Bronchoscopy revealed esophageal ulceration and tumor in the distal left main bronchus for which laser ablation was performed. Esophageal biopsies revealed ulcer and necrotic inflamed tissue. Left mainstem bronchus revealed her known poorly differentiated squamous cell carcinoma. Restaging PET/CT at that time with overall stable findings from prior radiologically, however given the bronchoscopic findings, systemic therapy changed to 3rd line Tepotinib (Tepmetko) in 5/2021 with disease progression in primary tumor evident on scan in 7/2021. Discontinued Tepmetko the day prior to start of 4th line Gemcitabine in 8/2021. \par \par Main issues for which patient presents today are anxiety, insomnia and loss of appetite. \par \par Interval Hx (10/11/21): Pt seen via telemedicine. \par Patient met with Oncology last week and made the decision to not pursue any further cancer treatments after her recent PET scan showed POD. Wishes to focus on management of symptoms.\par \par Has been using medical cannabis tincture in 1:1 THC:CBD which she uses during the day, high THC:low CBD which she uses at night.\par Daughter reports that pt has been sleeping much better with the nighttime cannabis. no AEs from the cannabis. Not sure if the 1:1 helping with appetite.\par \par ROS:\par +cough - improved lately; not using hycodan regularly\par +occasional SOB\par +lower abdominal cramping - usually relieved with pepto bismol. \par +dysphagia to solids, has to puree some foods in order to ingest them\par +fatigue\par +weakness\par +occasional oral ulcers - uses magic mouthwash PRN\par +checks BP daily, has had low BP readings (down to 60s/40s in August??) so she discontinued cardizem\par +mild nausea, feels it has gotten a bit better since stopping cancer treatment. \par +constipation - did not have a BM for about a week, had a loose BM this AM and feels better\par \par Patient is , lives with her  in a 2 bedroom apartment. She has 3 daughters who live nearby and help out. \par \par PMD: Dr. Jose Liang \par \par I-Stop Ref#:  [Home] : at home, [unfilled] , at the time of the visit. [Medical Office: ([facility])___] : at the medical office located in  [Other:____] : [unfilled] [Verbal consent obtained from patient] : the patient, [unfilled]

## 2021-11-05 NOTE — ED PROVIDER NOTE - OBJECTIVE STATEMENT
81 y/p F with PMH hypothyroidism, GERD, HTN,  asthma, afib w/ eliquis &  metoprolol + cardizem, lung cancer s/p chemo and radiation &y immunotherapy-completed 02/2019. currently getting Venofer/ Gemzar p/w worsening difficulty with eating. Pt accompanied by daughter translating as per pt wishes. pt states she has not been able to eat for several weeks. when she eats she feels like the food is going down wrong side. She reports several episodes of coughign when eating. She has been taking elquis crushed along with her other meds. She reports epigastric pain which is 6/10 chronically not improved with omeprazole 81 y/p F with PMH hypothyroidism, GERD, HTN,  asthma, afib w/ Eliquis &  metoprolol + Cardizem, lung cancer s/p chemo and radiation &y immunotherapy-completed 02/2019. currently getting Venofer/ Gemzar p/w worsening difficulty with eating. Pt accompanied by daughter translating as per pt wishes. pt states she has not been able to eat for several weeks. when she eats she feels like the food is going down wrong side. She reports several episodes of coughing when eating. She has been taking Eliquis crushed along with her other meds. She reports epigastric pain which is 6/10 chronically not improved with omeprazole. NKDA.

## 2021-11-05 NOTE — ED PROVIDER NOTE - ATTENDING CONTRIBUTION TO CARE
attending wrote note  Dr. Ashton: I have personally seen and examined this patient at the bedside. I have fully participated in the care of this patient. I have reviewed all pertinent clinical information, including history, physical exam, plan and the Resident's note and agree except as noted. HPI above as by me. PE above as by me. DDX PLAN

## 2021-11-05 NOTE — ED ADULT NURSE NOTE - OBJECTIVE STATEMENT
Received pt in bed A and Ox 3 in NAD resting comfortably, family at bedside pt c.o worsening ability to swallow, denies weakness on one side, sensory deficit, PERRLA extremities  are strong and equal, abd soft non distended non tender. pt placed on monitor with A fib noted RVR to 150's. Medicated as per order with HR now controlled at 105.

## 2021-11-05 NOTE — ED PROVIDER NOTE - NS ED ROS FT
denies fever, chills, chest pain, SOB, +abdominal pain, diarrhea, dysuria, syncope, bleeding, new rash,weakness, numbness, blurred vision  + vomiting  ROS  otherwise negative as per HPI denies fever, chills, chest pain, SOB, +abdominal pain, diarrhea, dysuria, syncope, bleeding, new rash, weakness, numbness, blurred vision  + vomiting  ROS  otherwise negative as per HPI

## 2021-11-05 NOTE — ED ADULT NURSE NOTE - CHIEF COMPLAINT QUOTE
c/o difficulty swallowing and eating with intermittent abdominal pain x 1 week. Pt's daughter states for past month pt has been having difficulty with swallowing. Food is going down wrong making her cough. Hx lung cancer. Last chemo session 1.5 months ago which failed. Not currently on any treatment for the cancer. Hx Afib on Eliquis. HR ranging 90s-140s in triage

## 2021-11-05 NOTE — ASSESSMENT
[FreeTextEntry1] : 81yoF with:\par \par 1. NSCLC - Off of disease modifying therapies.  \par \par 2. Loss of appetite /trouble sleeping - \par c/w medical cannabis regimen. \par \par 3. Cough - c/w PRN Hycodan syrup.\par \par 4. Encounter for Palliative Care - Emotional support provided.\par No HCP form on file, patient's daughter states her sister is primary HCP and she is secondary HCP.\par \par Hospice benefit discussed in detail, answered all questions to patient's apparent satisfaction.  They are not yet ready to sign up, will continue to follow patient regularly.\par \par Follow up in 1 month, call sooner with questions or issues. [______] : HCP: [unfilled]

## 2021-11-05 NOTE — ED PROVIDER NOTE - CLINICAL SUMMARY MEDICAL DECISION MAKING FREE TEXT BOX
81 y/p F with PMH hypothyroidism, GERD, HTN,  asthma, afib w/ eliquis &  metoprolol + cardizem, lung cancer s/p chemo and radiation &y immunotherapy-completed 02/2019. currently getting Venofer/ Gemzar p/w worsening difficulty with eating. pt w/ afib rvr failure to thrive do to disease progression and choking episdoes. cxr, cbc, cmp, vbg, ct abdomen, IVF, pain control, admission 81 y/p F with PMH hypothyroidism, GERD, HTN,  asthma, afib w/ Eliquis &  metoprolol + Cardizem, lung cancer s/p chemo and radiation &y immunotherapy-completed 02/2019. currently getting Venofer/ Gemzar p/w worsening difficulty with eating. pt w/ afib rvr failure to thrive do to disease progression and choking episodes. cxr, cbc, cmp, vbg, ct abdomen, IVF, pain control, admission

## 2021-11-05 NOTE — ED PROVIDER NOTE - PHYSICAL EXAMINATION
Gen: Awake, Alert, WD, WN, NAD  Head:  NC/AT frail   Eyes:  PERRL, EOMI, Conjunctiva pink, lids normal, no scleral icterus  ENT: OP clear, no exudates,  dry mucus membranes  Neck: supple, nontender, no meningismus, no JVD, trachea midline  Cardiac/CV:  S1 S2, irregular tachycardic  no M/G/R  Respiratory/Pulm:  CTAB, good air movement, normal resp effort, no wheezes/stridor/retractions/rales/rhonchi  Gastrointestinal/Abdomen:  Soft, tender epigastric , nondistended, +BS, no rebound/guarding  Back:  no CVAT, no MLT  Ext:  warm, well perfused, moving all extremities spontaneously, no peripheral edema, distal pulses intact  Skin: intact, no rash  Neuro:  AAOx3, sensation intact, motor 5/5 x 4 extremities,  speech clear

## 2021-11-05 NOTE — ED ADULT TRIAGE NOTE - CHIEF COMPLAINT QUOTE
c/o difficulty swallowing and eating with intermittent abdominal pain x 1 week. Pt's daughter states for past month pt has been having difficulty with swallowing. Food is going down wrong making her cough. Hx lung cancer. Last chemo session 1.5 months ago which failed. Not currently on any treatment for the cancer. c/o difficulty swallowing and eating with intermittent abdominal pain x 1 week. Pt's daughter states for past month pt has been having difficulty with swallowing. Food is going down wrong making her cough. Hx lung cancer. Last chemo session 1.5 months ago which failed. Not currently on any treatment for the cancer. Hx Afib on Eliquis. HR ranging 90s-140s in triage

## 2021-11-05 NOTE — ED PROVIDER NOTE - CARE PLAN
Principal Discharge DX:	Adult failure to thrive  Secondary Diagnosis:	Atrial fibrillation with RVR   1

## 2021-11-06 NOTE — PROGRESS NOTE ADULT - ASSESSMENT
81F Hx hypothyroidism, GERD, HTN,  asthma, afib w/ Eliquis &  metoprolol + Cardizem, lung cancer s/p chemo and radiation &y immunotherapy 02/2019 but currently getting Venofer/ Gemzar last dose 10/6 p/w worsening difficulty with eating xweeks admitted for Afib w/ rvr and FTT now DNR/DNI comfort care

## 2021-11-06 NOTE — H&P ADULT - PROBLEM SELECTOR PLAN 2
NSCLC on 4th line chemotherapy   - unable to really take much PO, soft foods and soup with abd pain and pressure   - Now DNR/DNI no pressors, comfort care, palliative and hospice consult   - first blm mouthwash and hicodine for cough

## 2021-11-06 NOTE — GOALS OF CARE CONVERSATION - ADVANCED CARE PLANNING - CONVERSATION DETAILS
Spoke with patient with  [id number], she noted that she understands that she is here after weeks of inability to take anything by mouth. She understands that her cancer is likely the cause of her inability to eat and generalized pain. At this point she says that there is nothing left to do but die but that she wants to die peacefully stating DNR DNI no pressors and referral to hospice program. Provider was unfortunately unable to contact daughter at this time to let her know of the situation but patient demonstrates full understanding of her situation and capacity to make decision at this time Spoke with patient with  [id number], she noted that she understands that she is here after weeks of inability to take anything by mouth. She understands that her cancer is likely the cause of her inability to eat and generalized pain. At this point she says that there is nothing left to do but die but that she wants to die peacefully stating DNR DNI no pressors and referral to hospice program. Provider was unfortunately unable to contact daughter at this time to let her know of the situation but patient demonstrates full understanding of her situation and has capacity to make decision at this time

## 2021-11-06 NOTE — H&P ADULT - NSHPLABSRESULTS_GEN_ALL_CORE
LABS:                        11.2   10.65 )-----------( 214      ( 05 Nov 2021 17:27 )             34.9     Hgb Trend: 11.2<--  11-05    142  |  106  |  8   ----------------------------<  105<H>  2.7<LL>   |  23  |  0.73    Ca    8.5      05 Nov 2021 17:27    TPro  6.2  /  Alb  2.9<L>  /  TBili  0.6  /  DBili  x   /  AST  20  /  ALT  21  /  AlkPhos  142<H>  11-05    Creatinine Trend: 0.73<--                    CAPILLARY BLOOD GLUCOSE LABS:                        11.2   10.65 )-----------( 214      ( 05 Nov 2021 17:27 )             34.9     Hgb Trend: 11.2<--  11-05    142  |  106  |  8   ----------------------------<  105<H>  2.7<LL>   |  23  |  0.73    Ca    8.5      05 Nov 2021 17:27    TPro  6.2  /  Alb  2.9<L>  /  TBili  0.6  /  DBili  x   /  AST  20  /  ALT  21  /  AlkPhos  142<H>  11-05    Creatinine Trend: 0.73<--    CAPILLARY BLOOD GLUCOSE

## 2021-11-06 NOTE — CONSULT NOTE ADULT - TIME BILLING
management of afib with RVR, complicated by bradyarrhythmia on combination of metoprolol with diltiazem

## 2021-11-06 NOTE — H&P ADULT - PROBLEM SELECTOR PLAN 1
Known Afib;   c/w 120mg diltiazem and metoprolol succinate 100mg   - EKG showed Afib QTc: 464 Known Afib; had afib w/ rvr to 140s   - c/w 120mg diltiazem and metoprolol succinate 100mg   - prn diltiazem and metoprolol IV   - EKG showed Afib QTc: 464 Known Afib; had afib w/ rvr to 140s   - c/w 120mg diltiazem and metoprolol succinate 100mg   - prn diltiazem and metoprolol IV for HR< 110 for palliative   - Given malignancy, general profile, though not currently hypoxic can consider PE but at this time given DNR DNI comfort, will not pursue further studies  - EKG showed Afib QTc: 464

## 2021-11-06 NOTE — H&P ADULT - ASSESSMENT
81F Hx hypothyroidism, GERD, HTN,  asthma, afib w/ Eliquis &  metoprolol + Cardizem, lung cancer s/p chemo and radiation &y immunotherapy 02/2019 but currently getting Venofer/ Gemzar last dose 10/6 p/w worsening difficulty with eating xweeks admitted for Afib w/ rvr and FTT  81F Hx hypothyroidism, GERD, HTN,  asthma, afib w/ Eliquis &  metoprolol + Cardizem, lung cancer s/p chemo and radiation &y immunotherapy 02/2019 but currently getting Venofer/ Gemzar last dose 10/6 p/w worsening difficulty with eating xweeks admitted for Afib w/ rvr and FTT now DNR/DNI comfort care

## 2021-11-06 NOTE — H&P ADULT - PROBLEM SELECTOR PLAN 5
SBP: 110s here   - on metoprolol and diltiazem,; will hold amlodipine i/s/o nearing hypotension SBP: 100s here   - on metoprolol and diltiazem,; will hold amlodipine i/s/o nearing hypotension

## 2021-11-06 NOTE — PROGRESS NOTE ADULT - PROBLEM SELECTOR PLAN 1
Known Afib; had afib w/ rvr to 140s   - c/w 120mg diltiazem and metoprolol succinate 100mg   - prn diltiazem and metoprolol IV for HR< 110 for palliative   - Given malignancy, general profile, though not currently hypoxic can consider PE but at this time given DNR DNI comfort, will not pursue further studies  - EKG showed Afib QTc: 464  Telemetry was reviewed patient goes from 150- 43 ventricular rate , concern of tachy-vu, SSS, will call EP   Consider holding BB, TTE

## 2021-11-06 NOTE — PROGRESS NOTE ADULT - SUBJECTIVE AND OBJECTIVE BOX
[facility] Division of Hospital Medicine  Lore Cortez MD  Pager (M-F, 8A-5P): [phone]      Patient is a 81y old  Female who presents with a chief complaint of Afib with RVR (06 Nov 2021 15:55)      SUBJECTIVE / OVERNIGHT EVENTS: no complaints   ADDITIONAL REVIEW OF SYSTEMS: telemetry reviewed A lena - darlyn womack , patient signed MOLST form , wants hospice care , EP consult     MEDICATIONS  (STANDING):  apixaban 2.5 milliGRAM(s) Oral every 12 hours  artificial  tears Solution 1 Drop(s) Both EYES four times a day  buDESOnide    Inhalation Suspension 0.5 milliGRAM(s) Inhalation daily  influenza  Vaccine (HIGH DOSE) 0.7 milliLiter(s) IntraMuscular once  levothyroxine 75 MICROGram(s) Oral daily  metoprolol succinate  milliGRAM(s) Oral at bedtime  metoprolol tartrate Injectable 5 milliGRAM(s) IV Push Once  ondansetron Injectable 4 milliGRAM(s) IV Push once  pantoprazole  Injectable 40 milliGRAM(s) IV Push daily    MEDICATIONS  (PRN):  acetaminophen     Tablet .. 650 milliGRAM(s) Oral every 6 hours PRN Mild Pain (1 - 3)  FIRST- Mouthwash  BLM 5 milliLiter(s) Swish and Spit every 6 hours PRN Mouth Care  hydrocodone/homatropine Syrup 5 milliLiter(s) Oral every 6 hours PRN Cough  oxyCODONE    IR 5 milliGRAM(s) Oral every 6 hours PRN Moderate Pain (4 - 6)      CAPILLARY BLOOD GLUCOSE        I&O's Summary    06 Nov 2021 08:01  -  06 Nov 2021 17:18  --------------------------------------------------------  IN: 540 mL / OUT: 2 mL / NET: 538 mL        PHYSICAL EXAM:  Vital Signs Last 24 Hrs  T(C): 36.6 (06 Nov 2021 11:47), Max: 36.8 (05 Nov 2021 21:30)  T(F): 97.9 (06 Nov 2021 11:47), Max: 98.3 (05 Nov 2021 21:30)  HR: 79 (06 Nov 2021 11:47) (75 - 147)  BP: 134/76 (06 Nov 2021 11:47) (105/81 - 140/71)  BP(mean): --  RR: 18 (06 Nov 2021 11:47) (14 - 19)  SpO2: 98% (06 Nov 2021 11:47) (97% - 100%)  CONSTITUTIONAL: NAD, well-developed, well-groomed  EYES: EOMI; conjunctiva and sclera clear  NECK: Supple, no palpable masses; no thyromegaly  RESPIRATORY: Normal respiratory effort; lungsare decreased b/l to auscultation bilaterally  CARDIOVASCULAR: Regular rate and rhythm, normal S1 and S2,; No lower extremity edema;   ABDOMEN: Nontender to palpation, normoactive bowel sounds, no rebound/guarding; No hepatosplenomegaly  MUSKULOSKELETAL:  no clubbing or cyanosis of digits; no joint swelling or tenderness to palpation  PSYCH: A+O to person, place, and time; affect appropriate  NEUROLOGY: CN 2-12 are intact and symmetric; no gross sensory deficits;   SKIN: No rashes; no palpable lesions    LABS:                        11.2   10.65 )-----------( 214      ( 05 Nov 2021 17:27 )             34.9     11-06    143  |  108<H>  |  9   ----------------------------<  74  3.2<L>   |  19<L>  |  0.69    Ca    8.0<L>      06 Nov 2021 08:21  Phos  2.4     11-06  Mg     2.40     11-06    TPro  6.2  /  Alb  2.9<L>  /  TBili  0.6  /  DBili  x   /  AST  20  /  ALT  21  /  AlkPhos  142<H>  11-05                RADIOLOGY & ADDITIONAL TESTS:  Results Reviewed.  Imaging Personally Reviewed.    COORDINATION OF CARE:  Care Discussed with ACP

## 2021-11-06 NOTE — CONSULT NOTE ADULT - ASSESSMENT
82 yo Armenian-speaking F with a PMH of hypothyroidism, GERD, HTN,  asthma, afib w/Eliquis, and squamous cell carcinoma of the lung (dx 2017, currently off chemo) with vocal cord paralysis who p/w worsening difficulty with eating x several weeks, a/w failure to thrive.    #Failure to Thrive/Squamous Cell Carcinoma of the Lung  - Originally stage III  - On multiple lines of chemotherapy since 2017, last given Gemcitabine, stopped in early October  - PET/CT 10/4/21 showed increased size/extent and FDG avidity of large difficult to delineate soft tissue left upper lobe/paramediastinal region extending into the perihilar and subcarinal region, concerning for progression of disease  - Patient and family decline further therapy  - Hospice has been discussed outpatient, follows with Dr. Rainey, but patient is not ready for it at this time. I discussed Hospice again with her. She was initially worried that she would not be able to be home, but I noted the option of home hospice. She states she would still like to discuss this with her family.  - Also spoke with patient's HCP, daughter Dr. Ivanna Olivares. She expressed interest in a PEG tube to alleviate some of the suffering for her mother from choking and allowing her for comfort. She noted that it would not be for prolonging life but for comfort. Dr. Olivares stated she spoke with the patient, who was in agreement in interest for the tube. I noted that she would still have a risk of aspiration from saliva even with the PEG tube, and although it was something to consider, it still had its own risks, such as infection, puncture. Please obtain palliative consult to assist with guidance  - Supportive therapy: pain and anti-nausea control  - Soft diet for now  - PT eval  - No further chemotherapy  - Patient is DNR/DNI    Aryles Hedjar, MD, PGY-4  Hematology/Oncology Fellow  [facility]  Pager: [phone]  After 5PM and on weekends and holidays, please call the inpatient fellow on call.  82 yo Nepali-speaking F with a PMH of hypothyroidism, GERD, HTN,  asthma, afib w/Eliquis, and squamous cell carcinoma of the lung (dx 2017, currently off chemo) with vocal cord paralysis who p/w worsening difficulty with eating x several weeks, a/w failure to thrive.    #Failure to Thrive/Squamous Cell Carcinoma of the Lung  - Originally stage III  - On multiple lines of chemotherapy since 2017, last given Gemcitabine, stopped in early October  - PET/CT 10/4/21 showed increased size/extent and FDG avidity of large difficult to delineate soft tissue left upper lobe/paramediastinal region extending into the perihilar and subcarinal region, concerning for progression of disease  - Patient and family decline further therapy  - Hospice has been discussed outpatient, follows with Dr. Rainey, but patient is not ready for it at this time. I discussed Hospice again with her. She was initially worried that she would not be able to be home, but I noted the option of home hospice. She states she would still like to discuss this with her family.  - Also spoke with patient's HCP, daughter Dr. Ivanna Olivares. She expressed interest in a PEG tube to alleviate some of the suffering for her mother from choking and allowing her for comfort. She noted that it would not be for prolonging life but for comfort. Dr. Olivares stated she spoke with the patient, who was in agreement in interest for the tube. I noted that she would still have a risk of aspiration from saliva even with the PEG tube, and although it was something to consider, it still had its own risks, such as infection, puncture. Please obtain palliative consult to assist with guidance  - Supportive therapy: pain and anti-nausea control  - Soft diet for now  - PT eval  - Patient is DNR/DNI    Aryles Hedjar, MD, PGY-4  Hematology/Oncology Fellow  [facility]  Pager: [phone]  After 5PM and on weekends and holidays, please call the inpatient fellow on call.  80 yo Yoruba-speaking F with a PMH of hypothyroidism, GERD, HTN,  asthma, afib w/Eliquis, and squamous cell carcinoma of the lung (dx 2017, currently off chemo) with vocal cord paralysis who p/w worsening difficulty with eating x several weeks, a/w failure to thrive.    #Failure to Thrive/Squamous Cell Carcinoma of the Lung  - Originally stage III, with vocal cord paralysis due to recurrent laryngeal nerve involvement  - On multiple lines of chemotherapy since 2017, last given Gemcitabine, stopped in early October  - PET/CT 10/4/21 showed increased size/extent and FDG avidity of large difficult to delineate soft tissue left upper lobe/paramediastinal region extending into the perihilar and subcarinal region, concerning for progression of disease  - Patient and family decline further therapy  - Hospice has been discussed outpatient, follows with Dr. Rainey, but patient is not ready for it at this time. I discussed Hospice again with her. She was initially worried that she would not be able to be home, but I noted the option of home hospice. She states she would still like to discuss this with her family.  - Also spoke with patient's HCP, daughter Dr. Ivanna Olivares. She expressed interest in a PEG tube to alleviate some of the suffering for her mother from choking and allowing her for comfort. She noted that it would not be for prolonging life but for comfort. Dr. Olivares stated she spoke with the patient, who was in agreement in interest for the tube. I noted that she would still have a risk of aspiration from saliva even with the PEG tube, and although it was something to consider, it still had its own risks, such as infection, puncture. Please obtain palliative consult to assist with guidance  - Supportive therapy: pain and anti-nausea control  - Soft diet for now  - PT eval  - Patient is DNR/DNI    Aryles Hedjar, MD, PGY-4  Hematology/Oncology Fellow  [facility]  Pager: [phone]  After 5PM and on weekends and holidays, please call the inpatient fellow on call.

## 2021-11-06 NOTE — CONSULT NOTE ADULT - SUBJECTIVE AND OBJECTIVE BOX
Portuguese  (ID #[id number])  80 yo Portuguese-speaking F with a PMH of hypothyroidism, GERD, HTN,  asthma, afib w/ Eliquis, metoprolol, Cardizem, NSCLC (2017) now on 4th line chemo: Venofer/ Gemzar (10/6) who p/w worsening difficulty with eating x several weeks. She had has been unable to really eat much. She had abd pain and pressing chest pain at least since her last chemo. +N/V/productive cough. No fevers. At this time when asked about her understanding re: her cancer, she says that "there is nothing left to do but to die" and that she would like to go peacefully.   (06 Nov 2021 00:59)      Allergies    No Known Allergies      MEDICATIONS  (STANDING):  apixaban 2.5 milliGRAM(s) Oral every 12 hours  artificial  tears Solution 1 Drop(s) Both EYES four times a day  buDESOnide    Inhalation Suspension 0.5 milliGRAM(s) Inhalation daily  influenza  Vaccine (HIGH DOSE) 0.7 milliLiter(s) IntraMuscular once  levothyroxine 75 MICROGram(s) Oral daily  metoprolol succinate  milliGRAM(s) Oral at bedtime  ondansetron Injectable 4 milliGRAM(s) IV Push once  pantoprazole  Injectable 40 milliGRAM(s) IV Push daily    MEDICATIONS  (PRN):  acetaminophen     Tablet .. 650 milliGRAM(s) Oral every 6 hours PRN Mild Pain (1 - 3)  FIRST- Mouthwash  BLM 5 milliLiter(s) Swish and Spit every 6 hours PRN Mouth Care  hydrocodone/homatropine Syrup 5 milliLiter(s) Oral every 6 hours PRN Cough  oxyCODONE    IR 5 milliGRAM(s) Oral every 6 hours PRN Moderate Pain (4 - 6)      PAST MEDICAL & SURGICAL HISTORY:  Hypertension    Asthma    GERD (gastroesophageal reflux disease)    Hypothyroidism    Other nonspecific abnormal finding of lung field    Lung cancer  left upper lobe - received CHEMO RT and immunotherapy    Paralysis of vocal cords    Atrial fibrillation  dx.  3/29/21, to start anticoagulation post procedure per cardiology per pt    History of lung biopsy  2017    H/O colonoscopy    H/O vocal cord paralysis  filter injection 2018    History of laryngoscopy  thyroplasty with manning implant pharyngoplasty        FAMILY HISTORY:  Family history of cancer (Sibling) - colon cancer (brother)        SOCIAL HISTORY: No EtOH, no tobacco    REVIEW OF SYSTEMS:  CONSTITUTIONAL: no fever  EYES/ENT: No visual changes;  no throat pain   NECK: No pain or stiffness  RESPIRATORY: no sob  CARDIOVASCULAR: No chest pain or palpitations  GASTROINTESTINAL: No abdominal pain. No N/V/D/C  GENITOURINARY: No dysuria, change in frequency or hematuria  NEUROLOGICAL: No numbness or weakness  SKIN: No itching, burning, rashes, or lesions   Psych: No depression   MSK: no joint pain  Allergy: no urticaria       Weight (kg): 53.4 (11-05 @ 23:42)    T(F): 97.9 (11-06-21 @ 11:47), Max: 98.3 (11-05-21 @ 21:30)  HR: 79 (11-06-21 @ 11:47)  BP: 134/76 (11-06-21 @ 11:47)  RR: 18 (11-06-21 @ 11:47)  SpO2: 98% (11-06-21 @ 11:47)  Wt(kg): --    GENERAL: NAD  HEENT: EOMI, MMM, no oropharyngeal lesions or erythema appreciated  Pulm: no increased WOB, CTAB/L  CV: RRR, S1, S2, no m/g/r  ABDOMEN: soft, NT, ND, no masses felt, no HSM  MSK: nl ROM  EXTREMITIES:  no appreciable edema in b/l LE  Neuro: A&Ox3, no focal deficits  SKIN: warm and dry, no visible rash                          11.2   10.65 )-----------( 214      ( 05 Nov 2021 17:27 )             34.9       11-06    143  |  108<H>  |  9   ----------------------------<  74  3.2<L>   |  19<L>  |  0.69    Ca    8.0<L>      06 Nov 2021 08:21  Phos  2.4     11-06  Mg     2.40     11-06    TPro  6.2  /  Alb  2.9<L>  /  TBili  0.6  /  DBili  x   /  AST  20  /  ALT  21  /  AlkPhos  142<H>  11-05      Phosphorus Level, Serum: 2.4 mg/dL (11-06 @ 08:21)  Magnesium, Serum: 2.40 mg/dL (11-06 @ 08:21)       Urdu  (ID #[id number])  80 yo Urdu-speaking F with a PMH of hypothyroidism, GERD, HTN,  asthma, afib w/Eliquis, NSCLC (2017) now on 4th line chemo: Venofer/ Gemzar (10/6) who p/w worsening difficulty with eating x several weeks. She had has been unable to really eat much, eating solid foods slightly better than liquids. She denies pain with swallowing. She does choke a lot and also endorses some nausea/vomiting since her last dose of chemo one month ago. She had abd pain and pressing chest pain at least since her last chemo. +N/V/productive cough. No fevers. At this time when asked about her understanding re: her cancer, she says that "there is nothing left to do but to die" and that she would like to go peacefully.   (06 Nov 2021 00:59)      Allergies    No Known Allergies      MEDICATIONS  (STANDING):  apixaban 2.5 milliGRAM(s) Oral every 12 hours  artificial  tears Solution 1 Drop(s) Both EYES four times a day  buDESOnide    Inhalation Suspension 0.5 milliGRAM(s) Inhalation daily  influenza  Vaccine (HIGH DOSE) 0.7 milliLiter(s) IntraMuscular once  levothyroxine 75 MICROGram(s) Oral daily  metoprolol succinate  milliGRAM(s) Oral at bedtime  ondansetron Injectable 4 milliGRAM(s) IV Push once  pantoprazole  Injectable 40 milliGRAM(s) IV Push daily    MEDICATIONS  (PRN):  acetaminophen     Tablet .. 650 milliGRAM(s) Oral every 6 hours PRN Mild Pain (1 - 3)  FIRST- Mouthwash  BLM 5 milliLiter(s) Swish and Spit every 6 hours PRN Mouth Care  hydrocodone/homatropine Syrup 5 milliLiter(s) Oral every 6 hours PRN Cough  oxyCODONE    IR 5 milliGRAM(s) Oral every 6 hours PRN Moderate Pain (4 - 6)      PAST MEDICAL & SURGICAL HISTORY:  Hypertension    Asthma    GERD (gastroesophageal reflux disease)    Hypothyroidism    Other nonspecific abnormal finding of lung field    Lung cancer  left upper lobe - received CHEMO RT and immunotherapy    Paralysis of vocal cords    Atrial fibrillation  dx.  3/29/21, to start anticoagulation post procedure per cardiology per pt    History of lung biopsy  2017    H/O colonoscopy    H/O vocal cord paralysis  filter injection 2018    History of laryngoscopy  thyroplasty with manning implant pharyngoplasty        FAMILY HISTORY:  Family history of cancer (Sibling) - colon cancer (brother)        SOCIAL HISTORY: No EtOH, no tobacco    REVIEW OF SYSTEMS:  CONSTITUTIONAL: no fever  EYES/ENT: No visual changes;  no throat pain   NECK: No pain or stiffness  RESPIRATORY: no sob  CARDIOVASCULAR: No chest pain or palpitations  GASTROINTESTINAL: No abdominal pain. No N/V/D/C  GENITOURINARY: No dysuria, change in frequency or hematuria  NEUROLOGICAL: No numbness or weakness  SKIN: No itching, burning, rashes, or lesions   Psych: No depression   MSK: no joint pain  Allergy: no urticaria       Weight (kg): 53.4 (11-05 @ 23:42)    T(F): 97.9 (11-06-21 @ 11:47), Max: 98.3 (11-05-21 @ 21:30)  HR: 79 (11-06-21 @ 11:47)  BP: 134/76 (11-06-21 @ 11:47)  RR: 18 (11-06-21 @ 11:47)  SpO2: 98% (11-06-21 @ 11:47)  Wt(kg): --    GENERAL: NAD  HEENT: EOMI, MMM, no oropharyngeal lesions or erythema appreciated  Pulm: no increased WOB, CTAB/L  CV: RRR, S1, S2, no m/g/r  ABDOMEN: soft, NT, ND, no masses felt, no HSM  MSK: nl ROM  EXTREMITIES:  no appreciable edema in b/l LE  Neuro: A&Ox3, no focal deficits  SKIN: warm and dry, no visible rash                          11.2   10.65 )-----------( 214      ( 05 Nov 2021 17:27 )             34.9       11-06    143  |  108<H>  |  9   ----------------------------<  74  3.2<L>   |  19<L>  |  0.69    Ca    8.0<L>      06 Nov 2021 08:21  Phos  2.4     11-06  Mg     2.40     11-06    TPro  6.2  /  Alb  2.9<L>  /  TBili  0.6  /  DBili  x   /  AST  20  /  ALT  21  /  AlkPhos  142<H>  11-05      Phosphorus Level, Serum: 2.4 mg/dL (11-06 @ 08:21)  Magnesium, Serum: 2.40 mg/dL (11-06 @ 08:21)       French  (ID #[id number])  82 yo French-speaking F with a PMH of hypothyroidism, GERD, HTN,  asthma, afib w/Eliquis, NSCLC (2017) now on 4th line chemo: Venofer/ Gemzar (10/6) who p/w worsening difficulty with eating x several weeks. She had has been unable to really eat much, eating solid foods slightly better than liquids. She denies pain with swallowing. She does choke a lot and also endorses some nausea/vomiting since her last dose of chemo one month ago. She has some achy upper abdominal pain that is improved with Pepcid. She also has a mildly productive cough.  She says she is not interested in chemotherapy. 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 In the summer of 2016 she experienced a cough that was not resolving after its usual two-month course. She saw her doctor and was sent for a chest x-ray in October which was reportedly negative area and in March/April 2017, she developed hoarseness. She saw her pulmonologist and was prescribed nebulizers. In August 2017 she lost her voice and saw in ENT doctor and underwent a laryngoscopy reveals vocal cord paralysis. She was referred for CT scan of her chest that revealed a large left upper lobe mass with involvement of the recurrent laryngeal nerve causing the vocal cord paralysis. She underwent bronchoscopy with biopsy that was nondiagnostic. Underwent mediastinoscopy revealing squamous cell carcinoma. Completed concurrent chemo/RT with weekly Carbo/Taxol in mid-December 2017. Restaging CT Chest in late January 2018 revealed AR. Started maintenance Durvalumab in late Feb 2018 and completed treatment x 1 year in late February 2019. Monitored off treatment since late Feb 2019. Surveillance CT Chest in Sept 2019 with TA paramediastinal and paraspinal opacity with interval progression since January 2019 superimposed on radiation therapy changes which may represent tumor recurrence. Patient referred back to Dr. Liang. Underwent bronch on 10/1 in which a 3-4cm left hilar mass was visualized. TA BAL revealed atypical findings; the left hilar biopsy was non-diagnostic. PET/CT performed in September was concerning for recurrence in the left hilar/paramediastinal region. Underwent CT-guided left lung biopsy on 10/23/19 that was positive for squamous cell carcinoma. Started first line Crizotinib for metastatic disease in November 2019. Restaging CT Chest in late December 2019 reported as progression of left roxana-hilar mass, however, given post-RT changes, the findings can only be evaluated on PET/CT per Radiologist. PET/CT performed in early January revealed [id number] findings. F/U Brain MRI in early Feb 2020 revealed no change from prior; in d/w Radiologist, the right frontal subcortical region finding is still present and is suspected to be secondary to a vascular/vessel finding. Metastatic disease is not suspected. She discontinued Crizotinib in late April 2020 secondary to severe esophagitis despite dose-reduction. Started 2nd line Capmatinib in late May 2020; achieved AR. Dose increased in Jan 2021 in attempt at increased disease control. Case was reviewed at tumor board and disease was felt to be overall stable from prior however. Restaging CT in late March appeared to show increased obstruction/atelectasis of the TA possibly related to increased secretions. She was referred back to Cleve Liang and underwent Bronch on 5/5/21. Bronchoscopy revealed esophageal ulceration and tumor in the distal left main bronchus for which laser ablation was performed. Esophageal biopsies revealed ulcer and necrotic inflamed tissue. Left mainstem bronchus revealed her known poorly differentiated squamous cell carcinoma. Restaging PET/CT at that time with overall stable findings from prior radiologically, however given the bronchoscopic findings, systemic therapy changed to 3rd line Tepotinib (Tepmetko) in late May 2021 with disease progression in primary tumor evident on scan in July 2021. Discontinued Tepmetko the day prior to start of 4th line Gemcitabine in early August 2021 with disease progression after 3 cycles.     Allergies    No Known Allergies      MEDICATIONS  (STANDING):  apixaban 2.5 milliGRAM(s) Oral every 12 hours  artificial  tears Solution 1 Drop(s) Both EYES four times a day  buDESOnide    Inhalation Suspension 0.5 milliGRAM(s) Inhalation daily  influenza  Vaccine (HIGH DOSE) 0.7 milliLiter(s) IntraMuscular once  levothyroxine 75 MICROGram(s) Oral daily  metoprolol succinate  milliGRAM(s) Oral at bedtime  ondansetron Injectable 4 milliGRAM(s) IV Push once  pantoprazole  Injectable 40 milliGRAM(s) IV Push daily    MEDICATIONS  (PRN):  acetaminophen     Tablet .. 650 milliGRAM(s) Oral every 6 hours PRN Mild Pain (1 - 3)  FIRST- Mouthwash  BLM 5 milliLiter(s) Swish and Spit every 6 hours PRN Mouth Care  hydrocodone/homatropine Syrup 5 milliLiter(s) Oral every 6 hours PRN Cough  oxyCODONE    IR 5 milliGRAM(s) Oral every 6 hours PRN Moderate Pain (4 - 6)      PAST MEDICAL & SURGICAL HISTORY:  Hypertension    Asthma    GERD (gastroesophageal reflux disease)    Hypothyroidism    Other nonspecific abnormal finding of lung field    Lung cancer  left upper lobe - received CHEMO RT and immunotherapy    Paralysis of vocal cords    Atrial fibrillation  dx.  3/29/21, to start anticoagulation post procedure per cardiology per pt    History of lung biopsy  2017    H/O colonoscopy    H/O vocal cord paralysis  filter injection 2018    History of laryngoscopy  thyroplasty with manning implant pharyngoplasty        FAMILY HISTORY:  Family history of cancer (Sibling) - colon cancer (brother)        SOCIAL HISTORY: No EtOH, no tobacco    REVIEW OF SYSTEMS:  CONSTITUTIONAL: no fever  EYES/ENT: No visual changes;  no throat pain   NECK: No pain or stiffness  RESPIRATORY: no sob  CARDIOVASCULAR: No chest pain or palpitations  GASTROINTESTINAL: No abdominal pain. No N/V/D/C  GENITOURINARY: No dysuria, change in frequency or hematuria  NEUROLOGICAL: No numbness or weakness  SKIN: No itching, burning, rashes, or lesions   Psych: No depression   MSK: no joint pain  Allergy: no urticaria       Weight (kg): 53.4 (11-05 @ 23:42)    T(F): 97.9 (11-06-21 @ 11:47), Max: 98.3 (11-05-21 @ 21:30)  HR: 79 (11-06-21 @ 11:47)  BP: 134/76 (11-06-21 @ 11:47)  RR: 18 (11-06-21 @ 11:47)  SpO2: 98% (11-06-21 @ 11:47)  Wt(kg): --    GENERAL: NAD  HEENT: EOMI, MMM, no oropharyngeal lesions or erythema appreciated  Pulm: no increased WOB, CTAB/L  CV: RRR, S1, S2, no m/g/r  ABDOMEN: soft, NT, ND, no masses felt, no HSM  MSK: nl ROM  EXTREMITIES:  no appreciable edema in b/l LE  Neuro: A&Ox3, no focal deficits  SKIN: warm and dry, no visible rash                          11.2   10.65 )-----------( 214      ( 05 Nov 2021 17:27 )             34.9       11-06    143  |  108<H>  |  9   ----------------------------<  74  3.2<L>   |  19<L>  |  0.69    Ca    8.0<L>      06 Nov 2021 08:21  Phos  2.4     11-06  Mg     2.40     11-06    TPro  6.2  /  Alb  2.9<L>  /  TBili  0.6  /  DBili  x   /  AST  20  /  ALT  21  /  AlkPhos  142<H>  11-05      Phosphorus Level, Serum: 2.4 mg/dL (11-06 @ 08:21)  Magnesium, Serum: 2.40 mg/dL (11-06 @ 08:21)       Croatian  (ID #[id number])  80 yo Croatian-speaking F with a PMH of hypothyroidism, GERD, HTN,  asthma, afib w/Eliquis, and squamous cell carcinoma of the lung (dx 2017, currently off chemo) with vocal cord paralysis who p/w worsening difficulty with eating x several weeks. She had has been unable to really eat much, eating solid foods slightly better than liquids. She denies pain with swallowing. She does choke a lot and also endorses some nausea/vomiting since her last dose of chemo one month ago. She has some achy upper abdominal pain that is improved with Pepcid. She also has a mildly productive cough.  She says she is not interested in chemotherapy. She     In the summer of 2016 she experienced a cough that was not resolving after its usual two-month course. She saw her doctor and was sent for a chest x-ray in October which was reportedly negative area and in March/April 2017, she developed hoarseness. She saw her pulmonologist and was prescribed nebulizers. In August 2017 she lost her voice and saw in ENT doctor and underwent a laryngoscopy reveals vocal cord paralysis. She was referred for CT scan of her chest that revealed a large left upper lobe mass with involvement of the recurrent laryngeal nerve causing the vocal cord paralysis. She underwent bronchoscopy with biopsy that was nondiagnostic. Underwent mediastinoscopy revealing squamous cell carcinoma. Completed concurrent chemo/RT with weekly Carbo/Taxol in mid-December 2017. Restaging CT Chest in late January 2018 revealed ND. Started maintenance Durvalumab in late Feb 2018 and completed treatment x 1 year in late February 2019. Monitored off treatment since late Feb 2019. Surveillance CT Chest in Sept 2019 with TA paramediastinal and paraspinal opacity with interval progression since January 2019 superimposed on radiation therapy changes which may represent tumor recurrence. Patient referred back to Dr. Liang. Underwent bronch on 10/1 in which a 3-4cm left hilar mass was visualized. TA BAL revealed atypical findings; the left hilar biopsy was non-diagnostic. PET/CT performed in September was concerning for recurrence in the left hilar/paramediastinal region. Underwent CT-guided left lung biopsy on 10/23/19 that was positive for squamous cell carcinoma. Started first line Crizotinib for metastatic disease in November 2019. Restaging CT Chest in late December 2019 reported as progression of left roxana-hilar mass, however, given post-RT changes, the findings can only be evaluated on PET/CT per Radiologist. PET/CT performed in early January revealed [id number] findings. F/U Brain MRI in early Feb 2020 revealed no change from prior; in d/w Radiologist, the right frontal subcortical region finding is still present and is suspected to be secondary to a vascular/vessel finding. Metastatic disease is not suspected. She discontinued Crizotinib in late April 2020 secondary to severe esophagitis despite dose-reduction. Started 2nd line Capmatinib in late May 2020; achieved ND. Dose increased in Jan 2021 in attempt at increased disease control. Case was reviewed at tumor board and disease was felt to be overall stable from prior however. Restaging CT in late March appeared to show increased obstruction/atelectasis of the TA possibly related to increased secretions. She was referred back to Cleve Liang and underwent Bronch on 5/5/21. Bronchoscopy revealed esophageal ulceration and tumor in the distal left main bronchus for which laser ablation was performed. Esophageal biopsies revealed ulcer and necrotic inflamed tissue. Left mainstem bronchus revealed her known poorly differentiated squamous cell carcinoma. Restaging PET/CT at that time with overall stable findings from prior radiologically, however given the bronchoscopic findings, systemic therapy changed to 3rd line Tepotinib (Tepmetko) in late May 2021 with disease progression in primary tumor evident on scan in July 2021. Discontinued Tepmetko the day prior to start of 4th line Gemcitabine in early August 2021 with disease progression after 3 cycles and her chemo was stopped in early October 2021 (1 month ago).  Her last PET/CT from 10/4/21 (compared to 7/26/21) showed:  1. Increased size/extent and FDG avidity of large difficult to delineate soft tissue left upper lobe/paramediastinal region extending into the perihilar and subcarinal region, concerning for progression of disease.  2. New or increased FDG avid left pleural thickening.  3. New FDG avid intramuscular focus right upper arm, indeterminate.  4. Unchanged diffuse thyroid and perianal FDG avidity.    Allergies  No Known Allergies      MEDICATIONS  (STANDING):  apixaban 2.5 milliGRAM(s) Oral every 12 hours  artificial  tears Solution 1 Drop(s) Both EYES four times a day  buDESOnide    Inhalation Suspension 0.5 milliGRAM(s) Inhalation daily  influenza  Vaccine (HIGH DOSE) 0.7 milliLiter(s) IntraMuscular once  levothyroxine 75 MICROGram(s) Oral daily  metoprolol succinate  milliGRAM(s) Oral at bedtime  ondansetron Injectable 4 milliGRAM(s) IV Push once  pantoprazole  Injectable 40 milliGRAM(s) IV Push daily    MEDICATIONS  (PRN):  acetaminophen     Tablet .. 650 milliGRAM(s) Oral every 6 hours PRN Mild Pain (1 - 3)  FIRST- Mouthwash  BLM 5 milliLiter(s) Swish and Spit every 6 hours PRN Mouth Care  hydrocodone/homatropine Syrup 5 milliLiter(s) Oral every 6 hours PRN Cough  oxyCODONE    IR 5 milliGRAM(s) Oral every 6 hours PRN Moderate Pain (4 - 6)      PAST MEDICAL & SURGICAL HISTORY:  Hypertension    Asthma    GERD (gastroesophageal reflux disease)    Hypothyroidism    Other nonspecific abnormal finding of lung field    Lung cancer  left upper lobe - received CHEMO RT and immunotherapy    Paralysis of vocal cords    Atrial fibrillation  dx.  3/29/21, to start anticoagulation post procedure per cardiology per pt    History of lung biopsy  2017    H/O colonoscopy    H/O vocal cord paralysis  filter injection 2018    History of laryngoscopy  thyroplasty with manning implant pharyngoplasty        FAMILY HISTORY:  Family history of cancer (Sibling) - colon cancer (brother)        SOCIAL HISTORY: No EtOH, no tobacco    REVIEW OF SYSTEMS:  CONSTITUTIONAL: + weight loss. No fever  EYES/ENT: No visual changes;  no throat pain   NECK: No pain or stiffness  RESPIRATORY: no sob  CARDIOVASCULAR: No chest pain or palpitations  GASTROINTESTINAL: No abdominal pain. No N/V/D/C  GENITOURINARY: No dysuria, change in frequency or hematuria  NEUROLOGICAL: No numbness or weakness  SKIN: No itching, burning, rashes, or lesions   Psych: No depression   MSK: no joint pain  Allergy: no urticaria       Weight (kg): 53.4 (11-05 @ 23:42)    T(F): 97.9 (11-06-21 @ 11:47), Max: 98.3 (11-05-21 @ 21:30)  HR: 79 (11-06-21 @ 11:47)  BP: 134/76 (11-06-21 @ 11:47)  RR: 18 (11-06-21 @ 11:47)  SpO2: 98% (11-06-21 @ 11:47)  Wt(kg): --    GENERAL: NAD  HEENT: EOMI, MMM, no oropharyngeal lesions or erythema appreciated  Pulm: no increased WOB, CTAB/L  CV: RRR, S1, S2, no m/g/r  ABDOMEN: soft, NT, ND, no masses felt, no HSM  MSK: nl ROM  EXTREMITIES:  no appreciable edema in b/l LE  Neuro: A&Ox3, no focal deficits  SKIN: warm and dry, no visible rash                          11.2   10.65 )-----------( 214      ( 05 Nov 2021 17:27 )             34.9       11-06    143  |  108<H>  |  9   ----------------------------<  74  3.2<L>   |  19<L>  |  0.69    Ca    8.0<L>      06 Nov 2021 08:21  Phos  2.4     11-06  Mg     2.40     11-06    TPro  6.2  /  Alb  2.9<L>  /  TBili  0.6  /  DBili  x   /  AST  20  /  ALT  21  /  AlkPhos  142<H>  11-05      Phosphorus Level, Serum: 2.4 mg/dL (11-06 @ 08:21)  Magnesium, Serum: 2.40 mg/dL (11-06 @ 08:21)       Polish  (ID #[id number])  80 yo Polish-speaking F with a PMH of hypothyroidism, GERD, HTN,  asthma, afib w/Eliquis, and squamous cell carcinoma of the lung (dx 2017, currently off chemo) with vocal cord paralysis who p/w worsening difficulty with eating x several weeks. She had has been unable to really eat much, eating solid foods slightly better than liquids. She denies pain with swallowing. She does choke a lot and also endorses some nausea/vomiting since her last dose of chemo one month ago. She has some achy upper abdominal pain that is improved with Pepcid. She also has a mildly productive cough.  She says she is not interested in further chemotherapy.    In the summer of 2016 she experienced a cough that was not resolving after its usual two-month course. She saw her doctor and was sent for a chest x-ray in October which was reportedly negative area and in March/April 2017, she developed hoarseness. She saw her pulmonologist and was prescribed nebulizers. In August 2017 she lost her voice and saw in ENT doctor and underwent a laryngoscopy reveals vocal cord paralysis. She was referred for CT scan of her chest that revealed a large left upper lobe mass with involvement of the recurrent laryngeal nerve causing the vocal cord paralysis. She underwent bronchoscopy with biopsy that was nondiagnostic. Underwent mediastinoscopy revealing squamous cell carcinoma. Completed concurrent chemo/RT with weekly Carbo/Taxol in mid-December 2017. Restaging CT Chest in late January 2018 revealed VA. Started maintenance Durvalumab in late Feb 2018 and completed treatment x 1 year in late February 2019. Monitored off treatment since late Feb 2019. Surveillance CT Chest in Sept 2019 with TA paramediastinal and paraspinal opacity with interval progression since January 2019 superimposed on radiation therapy changes which may represent tumor recurrence. Patient referred back to Dr. Liang. Underwent bronch on 10/1 in which a 3-4cm left hilar mass was visualized. TA BAL revealed atypical findings; the left hilar biopsy was non-diagnostic. PET/CT performed in September was concerning for recurrence in the left hilar/paramediastinal region. Underwent CT-guided left lung biopsy on 10/23/19 that was positive for squamous cell carcinoma. Started first line Crizotinib for metastatic disease in November 2019. Restaging CT Chest in late December 2019 reported as progression of left roxana-hilar mass, however, given post-RT changes, the findings can only be evaluated on PET/CT per Radiologist. PET/CT performed in early January revealed [id number] findings. F/U Brain MRI in early Feb 2020 revealed no change from prior; in d/w Radiologist, the right frontal subcortical region finding is still present and is suspected to be secondary to a vascular/vessel finding. Metastatic disease is not suspected. She discontinued Crizotinib in late April 2020 secondary to severe esophagitis despite dose-reduction. Started 2nd line Capmatinib in late May 2020; achieved [facility]. Dose increased in Jan 2021 in attempt at increased disease control. Case was reviewed at tumor board and disease was felt to be overall stable from prior however. Restaging CT in late March appeared to show increased obstruction/atelectasis of the TA possibly related to increased secretions. She was referred back to [facility] Liang and underwent Bronch on 5/5/21. Bronchoscopy revealed esophageal ulceration and tumor in the distal left main bronchus for which laser ablation was performed. Esophageal biopsies revealed ulcer and necrotic inflamed tissue. Left mainstem bronchus revealed her known poorly differentiated squamous cell carcinoma. Restaging PET/CT at that time with overall stable findings from prior radiologically, however given the bronchoscopic findings, systemic therapy changed to 3rd line Tepotinib (Tepmetko) in late May 2021 with disease progression in primary tumor evident on scan in July 2021. Discontinued Tepmetko the day prior to start of 4th line Gemcitabine in early August 2021 with disease progression after 3 cycles and her chemo was stopped in early October 2021 (1 month ago).  Her last PET/CT from 10/4/21 (compared to 7/26/21) showed:  1. Increased size/extent and FDG avidity of large difficult to delineate soft tissue left upper lobe/paramediastinal region extending into the perihilar and subcarinal region, concerning for progression of disease.  2. New or increased FDG avid left pleural thickening.  3. New FDG avid intramuscular focus right upper arm, indeterminate.  4. Unchanged diffuse thyroid and perianal FDG avidity.    Allergies  No Known Allergies      MEDICATIONS  (STANDING):  apixaban 2.5 milliGRAM(s) Oral every 12 hours  artificial  tears Solution 1 Drop(s) Both EYES four times a day  buDESOnide    Inhalation Suspension 0.5 milliGRAM(s) Inhalation daily  influenza  Vaccine (HIGH DOSE) 0.7 milliLiter(s) IntraMuscular once  levothyroxine 75 MICROGram(s) Oral daily  metoprolol succinate  milliGRAM(s) Oral at bedtime  ondansetron Injectable 4 milliGRAM(s) IV Push once  pantoprazole  Injectable 40 milliGRAM(s) IV Push daily    MEDICATIONS  (PRN):  acetaminophen     Tablet .. 650 milliGRAM(s) Oral every 6 hours PRN Mild Pain (1 - 3)  FIRST- Mouthwash  BLM 5 milliLiter(s) Swish and Spit every 6 hours PRN Mouth Care  hydrocodone/homatropine Syrup 5 milliLiter(s) Oral every 6 hours PRN Cough  oxyCODONE    IR 5 milliGRAM(s) Oral every 6 hours PRN Moderate Pain (4 - 6)      PAST MEDICAL & SURGICAL HISTORY:  Hypertension    Asthma    GERD (gastroesophageal reflux disease)    Hypothyroidism    Other nonspecific abnormal finding of lung field    Lung cancer  left upper lobe - received CHEMO RT and immunotherapy    Paralysis of vocal cords    Atrial fibrillation  dx.  3/29/21, to start anticoagulation post procedure per cardiology per pt    History of lung biopsy  2017    H/O colonoscopy    H/O vocal cord paralysis  filter injection 2018    History of laryngoscopy  thyroplasty with manning implant pharyngoplasty        FAMILY HISTORY:  Family history of cancer (Sibling) - colon cancer (brother)        SOCIAL HISTORY: No EtOH, no tobacco    REVIEW OF SYSTEMS:  CONSTITUTIONAL: + weight loss. No fever  EYES/ENT: No visual changes;  no throat pain   NECK: No pain or stiffness  RESPIRATORY: no sob  CARDIOVASCULAR: No chest pain or palpitations  GASTROINTESTINAL: No abdominal pain. No N/V/D/C  GENITOURINARY: No dysuria, change in frequency or hematuria  NEUROLOGICAL: No numbness or weakness  SKIN: No itching, burning, rashes, or lesions   Psych: No depression   MSK: no joint pain  Allergy: no urticaria       Weight (kg): 53.4 (11-05 @ 23:42)    T(F): 97.9 (11-06-21 @ 11:47), Max: 98.3 (11-05-21 @ 21:30)  HR: 79 (11-06-21 @ 11:47)  BP: 134/76 (11-06-21 @ 11:47)  RR: 18 (11-06-21 @ 11:47)  SpO2: 98% (11-06-21 @ 11:47)  Wt(kg): --    GENERAL: NAD  HEENT: EOMI, MMM, no oropharyngeal lesions or erythema appreciated  Pulm: no increased WOB, CTAB/L  CV: RRR, S1, S2, no m/g/r  ABDOMEN: soft, NT, ND, no masses felt, no HSM  MSK: nl ROM  EXTREMITIES:  no appreciable edema in b/l LE  Neuro: A&Ox3, no focal deficits  SKIN: warm and dry, no visible rash                          11.2   10.65 )-----------( 214      ( 05 Nov 2021 17:27 )             34.9       11-06    143  |  108<H>  |  9   ----------------------------<  74  3.2<L>   |  19<L>  |  0.69    Ca    8.0<L>      06 Nov 2021 08:21  Phos  2.4     11-06  Mg     2.40     11-06    TPro  6.2  /  Alb  2.9<L>  /  TBili  0.6  /  DBili  x   /  AST  20  /  ALT  21  /  AlkPhos  142<H>  11-05      Phosphorus Level, Serum: 2.4 mg/dL (11-06 @ 08:21)  Magnesium, Serum: 2.40 mg/dL (11-06 @ 08:21)       Divehi  (ID #[id number])  82 yo Divehi-speaking F with a PMH of hypothyroidism, GERD, HTN,  asthma, afib w/Eliquis, and squamous cell carcinoma of the lung (dx 2017, currently off chemo) with vocal cord paralysis who p/w worsening difficulty with eating x several weeks. She had has been unable to really eat much, eating solid foods slightly better than liquids. She denies pain with swallowing. She does choke a lot and also endorses some nausea/vomiting since her last dose of chemo one month ago. She has some achy upper abdominal pain that is improved with Pepcid. She also has a mildly productive cough.  She stated that she is not interested in further chemotherapy.    In the summer of 2016 she experienced a cough that was not resolving after its usual two-month course. She saw her doctor and was sent for a chest x-ray in October which was reportedly negative area and in March/April 2017, she developed hoarseness. She saw her pulmonologist and was prescribed nebulizers. In August 2017 she lost her voice and saw in ENT doctor and underwent a laryngoscopy reveals vocal cord paralysis. She was referred for CT scan of her chest that revealed a large left upper lobe mass with involvement of the recurrent laryngeal nerve causing the vocal cord paralysis. She underwent bronchoscopy with biopsy that was nondiagnostic. Underwent mediastinoscopy revealing squamous cell carcinoma. Completed concurrent chemo/RT with weekly Carbo/Taxol in mid-December 2017. Restaging CT Chest in late January 2018 revealed CA. Started maintenance Durvalumab in late Feb 2018 and completed treatment x 1 year in late February 2019. Monitored off treatment since late Feb 2019. Surveillance CT Chest in Sept 2019 with TA paramediastinal and paraspinal opacity with interval progression since January 2019 superimposed on radiation therapy changes which may represent tumor recurrence. Patient referred back to Dr. Liang. Underwent bronch on 10/1 in which a 3-4cm left hilar mass was visualized. TA BAL revealed atypical findings; the left hilar biopsy was non-diagnostic. PET/CT performed in September was concerning for recurrence in the left hilar/paramediastinal region. Underwent CT-guided left lung biopsy on 10/23/19 that was positive for squamous cell carcinoma. Started first line Crizotinib for metastatic disease in November 2019. Restaging CT Chest in late December 2019 reported as progression of left roxana-hilar mass, however, given post-RT changes, the findings can only be evaluated on PET/CT per Radiologist. PET/CT performed in early January revealed [id number] findings. F/U Brain MRI in early Feb 2020 revealed no change from prior; in d/w Radiologist, the right frontal subcortical region finding is still present and is suspected to be secondary to a vascular/vessel finding. Metastatic disease is not suspected. She discontinued Crizotinib in late April 2020 secondary to severe esophagitis despite dose-reduction. Started 2nd line Capmatinib in late May 2020; achieved CA. Dose increased in Jan 2021 in attempt at increased disease control. Case was reviewed at tumor board and disease was felt to be overall stable from prior however. Restaging CT in late March appeared to show increased obstruction/atelectasis of the TA possibly related to increased secretions. She was referred back to [facility] Liang and underwent Bronch on 5/5/21. Bronchoscopy revealed esophageal ulceration and tumor in the distal left main bronchus for which laser ablation was performed. Esophageal biopsies revealed ulcer and necrotic inflamed tissue. Left mainstem bronchus revealed her known poorly differentiated squamous cell carcinoma. Restaging PET/CT at that time with overall stable findings from prior radiologically, however given the bronchoscopic findings, systemic therapy changed to 3rd line Tepotinib (Tepmetko) in late May 2021 with disease progression in primary tumor evident on scan in July 2021. Discontinued Tepmetko the day prior to start of 4th line Gemcitabine in early August 2021 with disease progression after 3 cycles and her chemo was stopped in early October 2021 (1 month ago).  Her last PET/CT from 10/4/21 (compared to 7/26/21) showed:  1. Increased size/extent and FDG avidity of large difficult to delineate soft tissue left upper lobe/paramediastinal region extending into the perihilar and subcarinal region, concerning for progression of disease.  2. New or increased FDG avid left pleural thickening.  3. New FDG avid intramuscular focus right upper arm, indeterminate.  4. Unchanged diffuse thyroid and perianal FDG avidity.    Allergies  No Known Allergies      MEDICATIONS  (STANDING):  apixaban 2.5 milliGRAM(s) Oral every 12 hours  artificial  tears Solution 1 Drop(s) Both EYES four times a day  buDESOnide    Inhalation Suspension 0.5 milliGRAM(s) Inhalation daily  influenza  Vaccine (HIGH DOSE) 0.7 milliLiter(s) IntraMuscular once  levothyroxine 75 MICROGram(s) Oral daily  metoprolol succinate  milliGRAM(s) Oral at bedtime  ondansetron Injectable 4 milliGRAM(s) IV Push once  pantoprazole  Injectable 40 milliGRAM(s) IV Push daily    MEDICATIONS  (PRN):  acetaminophen     Tablet .. 650 milliGRAM(s) Oral every 6 hours PRN Mild Pain (1 - 3)  FIRST- Mouthwash  BLM 5 milliLiter(s) Swish and Spit every 6 hours PRN Mouth Care  hydrocodone/homatropine Syrup 5 milliLiter(s) Oral every 6 hours PRN Cough  oxyCODONE    IR 5 milliGRAM(s) Oral every 6 hours PRN Moderate Pain (4 - 6)      PAST MEDICAL & SURGICAL HISTORY:  Hypertension    Asthma    GERD (gastroesophageal reflux disease)    Hypothyroidism    Other nonspecific abnormal finding of lung field    Lung cancer  left upper lobe - received CHEMO RT and immunotherapy    Paralysis of vocal cords    Atrial fibrillation  dx.  3/29/21, to start anticoagulation post procedure per cardiology per pt    History of lung biopsy  2017    H/O colonoscopy    H/O vocal cord paralysis  filter injection 2018    History of laryngoscopy  thyroplasty with manning implant pharyngoplasty    FAMILY HISTORY:  Family history of cancer (Sibling) - colon cancer (brother)    SOCIAL HISTORY: No EtOH, no tobacco    REVIEW OF SYSTEMS:  CONSTITUTIONAL: + weight loss. No fever  EYES/ENT: No visual changes;  no throat pain   NECK: No pain or stiffness  RESPIRATORY: no sob  CARDIOVASCULAR: No chest pain or palpitations  GASTROINTESTINAL: No abdominal pain. No N/V/D/C  GENITOURINARY: No dysuria, change in frequency or hematuria  NEUROLOGICAL: No numbness or weakness  SKIN: No itching, burning, rashes, or lesions   Psych: No depression   MSK: no joint pain  Allergy: no urticaria     Weight (kg): 53.4 (11-05 @ 23:42)    T(F): 97.9 (11-06-21 @ 11:47), Max: 98.3 (11-05-21 @ 21:30)  HR: 79 (11-06-21 @ 11:47)  BP: 134/76 (11-06-21 @ 11:47)  RR: 18 (11-06-21 @ 11:47)  SpO2: 98% (11-06-21 @ 11:47)  Wt(kg): --    GENERAL: NAD  HEENT: EOMI, MMM, no oropharyngeal lesions or erythema appreciated  Pulm: no increased WOB, CTAB/L  CV: RRR, S1, S2, no m/g/r  ABDOMEN: soft, NT, ND, no masses felt, no HSM  MSK: nl ROM  EXTREMITIES:  no appreciable edema in b/l LE  Neuro: A&Ox3, no focal deficits  SKIN: warm and dry, no visible rash                          11.2   10.65 )-----------( 214      ( 05 Nov 2021 17:27 )             34.9       11-06    143  |  108<H>  |  9   ----------------------------<  74  3.2<L>   |  19<L>  |  0.69    Ca    8.0<L>      06 Nov 2021 08:21  Phos  2.4     11-06  Mg     2.40     11-06    TPro  6.2  /  Alb  2.9<L>  /  TBili  0.6  /  DBili  x   /  AST  20  /  ALT  21  /  AlkPhos  142<H>  11-05      Phosphorus Level, Serum: 2.4 mg/dL (11-06 @ 08:21)  Magnesium, Serum: 2.40 mg/dL (11-06 @ 08:21)

## 2021-11-06 NOTE — H&P ADULT - NSHPSOCIALHISTORY_GEN_ALL_CORE
Home: Domiciled   ADL: Full ADL   Next of Kin:  Alcohol: Denies  Smoking: Denies   Drugs: Denies Home: Domiciled   ADL: Full ADL   Alcohol: Denies  Smoking: Denies   Drugs: Denies

## 2021-11-06 NOTE — H&P ADULT - ATTENDING COMMENTS
I agree with the above H&P from ACP/Resident/Intern. In addition:  HPI: 81F Hx hypothyroidism, GERD, HTN,  asthma, afib w/ Eliquis, metoprolol, Cardizem, NSCLC (2017) now on 4th line chemo: Venofer/ Gemzar  (10/6) p/w worsening dysphagia and now found to have chest palpitations in AFIB RVR. Also c/o abd pain and pressing chest pain at least since her last chemo. +N/V/productive cough. No fevers. Per conversation with resident, patient expresses understanding at this time regarding her GOC. She wants to be more comfortable and pursue hospice/palliative care.  Labs: Reviewed  Imaging: Reviewed  EKG: Reviewed  PHYSICAL EXAM:  GENERAL: Weak and frail appearing,   CHEST/LUNG: Clear to auscultation bilaterally; No wheezes, rales or rhonchi  HEART: Regular rate and rhythm; No murmurs, rubs, or gallops, (+)S1, S2  ABDOMEN: Soft, Nontender, Nondistended; Normal Bowel sounds   EXTREMITIES:  2+ Peripheral Pulses, No clubbing, cyanosis, or edema  A/P: 81F with Lung Ca (unsure if this is metastatic but certainly aggressive) now with Afib RVR and pursuing palliative care.  #Rapid Afib: continue home AV blocking agents  -Correct K+  #Palliative care: consult in AM. MOLST form filled  -Will need Onc input regarding prognosis

## 2021-11-06 NOTE — H&P ADULT - NSHPPHYSICALEXAM_GEN_ALL_CORE
Objective:    Vitals: Vital Signs Last 24 Hrs  T(C): 36.5 (11-05-21 @ 23:42), Max: 36.8 (11-05-21 @ 21:30)  T(F): 97.7 (11-05-21 @ 23:42), Max: 98.3 (11-05-21 @ 21:30)  HR: 123 (11-05-21 @ 23:42) (50 - 123)  BP: 140/71 (11-05-21 @ 23:42) (105/81 - 145/53)  BP(mean): --  RR: 18 (11-05-21 @ 23:42) (14 - 19)  SpO2: 99% (11-05-21 @ 23:42) (97% - 100%)            I&O's Summary      PHYSICAL EXAM:  GENERAL: NAD, well-groomed, well-developed  HEAD:  Atraumatic, Normocephalic  EYES: EOMI, PERRLA, conjunctiva and sclera clear  ENMT: No tonsillar erythema, exudates, or enlargement; Moist mucous membranes, Good dentition, No lesions  NECK: Supple, No JVD, Normal thyroid  CHEST/LUNG: Clear to auscultation bilaterally; No rales, rhonchi, wheezing, or rubs  HEART: Regular rate and rhythm; No murmurs, rubs, or gallops  ABDOMEN: Soft, Nontender, Nondistended; Bowel sounds present  EXTREMITIES:  2+ Peripheral Pulses, No clubbing, cyanosis, or edema  LYMPH: No lymphadenopathy noted  SKIN: No rashes or lesions  NERVOUS SYSTEM:  Alert & Oriented X4, Good concentration  PSYCH: Normal Affect. Speaking in Full Sentences. Laying in bed comfortably; not agitated Objective:    Vitals: Vital Signs Last 24 Hrs  T(C): 36.5 (11-05-21 @ 23:42), Max: 36.8 (11-05-21 @ 21:30)  T(F): 97.7 (11-05-21 @ 23:42), Max: 98.3 (11-05-21 @ 21:30)  HR: 123 (11-05-21 @ 23:42) (50 - 123)  BP: 140/71 (11-05-21 @ 23:42) (105/81 - 145/53)  BP(mean): --  RR: 18 (11-05-21 @ 23:42) (14 - 19)  SpO2: 99% (11-05-21 @ 23:42) (97% - 100%)            I&O's Summary      PHYSICAL EXAM:  GENERAL: NAD, well-groomed, well-developed  HEAD:  Atraumatic, Normocephalic  EYES: EOMI, PERRLA, conjunctiva and sclera clear  NECK: Supple, No JVD, Normal thyroid  CHEST/LUNG: Transmitted upper airway sounds,   HEART: irRegular rate and rhythm; No murmurs, rubs, or gallops  ABDOMEN: Soft, mildly tender, Nondistended; Bowel sounds present  NERVOUS SYSTEM:  Alert & Oriented X4, Good concentration  PSYCH: Normal Affect. Speaking in Full Sentences. Laying in bed comfortably; not agitated Objective:    Vitals: Vital Signs Last 24 Hrs  T(C): 36.5 (11-05-21 @ 23:42), Max: 36.8 (11-05-21 @ 21:30)  T(F): 97.7 (11-05-21 @ 23:42), Max: 98.3 (11-05-21 @ 21:30)  HR: 123 (11-05-21 @ 23:42) (50 - 123)  BP: 140/71 (11-05-21 @ 23:42) (105/81 - 145/53)  BP(mean): --  RR: 18 (11-05-21 @ 23:42) (14 - 19)  SpO2: 99% (11-05-21 @ 23:42) (97% - 100%)            I&O's Summary      PHYSICAL EXAM:  GENERAL: NAD, well-groomed, well-developed  HEAD:  Atraumatic, Normocephalic  EYES: EOMI, PERRLA, conjunctiva and sclera clear  NECK: Supple, No JVD, Normal thyroid  CHEST/LUNG: Transmitted upper airway sounds, decreased sounds on left  HEART: irRegular rate and rhythm; No murmurs, rubs, or gallops  ABDOMEN: Soft, mildly tender, Nondistended; Bowel sounds present  NERVOUS SYSTEM:  Alert & Oriented X4, Good concentration  PSYCH: Normal Affect. Speaking in Full Sentences. Laying in bed comfortably; not agitated Objective:    Vitals: Vital Signs Last 24 Hrs  T(C): 36.5 (11-05-21 @ 23:42), Max: 36.8 (11-05-21 @ 21:30)  T(F): 97.7 (11-05-21 @ 23:42), Max: 98.3 (11-05-21 @ 21:30)  HR: 123 (11-05-21 @ 23:42) (50 - 123)  BP: 140/71 (11-05-21 @ 23:42) (105/81 - 145/53)  BP(mean): --  RR: 18 (11-05-21 @ 23:42) (14 - 19)  SpO2: 99% (11-05-21 @ 23:42) (97% - 100%)            I&O's Summary    PHYSICAL EXAM:  GENERAL: NAD, well-groomed, well-developed  HEAD:  Atraumatic, Normocephalic  EYES: EOMI, PERRLA, conjunctiva and sclera clear  NECK: Supple, No JVD, Normal thyroid  CHEST/LUNG: Transmitted upper airway sounds, decreased sounds on left  HEART: irRegular rate and rhythm; No murmurs, rubs, or gallops  ABDOMEN: Soft, mildly tender, Nondistended; Bowel sounds present  NERVOUS SYSTEM:  Alert & Oriented X4, Good concentration  PSYCH: Normal Affect. Speaking in Full Sentences. Laying in bed comfortably; not agitated

## 2021-11-06 NOTE — H&P ADULT - PROBLEM SELECTOR PLAN 3
NSCLC on 4th line chemotherapy   - unable to really take much PO, soft foods and soup with abd pain and pressure   - Now DNR/DNI no pressors, comfort care, palliative and hospice consult  - oncology consult NSCLC on 4th line chemotherapy   - unable to really take much PO, soft foods and soup with abd pain and pressure   - Now DNR/DNI no pressors, comfort care, palliative and hospice consult  - oncology consult in AM for prognosis

## 2021-11-06 NOTE — PROGRESS NOTE ADULT - PROBLEM SELECTOR PLAN 3
NSCLC on 4th line chemotherapy   - unable to really take much PO, soft foods and soup with abd pain and pressure   - Now DNR/DNI no pressors, comfort care, palliative and hospice consult  - oncology consult in AM for prognosis

## 2021-11-06 NOTE — CONSULT NOTE ADULT - SUBJECTIVE AND OBJECTIVE BOX
HPI: 80yo F w/ hypothyroidism, GERD, HTN,  asthma, afib w/ Eliquis, metoprolol, Cardizem, NSCLC (2017) now on 4th line chemo: Venofer/ Gemzar  (10/6) p/w worsening difficulty with eating xweeks. She had has been unable to really eat much. She had abd pain and pressing chest pain at least since her last chemo. +N/V/productive cough. No fevers. At this time when asked about her understanding re: her cancer, she says that "there is nothing left to do but to die" and that she would like to go peacefully.   (06 Nov 2021 00:59)  	   No Known Allergies    MEDICATIONS:  apixaban 2.5 milliGRAM(s) Oral every 12 hours  metoprolol succinate  milliGRAM(s) Oral at bedtime  buDESOnide    Inhalation Suspension 0.5 milliGRAM(s) Inhalation daily  hydrocodone/homatropine Syrup 5 milliLiter(s) Oral every 6 hours PRN  acetaminophen     Tablet .. 650 milliGRAM(s) Oral every 6 hours PRN  ondansetron Injectable 4 milliGRAM(s) IV Push once  oxyCODONE    IR 5 milliGRAM(s) Oral every 6 hours PRN  pantoprazole  Injectable 40 milliGRAM(s) IV Push daily  levothyroxine 75 MICROGram(s) Oral daily  artificial  tears Solution 1 Drop(s) Both EYES four times a day  FIRST- Mouthwash  BLM 5 milliLiter(s) Swish and Spit every 6 hours PRN  influenza  Vaccine (HIGH DOSE) 0.7 milliLiter(s) IntraMuscular once    PAST MEDICAL & SURGICAL HISTORY:  Hypertension  Asthma  GERD (gastroesophageal reflux disease)  Hypothyroidism  Other nonspecific abnormal finding of lung field  Lung cancer  left upper lobe - received CHEMO RT and immunotherapy  Paralysis of vocal cords  Atrial fibrillation  dx.  3/29/21, to start anticoagulation post procedure per cardiology per pt  History of lung biopsy  2017  H/O colonoscopy  H/O vocal cord paralysis  filter injection 2018  History of laryngoscopy  thyroplasty with manning implant pharyngoplasty    FAMILY HISTORY:  Family history of cancer (Sibling)    SUBSTANCE USE  Tobacco Usage:  denies  Alcohol Usage: denies  Recreational drugs: denies    REVIEW OF SYSTEMS:  CONSTITUTIONAL: No fevers, No chills, No fatigue, No weight gain  RESPIRATORY: + shortness of breath, No cough, No wheezing, No hemoptysis  CARDIOVASCULAR: No chest pain. No palpitations, No pleuritic pain  GASTROINTESTINAL: No abdominal pain, No nausea, No vomiting, No hematemesis, No diarrhea No constipation. No melena  GENITOURINARY: No dysuria, No frequency, No incontinence, No hematuria  NEUROLOGICAL: No dizziness, No lightheadedness, No syncope, No LOC, No headache, No numbness or weakness  EXTREMITIES: No Edema, No joint pain, No joint swelling.  SKIN: No diaphoresis. No itching, No rashes, No pressure ulcers  All other review of systems is negative unless indicated above.    T(C): 36.6 (11-06-21 @ 11:47), Max: 36.6 (11-06-21 @ 11:47)  HR: 150 (11-06-21 @ 20:06) (79 - 150)  BP: 121/76 (11-06-21 @ 20:06) (109/60 - 140/71)  RR: 18 (11-06-21 @ 11:47) (18 - 18)  SpO2: 98% (11-06-21 @ 11:47) (98% - 100%)    I&O's Summary    06 Nov 2021 08:01  -  06 Nov 2021 21:42  --------------------------------------------------------  IN: 600 mL / OUT: 0 mL / NET: 600 mL    Physical Exam:  General: NAD  Cardiovascular: Normal S1 S2, No JVD, No murmurs, No edema  Respiratory: Lungs clear to auscultation	  Gastrointestinal:  Soft, Non-tender, + BS	  Skin: warm and dry, No rashes, No ecchymoses, No cyanosis	  Extremities:  No clubbing, cyanosis or edema  Vascular: Peripheral pulses palpable 2+ bilaterally    CBC Full  -  ( 05 Nov 2021 17:27 )  WBC Count : 10.65 K/uL  Hemoglobin : 11.2 g/dL  Hematocrit : 34.9 %  Platelet Count - Automated : 214 K/uL  Mean Cell Volume : 92.6 fL  Mean Cell Hemoglobin : 29.7 pg  Mean Cell Hemoglobin Concentration : 32.1 gm/dL  Auto Neutrophil # : 7.41 K/uL  Auto Lymphocyte # : 2.46 K/uL  Auto Monocyte # : 0.68 K/uL  Auto Eosinophil # : 0.03 K/uL  Auto Basophil # : 0.02 K/uL  Auto Neutrophil % : 69.5 %  Auto Lymphocyte % : 23.1 %  Auto Monocyte % : 6.4 %  Auto Eosinophil % : 0.3 %  Auto Basophil % : 0.2 %    11-06    143  |  108<H>  |  9   ----------------------------<  74  3.2<L>   |  19<L>  |  0.69  11-05    142  |  106  |  8   ----------------------------<  105<H>  2.7<LL>   |  23  |  0.73    Ca    8.0<L>      06 Nov 2021 08:21  Ca    8.5      05 Nov 2021 17:27  Phos  2.4     11-06  Mg     2.40     11-06    TPro  6.2  /  Alb  2.9<L>  /  TBili  0.6  /  DBili  x   /  AST  20  /  ALT  21  /  AlkPhos  142<H>  11-05 HPI: 81 year old F w/ hypothyroidism, GERD, HTN,  asthma, afib w/ Eliquis, metoprolol, Cardizem, NSCLC (2017) now on 4th line chemo: Venofer/ Gemzar  (10/6) p/w worsening difficulty with eating xweeks. She had has been unable to really eat much. She had abd pain and pressing chest pain at least since her last chemo. +N/V/productive cough. No fevers. At this time when asked about her understanding re: her cancer, she says that "there is nothing left to do but to die" and that she would like to go peacefully.   	   No Known Allergies    MEDICATIONS:  apixaban 2.5 milliGRAM(s) Oral every 12 hours  metoprolol succinate  milliGRAM(s) Oral at bedtime  buDESOnide    Inhalation Suspension 0.5 milliGRAM(s) Inhalation daily  hydrocodone/homatropine Syrup 5 milliLiter(s) Oral every 6 hours PRN  acetaminophen     Tablet .. 650 milliGRAM(s) Oral every 6 hours PRN  ondansetron Injectable 4 milliGRAM(s) IV Push once  oxyCODONE    IR 5 milliGRAM(s) Oral every 6 hours PRN  pantoprazole  Injectable 40 milliGRAM(s) IV Push daily  levothyroxine 75 MICROGram(s) Oral daily  artificial  tears Solution 1 Drop(s) Both EYES four times a day  FIRST- Mouthwash  BLM 5 milliLiter(s) Swish and Spit every 6 hours PRN  influenza  Vaccine (HIGH DOSE) 0.7 milliLiter(s) IntraMuscular once    PAST MEDICAL & SURGICAL HISTORY:  Hypertension  Asthma  GERD (gastroesophageal reflux disease)  Hypothyroidism  Other nonspecific abnormal finding of lung field  Lung cancer  left upper lobe - received CHEMO RT and immunotherapy  Paralysis of vocal cords  Atrial fibrillation  dx.  3/29/21, to start anticoagulation post procedure per cardiology per pt  History of lung biopsy  2017  H/O colonoscopy  H/O vocal cord paralysis  filter injection 2018  History of laryngoscopy  thyroplasty with manning implant pharyngoplasty    FAMILY HISTORY:  Family history of cancer (Sibling)    SUBSTANCE USE  Tobacco Usage:  denies  Alcohol Usage: denies  Recreational drugs: denies    REVIEW OF SYSTEMS:  CONSTITUTIONAL: No fevers, No chills, No fatigue, No weight gain  RESPIRATORY: + shortness of breath, No cough, No wheezing, No hemoptysis  CARDIOVASCULAR: No chest pain. No palpitations, No pleuritic pain  GASTROINTESTINAL: No abdominal pain, No nausea, No vomiting, No hematemesis, No diarrhea No constipation. No melena  GENITOURINARY: No dysuria, No frequency, No incontinence, No hematuria  NEUROLOGICAL: No dizziness, No lightheadedness, No syncope, No LOC, No headache, No numbness or weakness  EXTREMITIES: No Edema, No joint pain, No joint swelling.  SKIN: No diaphoresis. No itching, No rashes, No pressure ulcers  All other review of systems is negative unless indicated above.    T(C): 36.6 (11-06-21 @ 11:47), Max: 36.6 (11-06-21 @ 11:47)  HR: 150 (11-06-21 @ 20:06) (79 - 150)  BP: 121/76 (11-06-21 @ 20:06) (109/60 - 140/71)  RR: 18 (11-06-21 @ 11:47) (18 - 18)  SpO2: 98% (11-06-21 @ 11:47) (98% - 100%)    I&O's Summary    06 Nov 2021 08:01  -  06 Nov 2021 21:42  --------------------------------------------------------  IN: 600 mL / OUT: 0 mL / NET: 600 mL    Physical Exam:  General: NAD  Cardiovascular: irregular rhythm, No JVD, No murmurs, No edema  Respiratory: diminished throughout lung fields  Gastrointestinal:  Soft, Non-tender, + BS	  Skin: warm and dry, No rashes, No ecchymoses, No cyanosis	  Extremities:  No clubbing, cyanosis or edema  Vascular: Peripheral pulses palpable 2+ bilaterally    CBC Full  -  ( 05 Nov 2021 17:27 )  WBC Count : 10.65 K/uL  Hemoglobin : 11.2 g/dL  Hematocrit : 34.9 %  Platelet Count - Automated : 214 K/uL  Mean Cell Volume : 92.6 fL  Mean Cell Hemoglobin : 29.7 pg  Mean Cell Hemoglobin Concentration : 32.1 gm/dL  Auto Neutrophil # : 7.41 K/uL  Auto Lymphocyte # : 2.46 K/uL  Auto Monocyte # : 0.68 K/uL  Auto Eosinophil # : 0.03 K/uL  Auto Basophil # : 0.02 K/uL  Auto Neutrophil % : 69.5 %  Auto Lymphocyte % : 23.1 %  Auto Monocyte % : 6.4 %  Auto Eosinophil % : 0.3 %  Auto Basophil % : 0.2 %    11-06    143  |  108<H>  |  9   ----------------------------<  74  3.2<L>   |  19<L>  |  0.69  11-05    142  |  106  |  8   ----------------------------<  105<H>  2.7<LL>   |  23  |  0.73    Ca    8.0<L>      06 Nov 2021 08:21  Ca    8.5      05 Nov 2021 17:27  Phos  2.4     11-06  Mg     2.40     11-06    TPro  6.2  /  Alb  2.9<L>  /  TBili  0.6  /  DBili  x   /  AST  20  /  ALT  21  /  AlkPhos  142<H>  11-05

## 2021-11-06 NOTE — H&P ADULT - HISTORY OF PRESENT ILLNESS
81F Hx hypothyroidism, GERD, HTN,  asthma, afib w/ Eliquis, metoprolol, Cardizem, lung cancer s/p chemo and radiation &y immunotherapy 02/2019 but currently getting Venofer/ Gemzar last dose 10/6 p/w worsening difficulty with eating xweeks 81F Hx hypothyroidism, GERD, HTN,  asthma, afib w/ Eliquis, metoprolol, Cardizem, NSCLC (2017) on 4th line chemo: Venofer/ Gemzar  (10/6) p/w worsening difficulty with eating xweeks 81F Hx hypothyroidism, GERD, HTN,  asthma, afib w/ Eliquis, metoprolol, Cardizem, NSCLC (2017) now on 4th line chemo: Venofer/ Gemzar  (10/6) p/w worsening difficulty with eating xweeks. She had has been unable to really eat much. She had abd pain and pressing chest pain at least since her last chemo. +N/V/productive cough. No fevers. At this time when asked about her understanding re: her cancer, she says that "there is nothing left to do but to die" and that she would like to go peacefully.

## 2021-11-06 NOTE — H&P ADULT - PROBLEM SELECTOR PLAN 8
DVT PPx: On eliquis  Diet: Regular  Code: DNR/DNI  Dispo: Pending Hospital Course  Communication: Spoke with patient 2am 11/6 with

## 2021-11-06 NOTE — CONSULT NOTE ADULT - ASSESSMENT
80yo Azeri-speaking F w/ Afib on Eliquis, hypothyroidism, GERD, HTN,  asthma, and squamous cell carcinoma of the lung (dx 2017, currently off chemo) with vocal cord paralysis who p/w worsening difficulty with eating x several weeks, a/w sob and failure to thrive. Cardiology called for "tachy - vu syndrome" patient with Afib RVR received metoprolol however difficult to rate control and so was given cardizem, subsquently patient temporarily vu to "40's"?.     # Afib RVR  Patient with Afib RVR up to 150s, received ... . HR at present   Has h/o Afib and is on Eliquis and Toprol Xl 100mg daily at home.   Continuous tele monitoring  Rate control: C/w home dose of Eliquis and toprol.  Goal HR <110  Stop Cardizem prn,   Echo: TTE in am to evaluate function and to r/o thrombus  Check thyroid panel   Monitor lytes and replete as needed.    Thank you, if any questions or clinical situation changes please call [facility] #[phone].  Attending Attestation to follow 80yo Spanish-speaking F w/ Afib on Eliquis, hypothyroidism, GERD, HTN,  asthma, and squamous cell carcinoma of the lung (dx 2017, currently off chemo) with vocal cord paralysis who p/w worsening difficulty with eating x several weeks, a/w sob and failure to thrive. Cardiology called for "tachy - vu syndrome" patient with Afib RVR received metoprolol however difficult to rate control and so was given cardizem, subsquently patient temporarily vu to "40's"?.     # Afib RVR  Patient with Afib RVR up to 150s, received Cardizem 10 mg IV x 1 doses. HR at present 90s on telemetry.   Has h/o Afib and is on Eliquis and Toprol Xl 100mg daily at home.   Continuous tele monitoring  Rate control: C/w home dose of Eliquis and Toprol.  Goal HR <110  Stop Cardizem prn. Would recommend giving Lopressor IV PRN instead to minimize bradycardic episodes.   Echo: TTE in am to evaluate function and to r/o thrombus  Check thyroid panel   Monitor lytes and replete as needed.    Thank you, if any questions or clinical situation changes please call [facility] #[phone].  Attending Attestation to follow

## 2021-11-06 NOTE — H&P ADULT - NSHPREVIEWOFSYSTEMS_GEN_ALL_CORE
REVIEW OF SYSTEMS:  CONSTITUTIONAL: No weakness, fevers, chills, sick contacts, or unintended weight loss  EYES: No visual changes or vertigo  ENT: No throat pain, rhinorrhea, or hearing loss   NECK: No pain or stiffness  RESPIRATORY: No cough, wheezing, hemoptysis; No shortness of breath  CARDIOVASCULAR: No chest pain or palpitations  GASTROINTESTINAL: No abdominal or epigastric pain. No nausea, vomiting, or hematemesis; No diarrhea or constipation. No melena or hematochezia.  GENITOURINARY: No dysuria, frequency or hematuria  NEUROLOGICAL: No numbness or weakness  SKIN: No itching, rashes, or bruises  Psych: Good mood, no substance use REVIEW OF SYSTEMS:  CONSTITUTIONAL: + weakness. No fevers, chills, sick contacts. + unintended weight loss  EYES: No visual changes or vertigo  ENT: No throat pain, rhinorrhea, or hearing loss   NECK: No pain or stiffness  RESPIRATORY: +cough. No wheezing, hemoptysis. + shortness of breath  CARDIOVASCULAR: +chest pain. No palpitations  GASTROINTESTINAL: +abdominal or epigastric pain. + nausea, vomiting. No hematemesis; No diarrhea or constipation. No melena or hematochezia.  GENITOURINARY: No dysuria, frequency or hematuria  NEUROLOGICAL: No numbness or weakness  SKIN: No itching, rashes, or bruises  Psych: Sad, no substance use

## 2021-11-07 NOTE — PROGRESS NOTE ADULT - PROBLEM SELECTOR PLAN 1
Known Afib; had afib w/ rvr to 140s   - s/p 120mg diltiazem and metoprolol succinate 100mg   - Given malignancy, general profile, though not currently hypoxic can consider PE but at this time given DNR DNI comfort, will not pursue further studies  - EKG showed Afib QTc: 464  Telemetry was reviewed patient is A fib w/RVR  c/w BB, Apixaban

## 2021-11-07 NOTE — PROGRESS NOTE ADULT - SUBJECTIVE AND OBJECTIVE BOX
Patient seen and examined at bedside.    Overnight Events:     Review Of Systems: No chest pain, shortness of breath, or palpitations            Current Meds:  acetaminophen     Tablet .. 650 milliGRAM(s) Oral every 6 hours PRN  apixaban 2.5 milliGRAM(s) Oral every 12 hours  artificial  tears Solution 1 Drop(s) Both EYES four times a day  buDESOnide    Inhalation Suspension 0.5 milliGRAM(s) Inhalation daily  FIRST- Mouthwash  BLM 5 milliLiter(s) Swish and Spit every 6 hours PRN  hydrocodone/homatropine Syrup 5 milliLiter(s) Oral every 6 hours PRN  influenza  Vaccine (HIGH DOSE) 0.7 milliLiter(s) IntraMuscular once  levothyroxine 75 MICROGram(s) Oral daily  metoprolol succinate  milliGRAM(s) Oral at bedtime  ondansetron Injectable 4 milliGRAM(s) IV Push once  oxyCODONE    IR 5 milliGRAM(s) Oral every 6 hours PRN  pantoprazole  Injectable 40 milliGRAM(s) IV Push daily      Vitals:  T(F): 98.3 (11-07), Max: 98.3 (11-07)  HR: 120 (11-07) (72 - 150)  BP: 101/52 (11-07) (101/52 - 134/76)  RR: 18 (11-07)  SpO2: 100% (11-07)  I&O's Summary    06 Nov 2021 08:01  -  07 Nov 2021 07:00  --------------------------------------------------------  IN: 600 mL / OUT: 0 mL / NET: 600 mL        Physical Exam:  Appearance: No acute distress; well appearing  HEENT:  EOMI, sclera anicteric, Normal oral mucosa  Cardiovascular: RRR, S1, S2, no murmurs, rubs, or gallops; no edema; no JVD  Respiratory: Clear to auscultation bilaterally, no wheezes, rales, rhonchi  Gastrointestinal: soft, non-tender, non-distended with normal bowel sounds  Musculoskeletal: No clubbing; no joint deformity   Neurologic: Non-focal  Lymphatic: No lymphadenopathy  Psychiatry: AAOx3, mood & affect appropriate  Skin: No rashes, ecchymoses, or cyanosis                          11.2   10.65 )-----------( 214      ( 05 Nov 2021 17:27 )             34.9     11-06    143  |  108<H>  |  9   ----------------------------<  74  3.2<L>   |  19<L>  |  0.69    Ca    8.0<L>      06 Nov 2021 08:21  Phos  2.4     11-06  Mg     2.40     11-06    TPro  6.2  /  Alb  2.9<L>  /  TBili  0.6  /  DBili  x   /  AST  20  /  ALT  21  /  AlkPhos  142<H>  11-05                  New ECG(s): Personally reviewed    Echo:    Stress Testing:     Cath:    Imaging:    Interpretation of Telemetry:   Patient seen and examined at bedside.    Overnight Events: NAEO, no complaints.    Review Of Systems: No chest pain, shortness of breath, or palpitations            Current Meds:  acetaminophen     Tablet .. 650 milliGRAM(s) Oral every 6 hours PRN  apixaban 2.5 milliGRAM(s) Oral every 12 hours  artificial  tears Solution 1 Drop(s) Both EYES four times a day  buDESOnide    Inhalation Suspension 0.5 milliGRAM(s) Inhalation daily  FIRST- Mouthwash  BLM 5 milliLiter(s) Swish and Spit every 6 hours PRN  hydrocodone/homatropine Syrup 5 milliLiter(s) Oral every 6 hours PRN  influenza  Vaccine (HIGH DOSE) 0.7 milliLiter(s) IntraMuscular once  levothyroxine 75 MICROGram(s) Oral daily  metoprolol succinate  milliGRAM(s) Oral at bedtime  ondansetron Injectable 4 milliGRAM(s) IV Push once  oxyCODONE    IR 5 milliGRAM(s) Oral every 6 hours PRN  pantoprazole  Injectable 40 milliGRAM(s) IV Push daily      Vitals:  T(F): 98.3 (11-07), Max: 98.3 (11-07)  HR: 120 (11-07) (72 - 150)  BP: 101/52 (11-07) (101/52 - 134/76)  RR: 18 (11-07)  SpO2: 100% (11-07)  I&O's Summary    06 Nov 2021 08:01  -  07 Nov 2021 07:00  --------------------------------------------------------  IN: 600 mL / OUT: 0 mL / NET: 600 mL        Physical Exam:  Appearance: No acute distress; well appearing  HEENT:  EOMI, sclera anicteric, Normal oral mucosa  Cardiovascular: irregularly irregular, S1, S2, no murmurs, rubs, or gallops; no edema; no JVD  Respiratory: Clear to auscultation bilaterally, no wheezes, rales, rhonchi  Gastrointestinal: soft, non-tender, non-distended with normal bowel sounds  Musculoskeletal: No clubbing; no joint deformity   Neurologic: Non-focal  Lymphatic: No lymphadenopathy  Psychiatry: AAOx3, mood & affect appropriate  Skin: No rashes, ecchymoses, or cyanosis                          11.2   10.65 )-----------( 214      ( 05 Nov 2021 17:27 )             34.9     11-06    143  |  108<H>  |  9   ----------------------------<  74  3.2<L>   |  19<L>  |  0.69    Ca    8.0<L>      06 Nov 2021 08:21  Phos  2.4     11-06  Mg     2.40     11-06    TPro  6.2  /  Alb  2.9<L>  /  TBili  0.6  /  DBili  x   /  AST  20  /  ALT  21  /  AlkPhos  142<H>  11-05        Interpretation of Telemetry: AF 80s-90s with bursts up to 150s

## 2021-11-07 NOTE — PROGRESS NOTE ADULT - PROBLEM SELECTOR PLAN 2
NSCLC on 4th line chemotherapy   - unable to really take much PO, soft foods and soup with abd pain and pressure   - Now DNR/DNI no pressors, comfort care, palliative and hospice consult   - first blm mouthwash and hicodine for cough  Patient is dehydrated on exam which can worsen A fib w/RVR, start NS @75 ml/h

## 2021-11-07 NOTE — PROGRESS NOTE ADULT - PROBLEM SELECTOR PLAN 3
NSCLC on 4th line chemotherapy   - unable to really take much PO, soft foods and soup with abd pain and pressure   - Now DNR/DNI no pressors, comfort care, palliative and hospice consult  - oncology consult  for prognosis

## 2021-11-07 NOTE — PROGRESS NOTE ADULT - SUBJECTIVE AND OBJECTIVE BOX
[facility] Division of Hospital Medicine  Lore Cortez MD  Pager (M-F, 8A-5P): [phone]      Patient is a 81y old  Female who presents with a chief complaint of Afib with RVR (07 Nov 2021 08:11)      SUBJECTIVE / OVERNIGHT EVENTS: patient is dehydrated , poor oral intake pending   ADDITIONAL REVIEW OF SYSTEMS: palliative consult pending   patient appears dehydrated     MEDICATIONS  (STANDING):  apixaban 2.5 milliGRAM(s) Oral every 12 hours  artificial  tears Solution 1 Drop(s) Both EYES four times a day  buDESOnide    Inhalation Suspension 0.5 milliGRAM(s) Inhalation daily  influenza  Vaccine (HIGH DOSE) 0.7 milliLiter(s) IntraMuscular once  levothyroxine 75 MICROGram(s) Oral daily  metoprolol succinate  milliGRAM(s) Oral at bedtime  pantoprazole  Injectable 40 milliGRAM(s) IV Push daily  sodium chloride 0.9%. 1000 milliLiter(s) (75 mL/Hr) IV Continuous <Continuous>    MEDICATIONS  (PRN):  acetaminophen     Tablet .. 650 milliGRAM(s) Oral every 6 hours PRN Mild Pain (1 - 3)  FIRST- Mouthwash  BLM 5 milliLiter(s) Swish and Spit every 6 hours PRN Mouth Care  hydrocodone/homatropine Syrup 5 milliLiter(s) Oral every 6 hours PRN Cough  oxyCODONE    IR 5 milliGRAM(s) Oral every 6 hours PRN Moderate Pain (4 - 6)      CAPILLARY BLOOD GLUCOSE        I&O's Summary    06 Nov 2021 08:01  -  07 Nov 2021 07:00  --------------------------------------------------------  IN: 600 mL / OUT: 0 mL / NET: 600 mL        PHYSICAL EXAM:  Vital Signs Last 24 Hrs  T(C): 36.7 (07 Nov 2021 11:44), Max: 36.8 (06 Nov 2021 22:16)  T(F): 98 (07 Nov 2021 11:44), Max: 98.3 (07 Nov 2021 06:06)  HR: 134 (07 Nov 2021 15:00) (72 - 150)  BP: 112/66 (07 Nov 2021 15:00) (101/52 - 133/71)  BP(mean): --  RR: 18 (07 Nov 2021 11:44) (18 - 18)  SpO2: 99% (07 Nov 2021 11:44) (99% - 100%)  CONSTITUTIONAL: NAD, well-developed, well-groomed  EYES: EOMI; conjunctiva and sclera clear  ENMT: Moist oral mucosa,dry   NECK: Supple, no palpable masses; no thyromegaly  RESPIRATORY: Normal respiratory effort; lungs are clear to auscultation bilaterally  CARDIOVASCULAR: Atrial fibrillation , RVR  ABDOMEN: Nontender to palpation, normoactive bowel sounds, no rebound/guarding;  MUSKULOSKELETAL:  no clubbing or cyanosis of digits; no joint swelling or tenderness to palpation  PSYCH: A+O to person, place, and time; affect appropriate  NEUROLOGY: CN 2-12 are intact and symmetric; no gross sensory deficits;   SKIN: No rashes; no palpable lesions    LABS:                        11.2   10.65 )-----------( 214      ( 05 Nov 2021 17:27 )             34.9     11-07    144  |  109<H>  |  13  ----------------------------<  62<L>  3.6   |  17<L>  |  0.93    Ca    8.4      07 Nov 2021 07:14  Phos  3.1     11-07  Mg     2.20     11-07    TPro  6.2  /  Alb  2.9<L>  /  TBili  0.6  /  DBili  x   /  AST  20  /  ALT  21  /  AlkPhos  142<H>  11-05                RADIOLOGY & ADDITIONAL TESTS:  Results Reviewed.  Imaging Personally Reviewed.    COORDINATION OF CARE:  Care Discussed with  ACP

## 2021-11-07 NOTE — PROGRESS NOTE ADULT - ASSESSMENT
80 yo Khmer-speaking F w/ Afib on Eliquis, hypothyroidism, GERD, HTN, asthma, and squamous cell carcinoma of the lung (dx 2017, currently off chemo) with vocal cord paralysis who p/w worsening difficulty with eating x several weeks, a/w sob and failure to thrive. Cardiology called for "tachy - vu syndrome" patient with Afib RVR received metoprolol however difficult to rate control and so was given cardizem, subsequently patient temporarily vu to "40's"?.     #AF  Patient with Afib RVR up to 150s, received Cardizem 10 mg IV x 1 doses.  - continue Toprol 100mg daily, apixaban 2.5mg BID  - Continuous tele monitoring  - f/u TTE    Flavio Moreno MD, PGY-7  Interventional Cardiology Fellow  All Cardiology service information can be found 24/7 on [email or web address], password: [email or web address] 82 yo Malay-speaking F w/ Afib on Eliquis, hypothyroidism, GERD, HTN, asthma, and squamous cell carcinoma of the lung (dx 2017, currently off chemo) with vocal cord paralysis who p/w worsening difficulty with eating x several weeks, a/w sob and failure to thrive. Cardiology called for "tachy - vu syndrome" patient with Afib RVR received metoprolol however difficult to rate control and so was given cardizem, subsequently patient temporarily vu to "40's"?.     #AF  Patient with Afib RVR up to 150s, received Cardizem 10 mg IV x 1 doses.  - continue Toprol 100mg daily, apixaban 2.5mg BID  - Continuous tele monitoring, goal HR < 110  - f/u TTE    Flavio Moreno MD, PGY-7  Interventional Cardiology Fellow  All Cardiology service information can be found 24/7 on [email or web address], password: [email or web address]

## 2021-11-08 NOTE — PROGRESS NOTE ADULT - PROBLEM SELECTOR PLAN 5
- SBP: 100s here   - on metoprolol;  - BP borderline on IVF - SBP: 100s here   - metoprolol IV   - BP borderline on IVF

## 2021-11-08 NOTE — CONSULT NOTE ADULT - ASSESSMENT
81F Hx hypothyroidism, GERD, HTN,  asthma, afib w/ Eliquis, metoprolol, Cardizem, NSCLC (2017) now on 4th line chemo: Venofer/ Gemzar  (10/6) p/w worsening difficulty with eating x weeks. Palliative Medicine was consulted for complex medical decision making related to goals of care discussions    ------------------------------  # DYSPHAGIA  - Vocal cord paralysis  - On Minced and moist diet  - Keep upright. Aspiration precautions    # FAILURE TO THRIVE  - Advanced cancer + poor PO intake + debility (PPSV 60)  - A PEG would not fix all these problems which I clarified with daughter  - Goal is to make patient feel better by alleviating hunger pangs    # LUNG CANCER  - Not a candidate for DMT due to poor nutrition  - Hospice appropriate    # ADVANCE CARE PLANNING  - CODE: FULL  - HCP: Dr. Mat Perera (daughter)  - HOSPICE ELIGIBLE: Yes. Hospice referral  - GOALS:  1) Mat Olivares will meet with patient tonight and talk about code and PEG  2) Leaning towards PEG  3) Hospice aware. Pre-approved for home. Pending meeting with family.     Total face to face time discussing goals of care, advance care planning, code status and hospice = 16 mins

## 2021-11-08 NOTE — PROGRESS NOTE ADULT - ASSESSMENT
81F Hx hypothyroidism, GERD, HTN,  asthma, afib w/ Eliquis &  metoprolol + Cardizem, lung cancer s/p chemo and radiation &y immunotherapy 02/2019 but currently getting Venofer/ Gemzar last dose 10/6 p/w worsening difficulty with eating xweeks admitted for Afib w/ rvr and FTT now DNR/DNI comfort care  81F Hx hypothyroidism, GERD, HTN,  asthma, afib w/ Eliquis &  metoprolol + Cardizem, lung cancer s/p chemo and radiation &y immunotherapy 02/2019 but currently getting Venofer/ Gemzar last dose 10/6 p/w worsening difficulty with eating x weeks admitted for Afib w/ rvr and FTT now DNR/DNI.

## 2021-11-08 NOTE — PROGRESS NOTE ADULT - ASSESSMENT
82 yo Arabic-speaking F with advanced squamous lung cancer, s/p multiple lines of therapy since 2017, most recently on gemcitabine, not interested in continuing treatment, PMH of hypothyroidism, GERD, HTN,  asthma, afib w/Eliquis, with vocal cord paralysis who p/w worsening difficulty with eating x several weeks, a/w failure to thrive.  PET/CT 10/4/21 showed increased size/extent and FDG avidity of large difficult to delineate soft tissue left upper lobe/paramediastinal region extending into the perihilar and subcarinal region, concerning for progression of disease  Patient and family decline further therapy, are interested in home hospice  Unclear if PEG tube would have any meaningful benefit for this patient with metastatic lung cancer with progression of disease on recent scans, FTT and with her overall goal of comfort.   Would defer to palliative care, appreciate input regarding goals of care and feeding tube  Consider fentanyl patch for pain control as pt not able to swallow medications  Supportive care, pain control, Nutrition, PT, DVT ppx    Will follow. Please do not hesitate to call back with questions.     Nata Merino MD  Medical Oncology Attending  C: [phone]

## 2021-11-08 NOTE — CONSULT NOTE ADULT - ATTENDING COMMENTS
----- Message from Ace Yepez sent at 6/18/2020  7:33 AM CDT -----  ..Type:  Same Day Appointment Request    Caller is requesting a same day appointment.  Caller declined first available appointment listed below.    Name of Caller:pt   When is the first available appointment?  Symptoms: blood  pressure elevated // medication adjustment   Best Call Back Number [phone]  Additional Information: Pt is requesting a call from nurse to discuss he has issues with his blood pressure being elevated// medication adjustment. Pt was scheduled to have a video with Misty schulte  but pt was unable to get video appt working. Pt was then offered an appt with his pcp provider and an appt didn't come up for same day. Pt then hung up the phone pt was called back to get put through to the on call nurse for the blood pressure pt then hung up again. I wasn't able to keep the pt on the phone. Please call pt back to se if he would still like to be seen.           
Can call back and schedule appt    Ok to take restrictions off schedule, ok to put in urgent spot if available    Pricilla Ferrara MD    
Can he be seen at an alternative location?   
No available visits on schedule, no urgent slots. Please advise  
Spoke with patient wife. Patient is scheduled for tomorrow 6/19/2020 at the [facility] with Uma Hubbard for 1:20pm.      
Martha Raya is an 81 year old woman,  Czech-speaking, with history of Afib (on Eliquis), hypothyroidism, GERD, HTN,  asthma, and squamous cell carcinoma of the lung (dx 2017, currently off chemo) with vocal cord paralysis. She presented with progressive difficulty eating over past several weeks, associated with sob and failure to thrive. There has been afib with RVR, ECG showing  bpm. She received metoprolol and subsequently diltiazem followed by  transient bradycardia with HR  40’s bpm. Labs 11/6 /21 noted serum potassium 3.2 mmol/L, serum calcium 8.0 mg/dL; bicarb 19 mmol/L, serum creatinine 0.69 mg/dL.  Agree with management plan to avoid combination of beta blocker with non-dihydropyridine calcium channel blocker. Maintain: apixaban (Eliquis) 2.5 mg oral twice daily, artificial tears solution 1 drop both eyes four times daily, budesonide Inhalation Suspension 0.5 mg Inhalation daily, levothyroxine 75 mCg Oral daily, metoprolol succinate ER (Toprol XL) 100 mg oral at bedtime and pantoprazole 40 mg IV Push daily. Goal for HR is < 110 bpm. An echocardiogram is planned to assess  LV size segmental wall motion and overall function. Check thyroid panel to assess effectiveness of current levothyroxine dose.
81 year old woman with a pmhx of hypothyroidism, GERD, HTN,  asthma, and afib (on Eliquis, metoprolol, and Cardizem), NSCLC (2017) now on 4th line chemo: Venofer/ Gemzar  (10/6) who presented to the ED for worsening difficulty with eating and admitted fro FTT. Found to be in Afib with RVR with tacy-vu syndrome HR went to the 40 after Cardizem push. Discussed PPM which the patient and her daughter Christal, but the patient is deferring the decision to her daughter Dr. Ivanna Olivares. Spoke to Dr. Olivares at [phone] at 2:10pm on 11/8/2021 and will like to have a family discussion prior to making a decision. Will follow.
81 year old Arabic speaking F with squamous cell carcinoma of the lung leading to difficulty eating and failure to thrive. Patient is DNR but family wishes to consider PEG tuube placement if this will help her. As such, help from palliative care is needed to discuss GOC.

## 2021-11-08 NOTE — CONSULT NOTE ADULT - ASSESSMENT
This is a 81 year old woman with a pmhx of hypothyroidism, GERD, HTN,  asthma, and afib (on Eliquis, metoprolol, and Cardizem), NSCLC (2017) now on 4th line chemo: Venofer/ Gemzar  (10/6) who presented to the ED for worsening difficulty with eating and admitted fro FTT. Found to be in Afib with RVR with tacy-vu syndrome HR ya to the 40 after Cardizem push.     Plan:  - Continuous telemetric monitoring  - Monitor electrolytes and replete K to 4 and Mg to 2  - Continue Eliquis 2.5mg po BID for thromboembolic ppx  given that patient has a [id number] score of   - Continue metoprolol 100mg po qd  - Continue care per primary team    Patient to be staffed with attending. Please await attending addendum This is a 81 year old woman with a pmhx of hypothyroidism, GERD, HTN,  asthma, and afib (on Eliquis, metoprolol, and Cardizem), NSCLC (2017) now on 4th line chemo: Venofer/ Gemzar  (10/6) who presented to the ED for worsening difficulty with eating and admitted fro FTT. Found to be in Afib with RVR with tacy-vu syndrome HR went to the 40 after Cardizem push. Discussed PPM which the patient and her daughter Christal, but the patient is deferring the decision to her daughter Dr. Ivanna Olivares. Spoke to Dr. Olivares at [phone] at 2:10pm on 11/8/2021 and will like to have a family discussion prior to making a decision.     Plan:  - Continuous telemetric monitoring  - Monitor electrolytes and replete K to 4 and Mg to 2  - Obtain TTE  - Continue Eliquis 2.5mg po BID for thromboembolic ppx  given that patient has a [id number] score of 4 (age, sex, HTN)  - Continue metoprolol 50mg po qd and hold for SBP less then 90mmHg and HR less then 55bpm  - Continue care per primary team    Jon Keys PA-C  Patient to be staffed with attending. Please await attending addendum This is a 81 year old woman with a pmhx of hypothyroidism, GERD, HTN,  asthma, and afib (on Eliquis, metoprolol, and Cardizem), NSCLC (2017) now on 4th line chemo: Venofer/ Gemzar  (10/6) who presented to the ED for worsening difficulty with eating and admitted fro FTT. Found to be in Afib with RVR with tacy-vu syndrome HR went to the 40 after Cardizem push. Discussed PPM which the patient and her daughter Christal, but the patient is deferring the decision to her daughter Dr. Ivanna Olivares. Spoke to Dr. Olivares at [phone] at 2:10pm on 11/8/2021 and will like to have a family discussion prior to making a decision.     Plan:  - Continuous telemetric monitoring   - Monitor electrolytes and replete K to 4 and Mg to 2  - Obtain TTE  - Continue Eliquis 2.5mg po BID for thromboembolic ppx  given that patient has a [id number] score of 4 (age, sex, HTN)  - Continue home medication 50mg po qd or metoprolol 2.5 q6h prn if unable to tolerate oral medication. Hold for SBP less then 90mmHg and HR less then 55bpm  - Continue care per primary team    Jon Keys PA-C  Patient to be staffed with attending. Please await attending addendum This is a 81 year old woman with a pmhx of hypothyroidism, GERD, HTN,  asthma, and afib (on Eliquis, metoprolol, and Cardizem), NSCLC (2017) now on 4th line chemo: Venofer/ Gemzar  (10/6) who presented to the ED for worsening difficulty with eating and admitted fro FTT. Found to be in Afib with RVR with tacy-vu syndrome HR went to the 40 after Cardizem push. Discussed PPM which the patient and her daughter Christal, but the patient is deferring the decision to her daughter Dr. Ivanna Olivares. Spoke to Dr. Olivares at [phone] at 2:10pm on 11/8/2021 and will like to have a family discussion prior to making a decision.     Plan:  - Continuous telemetric monitoring   - Monitor electrolytes and replete K to 4 and Mg to 2  - Obtain TTE  - Continue Eliquis 2.5mg po BID for thromboembolic ppx  given that patient has a [id number] score of 4 (age, sex, HTN)  - Continue home medication 50mg po qd or metoprolol 2.5mg IV q6h prn if unable to tolerate oral medication. Hold for SBP less then 90mmHg and HR less then 55bpm  - Continue care per primary team    Jon Keys PA-C  Patient to be staffed with attending. Please await attending addendum This is a 81 year old woman with a pmhx of hypothyroidism, GERD, HTN,  asthma, and afib (on Eliquis, metoprolol, and Cardizem), NSCLC (2017) now on 4th line chemo: Venofer/ Gemzar  (10/6) who presented to the ED for worsening difficulty with eating and admitted fro FTT. Found to be in Afib with RVR with tacy-vu syndrome HR went to the 40 after Cardizem push. Discussed PPM which the patient and her daughter Christal, but the patient is deferring the decision to her daughter Dr. Ivanna Olivares. Spoke to Dr. Olivares at [phone] at 2:10pm on 11/8/2021 and will like to have a family discussion prior to making a decision.     Plan:  - Continuous telemetric monitoring   - Monitor electrolytes and replete K to 4 and Mg to 2  - Obtain TTE  - Continue Eliquis 2.5mg po BID for thromboembolic ppx  given that patient has a [id number] score of 4 (age, sex, HTN)  - Continue home medication 50mg po qd or metoprolol 2.5mg IV q6h prn if unable to tolerate oral medication. Hold for SBP less then 90mmHg and HR less then 55bpm  - Continue care per primary team    Jon Keys PA-C  Patient to be staffed with attending. Please await attending addendum

## 2021-11-08 NOTE — PROGRESS NOTE ADULT - SUBJECTIVE AND OBJECTIVE BOX
Patient is a 81y old  Female who presents with a chief complaint of Afib with RVR (07 Nov 2021 15:29)      SUBJECTIVE / OVERNIGHT EVENTS:  ADDITIONAL REVIEW OF SYSTEMS:    MEDICATIONS  (STANDING):  apixaban 2.5 milliGRAM(s) Oral every 12 hours  artificial  tears Solution 1 Drop(s) Both EYES four times a day  buDESOnide    Inhalation Suspension 0.5 milliGRAM(s) Inhalation daily  influenza  Vaccine (HIGH DOSE) 0.7 milliLiter(s) IntraMuscular once  levothyroxine 75 MICROGram(s) Oral daily  metoprolol succinate  milliGRAM(s) Oral at bedtime  pantoprazole  Injectable 40 milliGRAM(s) IV Push daily  sodium chloride 0.9%. 1000 milliLiter(s) (75 mL/Hr) IV Continuous <Continuous>    MEDICATIONS  (PRN):  acetaminophen     Tablet .. 650 milliGRAM(s) Oral every 6 hours PRN Mild Pain (1 - 3)  FIRST- Mouthwash  BLM 5 milliLiter(s) Swish and Spit every 6 hours PRN Mouth Care  hydrocodone/homatropine Syrup 5 milliLiter(s) Oral every 6 hours PRN Cough  metoclopramide Injectable 5 milliGRAM(s) IV Push three times a day PRN nausea  oxyCODONE    IR 5 milliGRAM(s) Oral every 6 hours PRN Moderate Pain (4 - 6)      CAPILLARY BLOOD GLUCOSE        I&O's Summary      PHYSICAL EXAM:  Vital Signs Last 24 Hrs  T(C): 36.4 (08 Nov 2021 05:00), Max: 36.8 (07 Nov 2021 22:34)  T(F): 97.5 (08 Nov 2021 05:00), Max: 98.2 (07 Nov 2021 22:34)  HR: 120 (08 Nov 2021 07:47) (111 - 140)  BP: 101/52 (08 Nov 2021 07:47) (74/38 - 114/72)  BP(mean): --  RR: 16 (08 Nov 2021 07:47) (16 - 19)  SpO2: 98% (08 Nov 2021 07:47) (98% - 100%)  CONSTITUTIONAL: NAD, well-developed, well-groomed  EYES: PERRLA; conjunctiva and sclera clear  ENMT: Moist oral mucosa, no pharyngeal injection or exudates; normal dentition  NECK: Supple, no palpable masses; no thyromegaly  RESPIRATORY: Normal respiratory effort; lungs are clear to auscultation bilaterally  CARDIOVASCULAR: Regular rate and rhythm, normal S1 and S2, no murmur/rub/gallop; No lower extremity edema; Peripheral pulses are 2+ bilaterally  ABDOMEN: Nontender to palpation, normoactive bowel sounds, no rebound/guarding; No hepatosplenomegaly  MUSCULOSKELETAL:  Normal gait; no clubbing or cyanosis of digits; no joint swelling or tenderness to palpation  PSYCH: A+O to person, place, and time; affect appropriate  NEUROLOGY: CN 2-12 are intact and symmetric; no gross sensory deficits   SKIN: No rashes; no palpable lesions    LABS:    11-07    144  |  109<H>  |  13  ----------------------------<  62<L>  3.6   |  17<L>  |  0.93    Ca    8.4      07 Nov 2021 07:14  Phos  3.1     11-07  Mg     2.20     11-07                  RADIOLOGY & ADDITIONAL TESTS:  Results Reviewed:   Imaging Personally Reviewed:  Electrocardiogram Personally Reviewed:    COORDINATION OF CARE:  Care Discussed with Consultants/Other Providers [Y/N]:  Prior or Outpatient Records Reviewed [Y/N]:   Patient is a 81y old  Female who presents with a chief complaint of Afib with RVR (07 Nov 2021 15:29)      SUBJECTIVE / OVERNIGHT EVENTS: Tele HR in 130's. States she is nauseous with PO intake. did not receive PO metoprolol spoken to with Marilynn Vazquez [id number]  ADDITIONAL REVIEW OF SYSTEMS: denies palpitation/N/V/D     MEDICATIONS  (STANDING):  apixaban 2.5 milliGRAM(s) Oral every 12 hours  artificial  tears Solution 1 Drop(s) Both EYES four times a day  buDESOnide    Inhalation Suspension 0.5 milliGRAM(s) Inhalation daily  influenza  Vaccine (HIGH DOSE) 0.7 milliLiter(s) IntraMuscular once  levothyroxine 75 MICROGram(s) Oral daily  metoprolol succinate  milliGRAM(s) Oral at bedtime  pantoprazole  Injectable 40 milliGRAM(s) IV Push daily  sodium chloride 0.9%. 1000 milliLiter(s) (75 mL/Hr) IV Continuous <Continuous>    MEDICATIONS  (PRN):  acetaminophen     Tablet .. 650 milliGRAM(s) Oral every 6 hours PRN Mild Pain (1 - 3)  FIRST- Mouthwash  BLM 5 milliLiter(s) Swish and Spit every 6 hours PRN Mouth Care  hydrocodone/homatropine Syrup 5 milliLiter(s) Oral every 6 hours PRN Cough  metoclopramide Injectable 5 milliGRAM(s) IV Push three times a day PRN nausea  oxyCODONE    IR 5 milliGRAM(s) Oral every 6 hours PRN Moderate Pain (4 - 6)      CAPILLARY BLOOD GLUCOSE        I&O's Summary      PHYSICAL EXAM:  Vital Signs Last 24 Hrs  T(C): 36.4 (08 Nov 2021 05:00), Max: 36.8 (07 Nov 2021 22:34)  T(F): 97.5 (08 Nov 2021 05:00), Max: 98.2 (07 Nov 2021 22:34)  HR: 120 (08 Nov 2021 07:47) (111 - 140)  BP: 101/52 (08 Nov 2021 07:47) (74/38 - 114/72)  BP(mean): --  RR: 16 (08 Nov 2021 07:47) (16 - 19)  SpO2: 98% (08 Nov 2021 07:47) (98% - 100%)    CONSTITUTIONAL: NAD elderly female   EYES: PERRLA; conjunctiva and sclera clear  ENMT: Moist oral mucosa, no pharyngeal injection or exudates; normal dentition  NECK: Supple, no palpable masses; no thyromegaly  RESPIRATORY: Normal respiratory effort; lungs are clear to auscultation bilaterally  CARDIOVASCULAR: s1/s2 irregular irregular  ABDOMEN: Nontender to palpation, normoactive bowel sounds, no rebound/guarding; No hepatosplenomegaly  MUSCULOSKELETAL: no joint swelling or tenderness to palpation  PSYCH: A+O to person, place, and time; affect appropriate         LABS:    11-07    144  |  109<H>  |  13  ----------------------------<  62<L>  3.6   |  17<L>  |  0.93    Ca    8.4      07 Nov 2021 07:14  Phos  3.1     11-07  Mg     2.20     11-07                  RADIOLOGY & ADDITIONAL TESTS:  Results Reviewed:   Imaging Personally Reviewed:  Electrocardiogram Personally Reviewed:    COORDINATION OF CARE:  Care Discussed with Consultants/Other Providers [Y/N]:  Prior or Outpatient Records Reviewed [Y/N]:

## 2021-11-08 NOTE — PROGRESS NOTE ADULT - PROBLEM SELECTOR PLAN 3
- NSCLC on 4th line chemotherapy   - unable to really take much PO, soft foods and soup with abd pain and pressure   - Now DNR/DNI no pressors, comfort care, palliative and hospice consult  - oncology consult  for prognosis

## 2021-11-08 NOTE — PROGRESS NOTE ADULT - SUBJECTIVE AND OBJECTIVE BOX
INTERVAL HPI/OVERNIGHT EVENTS:  Patient seen at bedside.  Appears frail and weak    VITAL SIGNS:  T(F): 97.5 (11-08-21 @ 05:00)  HR: 136 (11-08-21 @ 11:57)  BP: 112/76 (11-08-21 @ 11:57)  RR: 16 (11-08-21 @ 11:57)  SpO2: 98% (11-08-21 @ 11:57)  Wt(kg): --    PHYSICAL EXAM:    Constitutional: NAD, resting in bed comfortably  Eyes: EOMI, sclera non-icteric  Neck: supple, no LAP  Respiratory: CTA b/l, good air entry b/l, no wheezing, rhonchi or crackels  Cardiovascular: RRR, normal S1S2, no M/R/G  Gastrointestinal: soft, NTND  Extremities: no edema  Neurological: AAOx3, non focal  Skin: Normal temperature    MEDICATIONS  (STANDING):  apixaban 2.5 milliGRAM(s) Oral every 12 hours  artificial  tears Solution 1 Drop(s) Both EYES four times a day  buDESOnide    Inhalation Suspension 0.5 milliGRAM(s) Inhalation daily  influenza  Vaccine (HIGH DOSE) 0.7 milliLiter(s) IntraMuscular once  levothyroxine 75 MICROGram(s) Oral daily  metoprolol succinate  milliGRAM(s) Oral at bedtime  pantoprazole  Injectable 40 milliGRAM(s) IV Push daily  sodium chloride 0.9%. 1000 milliLiter(s) (100 mL/Hr) IV Continuous <Continuous>    MEDICATIONS  (PRN):  acetaminophen     Tablet .. 650 milliGRAM(s) Oral every 6 hours PRN Mild Pain (1 - 3)  FIRST- Mouthwash  BLM 5 milliLiter(s) Swish and Spit every 6 hours PRN Mouth Care  hydrocodone/homatropine Syrup 5 milliLiter(s) Oral every 6 hours PRN Cough  metoclopramide Injectable 5 milliGRAM(s) IV Push three times a day PRN nausea  oxyCODONE    IR 5 milliGRAM(s) Oral every 6 hours PRN Moderate Pain (4 - 6)      Allergies    No Known Allergies    Intolerances        LABS:    11-07    144  |  109<H>  |  13  ----------------------------<  62<L>  3.6   |  17<L>  |  0.93    Ca    8.4      07 Nov 2021 07:14  Phos  3.1     11-07  Mg     2.20     11-07            RADIOLOGY & ADDITIONAL TESTS:  Studies reviewed.

## 2021-11-08 NOTE — CONSULT NOTE ADULT - SUBJECTIVE AND OBJECTIVE BOX
Source: patient and Chart    HPI:  This is a 81 year old woman with a pmhx of hypothyroidism, GERD, HTN,  asthma, and afib (on Eliquis, metoprolol, and Cardizem), NSCLC (2017) now on 4th line chemo: Venofer/ Gemzar  (10/6) who presented to the ED for worsening difficulty with eating .    Patient was unable to eat much. She stated that when she eats she feel like the food is going down the wrong way. She also reported several episodes of coughing when eating and n/v. She has been taking Eliquis crushed along with her other meds. She admitted to chronic abdominal pain which she rated a 6/10 in terms on intensity. She also had pressing chest pain at least since her last chemo. Patient denied fever, chills, diaphoresis, HA, lightheadedness, dizziness, changes in visions, cold like symptoms  (sore throat cough, conjunctivitis runny nose), CP, palpitation, hematuria, melena, hematochezia numbness and tingling    ED course was significant for T= 36.4C, HR 50BPM, /53mmHg, RR 16 with a spo2 100% on RA. Labs were significant for WBC 10.656, Hgb 11.2, Hct 34.9, , Na 142, K 2.77, BUN 8, sCr 0.73, Alk phos 142, and covid-19 negative. Patient was admitted for failure to thrive. Heme/onc was consulted given  NSCLC and recommended calling palliative care for GOC discussion. Patient is DNR/DNI and comfort care.  Cardiology was consulted for "tachy - vu syndrome" patient with Afib RVR received metoprolol however difficult to rate control s/p Cardizem IV which subsequently causes the patient to be temporarily bradycardiac to "40's"?. Cardiology was recommended to stop Cardizem and recommended giving Lopressor IV PRN instead to minimize bradycardic episodes.       PAST MEDICAL & SURGICAL HISTORY:  Hypertension  Asthma  GERD (gastroesophageal reflux disease)  Hypothyroidism  Other nonspecific abnormal finding of lung field    Lung cancerleft upper lobe - received CHEMO RT and immunotherapy    Paralysis of vocal cords    Atrial fibrillationdx.  3/29/21, to start anticoagulation post procedure per cardiology per pt    History of lung [id number]    H/O colonoscopy    H/O vocal cord paralysisfilter injection 2018    History of laryngoscopythyroplasty with manning implant pharyngoplasty          MEDICATIONS  (STANDING):  apixaban 2.5 milliGRAM(s) Oral every 12 hours  artificial  tears Solution 1 Drop(s) Both EYES four times a day  buDESOnide    Inhalation Suspension 0.5 milliGRAM(s) Inhalation daily  influenza  Vaccine (HIGH DOSE) 0.7 milliLiter(s) IntraMuscular once  levothyroxine 75 MICROGram(s) Oral daily  metoprolol succinate  milliGRAM(s) Oral at bedtime  pantoprazole  Injectable 40 milliGRAM(s) IV Push daily  sodium chloride 0.9%. 1000 milliLiter(s) (100 mL/Hr) IV Continuous <Continuous>    MEDICATIONS  (PRN):  acetaminophen     Tablet .. 650 milliGRAM(s) Oral every 6 hours PRN Mild Pain (1 - 3)  FIRST- Mouthwash  BLM 5 milliLiter(s) Swish and Spit every 6 hours PRN Mouth Care  hydrocodone/homatropine Syrup 5 milliLiter(s) Oral every 6 hours PRN Cough  metoclopramide Injectable 5 milliGRAM(s) IV Push three times a day PRN nausea  oxyCODONE    IR 5 milliGRAM(s) Oral every 6 hours PRN Moderate Pain (4 - 6)      FAMILY HISTORY:  Family history of cancer (Sibling)    SOCIAL HISTORY:  CIGARETTES: Denied  ALCOHOL: denied   ILLICIT DRUG USES: denied    REVIEW OF SYSTEMS:  CONSTITUTIONAL: No fever, weight loss, chills, shakes, or fatigue  RESPIRATORYper HPI  CARDIOVASCULAR: per HPI  GASTROINTESTINAL: No abdominal  or epigastric pain, nausea, vomiting, hematemesis, diarrhea, constipation, melena or bright red blood.  GENITOURINARY: No dysuria, nocturia, hematuria, or urinary incontinence  NEUROLOGICAL: No headaches, memory loss, slurred speech, limb weakness, loss of strength, numbness, or tremors  MUSCULOSKELETAL: No joint pain or swelling, muscle, back, or extremity pain        Vital Signs Last 24 Hrs  T(C): 36.4 (08 Nov 2021 05:00), Max: 36.8 (07 Nov 2021 22:34)  T(F): 97.5 (08 Nov 2021 05:00), Max: 98.2 (07 Nov 2021 22:34)  HR: 120 (08 Nov 2021 07:47) (111 - 140)  BP: 100/58 (08 Nov 2021 10:44) (74/38 - 114/72)  BP(mean): --  RR: 16 (08 Nov 2021 07:47) (16 - 19)  SpO2: 98% (08 Nov 2021 07:47) (98% - 100%)    PHYSICAL EXAM:  GENERAL: Well appearing, speaking in full sentence, in NAD  HEART: S1S2  irregularly irregular; No murmur appreciated .  PULMONARY:CTABL, normal respiratory effort.   ABDOMEN: Bowel sounds present, soft, NDNT  EXTREMITIES:  Warm, well -perfused, no pedal edema, distal pulses present  NEUROLOGICAL:AOx3     INTERPRETATION OF TELEMETRY: Afib with -130s    ECG:    I&O's Detail      LABS:    11-07    144  |  109<H>  |  13  ----------------------------<  62<L>  3.6   |  17<L>  |  0.93    Ca    8.4      07 Nov 2021 07:14  Phos  3.1     11-07  Mg     2.20     11-07              BNP  I&O's Detail    Daily     Daily     RADIOLOGY & ADDITIONAL STUDIES:       Source: patient and Chart  Latvian  George [phone] using languages line  Daughter Christal Olivares was also used as a  per patient request in the being of the interview.    HPI:  This is a 81 year old woman with a pmhx of hypothyroidism, GERD, HTN,  asthma, and afib (on Eliquis, metoprolol, and Cardizem), NSCLC (2017) now on 4th line chemo: Venofer/ Gemzar  (10/6) who presented to the ED for worsening difficulty with eating after having "stomach virus" 2 weeks ago.    Patient was unable to eat much. She stated that when she eats she feel like the food is going down the wrong way. She also reported several episodes of coughing with SOB when eating and n/v. She has been taking Eliquis crushed along with her other meds. She admitted to chronic abdominal pain which she rated a 6/10 in terms on intensity. Patient also admitted to chronic dizziness x 1 month 2/2 weakness due to  loss of appetite for the past 4months. Patient denied fever, chills, diaphoresis, HA, changes in visions, cold like symptoms  (sore throat cough, conjunctivitis, runny nose), CP, palpitation, hematuria, melena, hematochezia numbness and tingling.    ED course was significant for T= 36.4C, HR 50BPM, /53mmHg, RR 16 with a spo2 100% on RA. Labs were significant for WBC 10.656, Hgb 11.2, Hct 34.9, , Na 142, K 2.77, BUN 8, sCr 0.73, Alk phos 142, and covid-19 negative. Patient was admitted for failure to thrive. Heme/onc was consulted given  NSCLC and recommended calling palliative care for GOC discussion. Patient is DNR/DNI and comfort care?.  Cardiology was consulted for "tachy - vu syndrome" patient with Afib RVR received metoprolol however difficult to rate control s/p Cardizem IV which subsequently causes the patient to be temporarily bradycardiac to "40's"?. Patient stated that after she received the medication she felt like she was going to pass out.  Cardiology was recommended to stop Cardizem and recommended giving Lopressor IV PRN instead to minimize bradycardic episodes. According to the daughter, her mother BP was always low when she takes metoprolol 100 xl and that she would become dizzy which is why her mother now takes metoprolol 50mg qd at home. Upon telemetric, patient is AFib with RVR -120 mostly with brief episode of HR in the 170s. Patient had no bradycardiac events for the past 48 hours. Patient also had not been getting her metoprolol due to hypotension. Discussed PPM which the patient and her daughter Christal, but the patient is deferring the decision to her daughter Dr. Ivanna Olivares. Spoke to Dr. Olivares at [phone] at 2:10pm on 11/8/2021 and will like to have a family discussion prior to making a decision.     PAST MEDICAL & SURGICAL HISTORY:  Hypertension  Asthma  GERD (gastroesophageal reflux disease)  Hypothyroidism  Other nonspecific abnormal finding of lung field  Lung cancerleft upper lobe - received CHEMO RT and immunotherapy  Paralysis of vocal cords  Atrial fibrillationdx.  3/29/21, to start anticoagulation post procedure per cardiology per pt  History of lung [id number]  H/O colonoscopy  H/O vocal cord paralysisfilter injection 2018  History of laryngoscopythyroplasty with manning implant pharyngoplasty    MEDICATIONS  (STANDING):  apixaban 2.5 milliGRAM(s) Oral every 12 hours  artificial  tears Solution 1 Drop(s) Both EYES four times a day  buDESOnide    Inhalation Suspension 0.5 milliGRAM(s) Inhalation daily  influenza  Vaccine (HIGH DOSE) 0.7 milliLiter(s) IntraMuscular once  levothyroxine 75 MICROGram(s) Oral daily  metoprolol succinate  milliGRAM(s) Oral at bedtime  pantoprazole  Injectable 40 milliGRAM(s) IV Push daily  sodium chloride 0.9%. 1000 milliLiter(s) (100 mL/Hr) IV Continuous <Continuous>    MEDICATIONS  (PRN):  acetaminophen     Tablet .. 650 milliGRAM(s) Oral every 6 hours PRN Mild Pain (1 - 3)  FIRST- Mouthwash  BLM 5 milliLiter(s) Swish and Spit every 6 hours PRN Mouth Care  hydrocodone/homatropine Syrup 5 milliLiter(s) Oral every 6 hours PRN Cough  metoclopramide Injectable 5 milliGRAM(s) IV Push three times a day PRN nausea  oxyCODONE    IR 5 milliGRAM(s) Oral every 6 hours PRN Moderate Pain (4 - 6)      FAMILY HISTORY:  Uncle hx of colon cancer    SOCIAL HISTORY:  Lives with her  who she is fully dependent on   CIGARETTES: Denied  ALCOHOL: denied   ILLICIT DRUG USES: denied    REVIEW OF SYSTEMS:  CONSTITUTIONAL: No fever, weight loss, chills, shakes, or fatigue  RESPIRATORYper HPI  CARDIOVASCULAR: per HPI  GASTROINTESTINAL: No abdominal  or epigastric pain, nausea, vomiting, hematemesis, diarrhea, constipation, melena or bright red blood.  GENITOURINARY: No dysuria, nocturia, hematuria, or urinary incontinence  NEUROLOGICAL: No headaches, memory loss, slurred speech, limb weakness, loss of strength, numbness, or tremors  MUSCULOSKELETAL: No joint pain or swelling, muscle, back, or extremity pain    Vital Signs Last 24 Hrs  T(C): 36.4 (08 Nov 2021 05:00), Max: 36.8 (07 Nov 2021 22:34)  T(F): 97.5 (08 Nov 2021 05:00), Max: 98.2 (07 Nov 2021 22:34)  HR: 120 (08 Nov 2021 07:47) (111 - 140)  BP: 100/58 (08 Nov 2021 10:44) (74/38 - 114/72)  BP(mean): --  RR: 16 (08 Nov 2021 07:47) (16 - 19)  SpO2: 98% (08 Nov 2021 07:47) (98% - 100%)    PHYSICAL EXAM:  GENERAL: Well appearing, speaking in full sentence, in NAD  HEART: S1S2  irregularly irregular; No murmur appreciated .  PULMONARY:CTABL, normal respiratory effort.   ABDOMEN: Bowel sounds present, soft, NDNT  EXTREMITIES:  Warm, well -perfused, no pedal edema, distal pulses present  NEUROLOGICAL:AOx3     INTERPRETATION OF TELEMETRY: Afib with -120s with burst in the 170s    ECG: Afib normal axis  PVCs    I&O's Detail      LABS:    11-07    144  |  109<H>  |  13  ----------------------------<  62<L>  3.6   |  17<L>  |  0.93    Ca    8.4      07 Nov 2021 07:14  Phos  3.1     11-07  Mg     2.20     11-07      RADIOLOGY & ADDITIONAL STUDIES:  < from: Xray Chest 1 View- PORTABLE-Urgent (11.05.21 @ 17:28) >  IMPRESSION:  Left upper lobe mass with associated obstructing atelectasis has increased with trace to small left effusion.      < from: CT Abdomen and Pelvis No Cont (11.05.21 @ 18:01) >  IMPRESSION:  Limited evaluation secondary to lack of IV and oral contrast.  No bowel obstruction.  Questionable thickening versus underdistention of the ascending, transverse and proximal descending colon which may represent colitis.         Source: patient and Chart  Telugu  George [phone] using languages line  Daughter Christal Olivares was also used as a  per patient request in the being of the interview.    HPI:  This is a 81 year old woman with a pmhx of hypothyroidism, GERD, HTN,  asthma, and afib (on Eliquis, metoprolol, and Cardizem), NSCLC (2017) now on 4th line chemo: Venofer/ Gemzar  (10/6) who presented to the ED for worsening difficulty with eating after having "stomach virus" 2 weeks ago.    Patient was unable to eat much. She stated that when she eats she feel like the food is going down the wrong way. She also reported several episodes of coughing with SOB when eating and n/v. She has been taking Eliquis crushed along with her other meds. She admitted to chronic abdominal pain which she rated a 6/10 in terms on intensity. Patient also admitted to chronic dizziness x 1 month 2/2 weakness due to  loss of appetite for the past 4months. Patient denied fever, chills, diaphoresis, HA, changes in visions, cold like symptoms  (sore throat cough, conjunctivitis, runny nose), CP, palpitation, hematuria, melena, hematochezia numbness and tingling.    ED course was significant for T= 36.4C, HR 50BPM, /53mmHg, RR 16 with a spo2 100% on RA. Labs were significant for WBC 10.656, Hgb 11.2, Hct 34.9, , Na 142, K 2.77, BUN 8, sCr 0.73, Alk phos 142, and covid-19 negative. Patient was admitted for failure to thrive. Heme/onc was consulted given  NSCLC and recommended calling palliative care for GOC discussion. Patient is DNR/DNI and comfort care?.  Cardiology was consulted for "tachy - vu syndrome" patient with Afib RVR received metoprolol however difficult to rate control s/p Cardizem IV which subsequently causes the patient to be temporarily bradycardiac to "40's"?. Patient stated that after she received the medication she felt like she was going to pass out.  Cardiology was recommended to stop Cardizem and recommended giving Lopressor IV PRN instead to minimize bradycardic episodes. According to the daughter, her mother BP was always low when she takes metoprolol 100 xl and that she would become dizzy which is why her mother now takes metoprolol 50mg qd at home. Upon telemetric, patient is AFib with RVR -120 mostly with brief episode of HR in the 170s. Patient had no bradycardiac events for the past 48 hours. Patient also had not been getting her metoprolol due to hypotension. Discussed PPM which the patient and her daughter Christal, but the patient is deferring the decision to her daughter Dr. Ivanna Olivares. Spoke to Dr. Olivares at [phone] at 2:10pm on 11/8/2021 and will like to have a family discussion prior to making a decision.     PAST MEDICAL & SURGICAL HISTORY:  Hypertension  Asthma  GERD (gastroesophageal reflux disease)  Hypothyroidism  Other nonspecific abnormal finding of lung field  Lung cancerleft upper lobe - received CHEMO RT and immunotherapy  Paralysis of vocal cords  Atrial fibrillationdx.  3/29/21, to start anticoagulation post procedure per cardiology per pt  History of lung [id number]  H/O colonoscopy  H/O vocal cord paralysisfilter injection 2018  History of laryngoscopythyroplasty with manning implant pharyngoplasty    MEDICATIONS  (STANDING):  apixaban 2.5 milliGRAM(s) Oral every 12 hours  artificial  tears Solution 1 Drop(s) Both EYES four times a day  buDESOnide    Inhalation Suspension 0.5 milliGRAM(s) Inhalation daily  influenza  Vaccine (HIGH DOSE) 0.7 milliLiter(s) IntraMuscular once  levothyroxine 75 MICROGram(s) Oral daily  metoprolol succinate  milliGRAM(s) Oral at bedtime  pantoprazole  Injectable 40 milliGRAM(s) IV Push daily  sodium chloride 0.9%. 1000 milliLiter(s) (100 mL/Hr) IV Continuous <Continuous>    MEDICATIONS  (PRN):  acetaminophen     Tablet .. 650 milliGRAM(s) Oral every 6 hours PRN Mild Pain (1 - 3)  FIRST- Mouthwash  BLM 5 milliLiter(s) Swish and Spit every 6 hours PRN Mouth Care  hydrocodone/homatropine Syrup 5 milliLiter(s) Oral every 6 hours PRN Cough  metoclopramide Injectable 5 milliGRAM(s) IV Push three times a day PRN nausea  oxyCODONE    IR 5 milliGRAM(s) Oral every 6 hours PRN Moderate Pain (4 - 6)      FAMILY HISTORY:  Uncle hx of colon cancer    SOCIAL HISTORY:  Lives with her  who she is fully dependent on   CIGARETTES: Denied  ALCOHOL: denied   ILLICIT DRUG USES: denied    REVIEW OF SYSTEMS:  CONSTITUTIONAL: No fever, weight loss, chills, shakes, or fatigue  RESPIRATORYper HPI  CARDIOVASCULAR: per HPI  GASTROINTESTINAL: No abdominal  or epigastric pain, nausea, vomiting, hematemesis, diarrhea, constipation, melena or bright red blood.  GENITOURINARY: No dysuria, nocturia, hematuria, or urinary incontinence  NEUROLOGICAL: No headaches, memory loss, slurred speech, limb weakness, loss of strength, numbness, or tremors  MUSCULOSKELETAL: No joint pain or swelling, muscle, back, or extremity pain    Vital Signs Last 24 Hrs  T(C): 36.4 (08 Nov 2021 05:00), Max: 36.8 (07 Nov 2021 22:34)  T(F): 97.5 (08 Nov 2021 05:00), Max: 98.2 (07 Nov 2021 22:34)  HR: 120 (08 Nov 2021 07:47) (111 - 140)  BP: 100/58 (08 Nov 2021 10:44) (74/38 - 114/72)  BP(mean): --  RR: 16 (08 Nov 2021 07:47) (16 - 19)  SpO2: 98% (08 Nov 2021 07:47) (98% - 100%)    PHYSICAL EXAM:  GENERAL: Well appearing, speaking in full sentence, in NAD  HEART: S1S2  irregularly irregular; No murmur appreciated  PULMONARY:CTABL, normal respiratory effort.   ABDOMEN: Bowel sounds present, soft, NDNT  EXTREMITIES:  Warm, well -perfused, no pedal edema, distal pulses present  NEUROLOGICAL:AOx3     INTERPRETATION OF TELEMETRY: Afib with -120s with burst in the 170s    ECG: Afib normal axis  PVCs    I&O's Detail      LABS:    11-07    144  |  109<H>  |  13  ----------------------------<  62<L>  3.6   |  17<L>  |  0.93    Ca    8.4      07 Nov 2021 07:14  Phos  3.1     11-07  Mg     2.20     11-07      RADIOLOGY & ADDITIONAL STUDIES:  < from: Xray Chest 1 View- PORTABLE-Urgent (11.05.21 @ 17:28) >  IMPRESSION:  Left upper lobe mass with associated obstructing atelectasis has increased with trace to small left effusion.      < from: CT Abdomen and Pelvis No Cont (11.05.21 @ 18:01) >  IMPRESSION:  Limited evaluation secondary to lack of IV and oral contrast.  No bowel obstruction.  Questionable thickening versus underdistention of the ascending, transverse and proximal descending colon which may represent colitis.

## 2021-11-08 NOTE — CHART NOTE - NSCHARTNOTEFT_GEN_A_CORE
ACP MEDICINE COVERAGE - Medicine Subsequent Hospital Care Note    CC:  HPI/Subjective:    ROS:  Denies fever, chills, diaphoresis, malaise, night sweats, generalized weakness, headache, changes in vision, dizziness, cough, sputum production, wheezing, hemoptysis, chest pain, palpitations, shortness of breath, dyspnea on exertion, PND, dysphagia, new abdominal pain, nausea, vomiting, diarrhea, constipation, melena, hematochezia, dysuria, increased urinary frequency/urgency, hematuria, nocturia, numbness/weakness/tingling, any other complaints.    [  ] I spoke with the attending, nurse, and family to obtain the history.  [  ] Unable to obtain history due to ___________.    Vital Signs Last 24 Hrs  T(C): 36.7 (11-08-21 @ 16:39), Max: 36.8 (11-07-21 @ 22:34)  T(F): 98 (11-08-21 @ 16:39), Max: 98.2 (11-07-21 @ 22:34)  HR: 122 (11-08-21 @ 17:28) (111 - 136)  BP: 92/46 (11-08-21 @ 17:28) (74/38 - 112/76)  RR: 16 (11-08-21 @ 16:39) (16 - 19)  SpO2: 98% (11-08-21 @ 16:39) (98% - 99%) on (O2)    PHYSICAL EXAM:  Constitutional: NAD, well-developed, well-nourished  Ears, nose, Mouth, and Throat: normal external ears and nose, normal hearing, moist oral mucosa  Eyes: normal conjunctiva, EOMI, PEERL  Neck: supple, no JVD  Respiratory: Clear to auscultation bilaterally. No wheezes, rales or rhonchi. Normal respiratory effort  Cardiovascular: regular rate and rhythm, S1 and S2, no murmurs, rubs or gallops, no edema, 2+ peripheral pulses  Gastrointestinal: soft, nontender, nondistended, +bowel sounds, no hernia  Skin: warm, dry, no rash  Neurologic: sensation grossly intact, CN grossly intact, non-focal exam  Musculoskeletal: no clubbing, no cyanosis, no joint swelling  Psychiatric: A&Ox3, appropriate mood, affect    LABS:                        11.9   19.99 )-----------( 257      ( 08 Nov 2021 14:17 )             37.4     11-08    144  |  112<H>  |  25<H>  ----------------------------<  106<H>  3.7   |  15<L>  |  1.31<H>    Ca    8.2<L>      08 Nov 2021 14:17  Phos  4.4     11-08  Mg     2.30     11-08    TPro  5.5<L>  /  Alb  2.5<L>  /  TBili  0.5  /  DBili  x   /  AST  19  /  ALT  16  /  AlkPhos  108  11-08      CARDIAC ENZYMES            Serum Pro-Brain Natriuretic Peptide:   D-Dimer Assay:     Urinanalysis Basic (11-08-21 @ 19:45):      Blood Gas Venous (11-08-21 @ 19:45):  pH: 7.40 | HCO3: 28 | pCO2: 45 | pO2: 34 | Lactate: 3.4      Blood Gas Arterial (11-08-21 @ 19:45):      CAPILLARY BLOOD GLUCOSE:      COVID PCR:      RADIOLOGY:    MEDICATIONS  (STANDING):  apixaban 2.5 milliGRAM(s) Oral every 12 hours  artificial  tears Solution 1 Drop(s) Both EYES four times a day  buDESOnide    Inhalation Suspension 0.5 milliGRAM(s) Inhalation daily  influenza  Vaccine (HIGH DOSE) 0.7 milliLiter(s) IntraMuscular once  levothyroxine 75 MICROGram(s) Oral daily  metoprolol tartrate Injectable 5 milliGRAM(s) IV Push every 6 hours  pantoprazole  Injectable 40 milliGRAM(s) IV Push daily  sodium chloride 0.9%. 1000 milliLiter(s) (100 mL/Hr) IV Continuous <Continuous>    MEDICATIONS  (PRN):  acetaminophen     Tablet .. 650 milliGRAM(s) Oral every 6 hours PRN Mild Pain (1 - 3)  FIRST- Mouthwash  BLM 5 milliLiter(s) Swish and Spit every 6 hours PRN Mouth Care  hydrocodone/homatropine Syrup 5 milliLiter(s) Oral every 6 hours PRN Cough  metoclopramide Injectable 5 milliGRAM(s) IV Push three times a day PRN nausea  oxyCODONE    IR 5 milliGRAM(s) Oral every 6 hours PRN Moderate Pain (4 - 6)    I&O's Summary    I reviewed the above labs, radiology, medications, tests, telemetry, and EKG interpretation.    ASSESSMENT/PLAN:    - Clinical findings, labs, tests, telemetry, and ekg reviewed with attending. Will monitor patient closely.       Low complexity/risk (30min)  bp in the 80s  pt mentating well daughter at bedside pt complains of dizziness will hold off on lopressor for now will monitor HR closely   ivf bolus 1 liter of 2 hours   will monitor vs q 4 hours   continue telemetry   d/w medical attending Dr. Boswell

## 2021-11-08 NOTE — PROGRESS NOTE ADULT - PROBLEM SELECTOR PLAN 2
- NSCLC on 4th line chemotherapy   - unable to really take much PO, soft foods and soup with abd pain and pressure   - Now DNR/DNI no pressors, comfort care, palliative and hospice consult   - first blm mouthwash and hicodine for cough  - NS @75 ml/h  - family considering possibility of PEG for comfort will consult palliative care as per Heme Onc recs - NSCLC on 4th line chemotherapy   - unable to really take much PO, soft foods and soup with abd pain and pressure   - Now DNR/DNI no pressors, comfort care, palliative and hospice consult   - first blm mouthwash and hicodine for cough  - NS @ 100 ml/h  - family considering possibility of PEG for comfort and med administration will consult palliative care as per Heme Onc recs

## 2021-11-08 NOTE — CONSULT NOTE ADULT - SUBJECTIVE AND OBJECTIVE BOX
HPI:  81F Hx hypothyroidism, GERD, HTN,  asthma, afib w/ Eliquis, metoprolol, Cardizem, NSCLC (2017) now on 4th line chemo: Venofer/ Gemzar  (10/6) p/w worsening difficulty with eating x weeks. Palliative Medicine was consulted for complex medical decision making related to goals of care discussions    =======================================================  11/8/2021    - Chart reviewed. Patient seen and examined.  - Met w/ patient with  (ID [id number]). Patient feels weak, hungry. Says she wants to have a PEG placed right off the bat when I met with her. I met with her daughter in the room when she visited but she declined to talk about GOC. She asked me to speak with her sister, Dr. Elle Davalos for these conversations. I called Dr. Davalos and discussed GOC:    1) She understood the condition is terminal  2) Discussed code status and GOC note from earlier in the admission. Dr. Davalos said she is not aware of any discussion like this and wanted the patient to be full code for now. She fears the translation for that discussion was not done well. She will keep me posted eliza  3) Discussed home with hospice at length. Answered all her q's and 'cs. Dr. Davalos agreed with a hospice referral  4) Talked about PEG tube. Family wants it to alleviate hunger pangs. Patient feels hunger all the time. Wants it for med administration as well as the patient has a hard time taking even crushed meds in sauce.   5) Gave options:  --- Liberalize diet + meds for comfort  --- PEG tube  6) Dr. Davalos will talk to patient about code and PEG tonight. I will ff up eliza    =======================================================  ----- SYMPTOM ASSESSMENT:   [ ]Unable to obtain due to poor mentation: information obtained from team/ contact    PAIN ASSESSMENT: DENIED  Site-   Onset-   Character-   Radiation-   Associated symptoms-   Timing and duration-   Exacerbating factors  Severity-     Effect on QOL- SIGNIFICANTLY INTERFERES WITH ADL'S  PAIN AD Score: 0    Dyspnea:  Yes [ ] No [X ] - [ ]Mild [ ]Moderate [ ]Severe  Anxiety:    Yes [ ] No [ X] - [ ]Mild [ ]Moderate [ ]Severe  Fatigue:    Yes [ ] No [X ] - [ ]Mild [ ]Moderate [ ]Severe  Nausea:    Yes [ ] No [ X] - [ ]Mild [ ]Moderate [ ]Severe                         Loss of appetite: Yes [ ] No [X ] - [ ]Mild [ ]Moderate [ ]Severe             Constipation:  Yes [ ] No [X ] - [ ]Mild [ ]Moderate [ ]Severe  Grief: Yes [ ] No [X ]     [X ]All other review of systems negative, including: weakness, cough, colds, blurry vision, headaches, dysuria, pruritus, palpitations, muscle cramps, easy bruising, epistaxis, rashes    =======================================================  ----- PERTINENT PMH/ SXH/ FHX  Hypertension    Asthma    GERD (gastroesophageal reflux disease)    Hypothyroidism    Other nonspecific abnormal finding of lung field    Lung cancer    Paralysis of vocal cords    Atrial flutter    Atrial fibrillation      No significant past surgical history    History of lung biopsy    H/O colonoscopy    H/O vocal cord paralysis    History of laryngoscopy      FAMILY HISTORY:  Family history of cancer (Sibling)        ----- SOCIAL HISTORY:   [ ] Unable to elicit  Significant other/partner:    Children:   Yazdanism/Spirituality:  Substance hx: Yes[ ]  No [ ]   Tobacco hx:  Yes [ ] No [ ]   Alcohol hx: Yes [ ] No [ ]   Home Opioid hx:  [ ] I-Stop Reference No:  Living Situation: [ ]Home  [ ]Long term care  [ ]Rehab [ ]Other    ----- ADVANCE DIRECTIVES:    DNR  MOLST  [ ]  Living Will  [ ]   DECISION MAKER(s):  [ ] Health Care Proxy(s)  [ ] Surrogate(s)  [ ] Guardian           Name(s): Phone Number(s):  DR. KARLO DAVALOS (DAUGHTER) @ [phone]    ----- BASELINE (I)ADL(s) (prior to admission):  Sanders: [ ]Total  [ ] Moderate [ ]Dependent  Palliative Performance Status Version 2:   60     =======================================================  -----MEDICATIONS AND ALLERGIES:  Allergies    No Known Allergies    Intolerances    MEDICATIONS  (STANDING):  apixaban 2.5 milliGRAM(s) Oral every 12 hours  artificial  tears Solution 1 Drop(s) Both EYES four times a day  buDESOnide    Inhalation Suspension 0.5 milliGRAM(s) Inhalation daily  influenza  Vaccine (HIGH DOSE) 0.7 milliLiter(s) IntraMuscular once  levothyroxine 75 MICROGram(s) Oral daily  metoprolol tartrate Injectable 5 milliGRAM(s) IV Push every 6 hours  pantoprazole  Injectable 40 milliGRAM(s) IV Push daily  sodium chloride 0.9%. 1000 milliLiter(s) (100 mL/Hr) IV Continuous <Continuous>    MEDICATIONS  (PRN):  acetaminophen     Tablet .. 650 milliGRAM(s) Oral every 6 hours PRN Mild Pain (1 - 3)  FIRST- Mouthwash  BLM 5 milliLiter(s) Swish and Spit every 6 hours PRN Mouth Care  hydrocodone/homatropine Syrup 5 milliLiter(s) Oral every 6 hours PRN Cough  metoclopramide Injectable 5 milliGRAM(s) IV Push three times a day PRN nausea  oxyCODONE    IR 5 milliGRAM(s) Oral every 6 hours PRN Moderate Pain (4 - 6)      =======================================================  ----- PHYSICAL EXAM:  Vital Signs Last 24 Hrs  T(C): 36.4 (08 Nov 2021 05:00), Max: 36.8 (07 Nov 2021 22:34)  T(F): 97.5 (08 Nov 2021 05:00), Max: 98.2 (07 Nov 2021 22:34)  HR: 136 (08 Nov 2021 11:57) (111 - 140)  BP: 112/76 (08 Nov 2021 11:57) (74/38 - 114/72)  BP(mean): --  RR: 16 (08 Nov 2021 11:57) (16 - 19)  SpO2: 98% (08 Nov 2021 11:57) (98% - 100%) I&O's Summary    GEN: Awake, LETHARGIC, NAD  HEAD: Normocephalic and atraumatic,   EYES: Anicteric sclerae, EOM's grossly intact  NECK: No JVD, no thyromegaly  PULM: Comfortable work of breathing, clear BS  CV: Pulses 2+ symmetric bilaterally, regular rate and rhythm  ABD: Not distended, soft, non-tender,   EXT: No edema, No deformities  PSYCH: Appropriate mood and affect, no suicidal ideations elicited  NEURO: No facial asymmetry, no tremors, no observed movement disorders  SKIN: No rashes or lesions on exposed skin, No jaundice    =======================================================  ----- LABS:                        11.9   x     )-----------( 257      ( 08 Nov 2021 14:17 )             37.4   11-08    144  |  112<H>  |  x   ----------------------------<  x   3.7   |  x   |  x     Ca    8.4      07 Nov 2021 07:14  Phos  3.1     11-07  Mg     2.30     11-08          -----RADIOLOGY & ADDITIONAL STUDIES:    PROTEIN CALORIE MALNUTRITION PRESENT: [ ] Yes [ ] No  [ ] PPSV2 < or = to 30% [ ] significant weight loss  [ ] poor nutritional intake [ ] catabolic state [ ] anasarca         REFERRALS:   [ ]Chaplaincy  [ ] Hospice  [ ]Child Life  [ ]Social Work  [ ]Case management [ ]Holistic Therapy   Goals of Care Discussion Document:     ************************************************************************  PALLIATIVE MEDICINE COORDINATION OF CARE DOCUMENTATION  [x] Inpatient Consult  [ ] Other:  ************************************************************************  ------------------------------------------------------------------------  COORDINATION OF CARE:  --- Palliative Care consulted for: [facility]  --- Patient assessed: 11/8  --- Patient previously seen by Palliative Care service: NO  ADVANCE CARE PLANNING  --- Code status: FULL  --- MOLST reviewed in chart:   --- HCP/ Surrogate: ELLE DAVALOS (ANH)  --- GOC document found in [facility]: NONE  --- HCP/ Living will/ Other advanced directives in Alpha: NONE  ------------------------------------------------------------------------  CARE PROVIDER DOCUMENTATION:  -- Discussed w/ Onc  --- Discussed w/ Medicine  --- Discussed with HCN  --- EPS: Continue Eliquis 2.5mg po BID for thromboembolic ppx  given that patient has a [id number] score of 4 (age, sex, HTN)  ------------------------------------------------------------------------  --- Chart reviewed: 310 Minutes [including time used to gather, review and transfer data to this note]  --- Start: 12:30  --- End: 1:00  Prolonged services rendered, as part of this patient's care provided by Palliative Medicine, include: i.chart review for provider and ancillary service documentation, ii.pertinent diagnostics including laboratory and imaging studies,iii. medication review including PRN use, iv. admission history including previous palliative care encounters and GOC notes, v.advance care planning documents including HCP and MOLST forms in Alpha. Part of Palliative Medicine extended evaluation and management also involves coordination of care with our IDT, the primary and consulting yajaira, and unit CM/SW and Hospice if eligible. Recommendations based on the information gathered and discussed are outline in the AP of our notes.    ************************************************************************

## 2021-11-08 NOTE — CHART NOTE - NSCHARTNOTEFT_GEN_A_CORE
ACP MEDICINE COVERAGE - Medicine Subsequent Hospital Care Note    CC:  HPI/Subjective:    ROS:  Denies fever, chills, diaphoresis, malaise, night sweats, generalized weakness, headache, changes in vision, dizziness, cough, sputum production, wheezing, hemoptysis, chest pain, palpitations, shortness of breath, dyspnea on exertion, PND, dysphagia, new abdominal pain, nausea, vomiting, diarrhea, constipation, melena, hematochezia, dysuria, increased urinary frequency/urgency, hematuria, nocturia, numbness/weakness/tingling, any other complaints.    [  ] I spoke with the attending, nurse, and family to obtain the history.  [  ] Unable to obtain history due to ___________.    Vital Signs Last 24 Hrs  T(C): 36.7 (11-08-21 @ 16:39), Max: 36.8 (11-07-21 @ 22:34)  T(F): 98 (11-08-21 @ 16:39), Max: 98.2 (11-07-21 @ 22:34)  HR: 124 (11-08-21 @ 16:39) (111 - 136)  BP: 102/62 (11-08-21 @ 16:39) (74/38 - 112/76)  RR: 16 (11-08-21 @ 16:39) (16 - 19)  SpO2: 98% (11-08-21 @ 16:39) (98% - 99%) on (O2)    PHYSICAL EXAM:  Constitutional: NAD, well-developed, well-nourished  Ears, nose, Mouth, and Throat: normal external ears and nose, normal hearing, moist oral mucosa  Eyes: normal conjunctiva, EOMI, PEERL  Neck: supple, no JVD  Respiratory: Clear to auscultation bilaterally. No wheezes, rales or rhonchi. Normal respiratory effort  Cardiovascular: regular rate and rhythm, S1 and S2, no murmurs, rubs or gallops, no edema, 2+ peripheral pulses  Gastrointestinal: soft, nontender, nondistended, +bowel sounds, no hernia  Skin: warm, dry, no rash  Neurologic: sensation grossly intact, CN grossly intact, non-focal exam  Musculoskeletal: no clubbing, no cyanosis, no joint swelling  Psychiatric: A&Ox3, appropriate mood, affect    LABS:                        11.9   19.99 )-----------( 257      ( 08 Nov 2021 14:17 )             37.4     11-08    144  |  112<H>  |  25<H>  ----------------------------<  106<H>  3.7   |  15<L>  |  1.31<H>    Ca    8.2<L>      08 Nov 2021 14:17  Phos  4.4     11-08  Mg     2.30     11-08    TPro  5.5<L>  /  Alb  2.5<L>  /  TBili  0.5  /  DBili  x   /  AST  19  /  ALT  16  /  AlkPhos  108  11-08      CARDIAC ENZYMES            Serum Pro-Brain Natriuretic Peptide:   D-Dimer Assay:     Urinanalysis Basic (11-08-21 @ 17:38):      Blood Gas Venous (11-08-21 @ 17:38):  pH: 7.40 | HCO3: 28 | pCO2: 45 | pO2: 34 | Lactate: 3.4      Blood Gas Arterial (11-08-21 @ 17:38):      CAPILLARY BLOOD GLUCOSE:      COVID PCR:      RADIOLOGY:    MEDICATIONS  (STANDING):  apixaban 2.5 milliGRAM(s) Oral every 12 hours  artificial  tears Solution 1 Drop(s) Both EYES four times a day  buDESOnide    Inhalation Suspension 0.5 milliGRAM(s) Inhalation daily  influenza  Vaccine (HIGH DOSE) 0.7 milliLiter(s) IntraMuscular once  levothyroxine 75 MICROGram(s) Oral daily  metoprolol tartrate Injectable 5 milliGRAM(s) IV Push every 6 hours  pantoprazole  Injectable 40 milliGRAM(s) IV Push daily  sodium chloride 0.9%. 1000 milliLiter(s) (100 mL/Hr) IV Continuous <Continuous>    MEDICATIONS  (PRN):  acetaminophen     Tablet .. 650 milliGRAM(s) Oral every 6 hours PRN Mild Pain (1 - 3)  FIRST- Mouthwash  BLM 5 milliLiter(s) Swish and Spit every 6 hours PRN Mouth Care  hydrocodone/homatropine Syrup 5 milliLiter(s) Oral every 6 hours PRN Cough  metoclopramide Injectable 5 milliGRAM(s) IV Push three times a day PRN nausea  oxyCODONE    IR 5 milliGRAM(s) Oral every 6 hours PRN Moderate Pain (4 - 6)    I&O's Summary    I reviewed the above labs, radiology, medications, tests, telemetry, and EKG interpretation.    ASSESSMENT/PLAN:    - Clinical findings, labs, tests, telemetry, and ekg reviewed with attending. Will monitor patient closely.       Low complexity/risk (30min)  Afib: IV lopressor 5mg q 6hrs: monitor HR and BP : afib with RVR   will f/u with palliative regarding fentanyl patch: Dr. Gonzalez to d/w daughter in am : IV tylenol given   WBC 19 : f/u cxr : UA not sent asymptomatic   cr 1.3 : IVF: f/u bladder scan ---------------------  daughter at bedside translating as per Pt preferred daughter to translate at this time   gi consulted for evaluation for peg placement   d/w Dr. Boswell in agreement with plan

## 2021-11-08 NOTE — PROGRESS NOTE ADULT - PROBLEM SELECTOR PLAN 1
- Known Afib; had afib w/ rvr to 140s   - s/p 120mg diltiazem and metoprolol succinate 100mg-- with bradycardia cards rec avoid ca channel blocker  - Given malignancy, general profile, though not currently hypoxic can consider PE but at this time given DNR/ DNI comfort, will not pursue further studies  - EKG showed Afib QTc: 464  - c/w BB, Apixaban  - f/u cards recs - Known Afib; had afib w/ rvr to 140s   - s/p 120mg diltiazem and metoprolol succinate 100mg-- with bradycardia cards rec avoid ca channel blocker  - Given malignancy, general profile, though not currently hypoxic can consider PE but at this time given DNR/ DNI comfort, will not pursue further studies  - EKG showed Afib QTc: 464  - change metoprolol 5 IV q6 standing   - f/u EP recs

## 2021-11-09 NOTE — PROGRESS NOTE ADULT - PROBLEM SELECTOR PLAN 2
-  poss due to volume contraction and underlying ca   - afebrile   - CXR LT sided white out f/u official  - check UA

## 2021-11-09 NOTE — PROGRESS NOTE ADULT - ASSESSMENT
This is a 81 year old woman with a pmhx of hypothyroidism, GERD, HTN,  asthma, and afib (on Eliquis, metoprolol, and Cardizem), NSCLC (2017) now on 4th line chemo: Venofer/ Gemzar  (10/6) who presented to the ED for worsening difficulty with eating and admitted fro FTT. Found to be in Afib with RVR with tacy-vu syndrome HR went to the 40 after Cardizem push.   Plan:  - Continuous telemetric monitoring   - Monitor electrolytes and replete K to 4 and Mg to 2  - Obtain TTE  - Continue Eliquis 2.5mg po BID for thromboembolic ppx given that patient has a [id number] score of 4 (age, sex, HTN)  - Continue home medication 50mg po qd or metoprolol 2.5mg IV q6h prn if unable to tolerate oral medication. Hold for SBP less then 90mmHg and HR less then 55bpm  - Continue care per primary team    Jon Keys PA-C  Patient to be staffed with attending. Please await attending addendum This is a 81 year old woman with a pmhx of hypothyroidism, GERD, HTN,  asthma, and afib (on Eliquis, metoprolol, and Cardizem), NSCLC (2017) now on 4th line chemo: Venofer/ Gemzar  (10/6) who presented to the ED for worsening difficulty with eating and admitted fro FTT. Found to be in Afib with RVR with tacy-vu syndrome HR went to the 40 after Cardizem push. . Discussed PPM which the patient and her daughters Christal and Dr. Ivanna Olivares if the patient is unable to be rate control with medication due to bradycardia. Patient had not been bradycardiac for the past 72 hours but in AFib with RVR,    Plan:  # Tachybrady syndrome   - Continuous telemetric monitoring   - Monitor electrolytes and replete K to 4 and Mg to 2  - monitor sCr level  - Obtain TTE  - Continue Eliquis 2.5mg po BID for thromboembolic ppx given that patient has a [id number] score of 4 (age, sex, HTN)  - Consider digoxin loading patient then 125mcg po qd. Spoke to the Daughter Dr. Olivares who was at patient bedside today who is in agreement with starting digoxin despite her JOSE G, Obtain digoxin level in 3 days about 6 hours after the dose  - Continue care per primary team    Jon Keys PA-C  Patient to be staffed with attending. Please await attending addendum

## 2021-11-09 NOTE — CHART NOTE - NSCHARTNOTEFT_GEN_A_CORE
Contacted regarding non-urgent/non-emergent endoscopic procedure for PEG evaluation.    Patient with is not medically optimized with afib/rvr with soft BP and family with ongoing discussion regarding decision to place a PEG and GOC. EPS and palliative on board and evaluating.  Please contact GI for PEG evaluation when the patient is medically optimized and GOC determined/family has confirmed they want to pursue PEG placement.    Elizabeth Law MD  Gastroenterology/Hepatology Fellow, PGY-V    NON-URGENT CONSULTS:  Please email [email or web address]  AT NIGHT AND ON WEEKENDS:  Contact on-call GI fellow via answering service ([phone]) from 5pm-8am and on weekends/holidays  MONDAY-FRIDAY 8AM-5PM:  Pager# [phone] ([facility])  GI Phone# [phone] ([facility])

## 2021-11-09 NOTE — PROGRESS NOTE ADULT - SUBJECTIVE AND OBJECTIVE BOX
HPI:  81F Hx hypothyroidism, GERD, HTN,  asthma, afib w/ Eliquis, metoprolol, Cardizem, NSCLC (2017) now on 4th line chemo: Venofer/ Gemzar  (10/6) p/w worsening difficulty with eating x weeks. Palliative Medicine was consulted for complex medical decision making related to goals of care discussions    =======================================================  11/9/2021    - Chart reviewed. Patient seen and examined.  - Met with Dr. Davalos (daughter) today:    1) DNR, DNI  2) No PEG tube  3) Home with hospice    =======================================================  ----- SYMPTOM ASSESSMENT:   [ ]Unable to obtain due to poor mentation: information obtained from team/ contact    PAIN ASSESSMENT: DENIED  Site-   Onset-   Character-   Radiation-   Associated symptoms-   Timing and duration-   Exacerbating factors  Severity-     Effect on QOL- SIGNIFICANTLY INTERFERES WITH ADL'S  PAIN AD Score: 0    Dyspnea:  Yes [ ] No [X ] - [ ]Mild [ ]Moderate [ ]Severe  Anxiety:    Yes [ ] No [ X] - [ ]Mild [ ]Moderate [ ]Severe  Fatigue:    Yes [ ] No [X ] - [ ]Mild [ ]Moderate [ ]Severe  Nausea:    Yes [ ] No [ X] - [ ]Mild [ ]Moderate [ ]Severe                         Loss of appetite: Yes [ ] No [X ] - [ ]Mild [ ]Moderate [ ]Severe             Constipation:  Yes [ ] No [X ] - [ ]Mild [ ]Moderate [ ]Severe  Grief: Yes [ ] No [X ]     [X ]All other review of systems negative, including: weakness, cough, colds, blurry vision, headaches, dysuria, pruritus, palpitations, muscle cramps, easy bruising, epistaxis, rashes    =======================================================  ----- PERTINENT PMH/ SXH/ FHX  Hypertension    Asthma    GERD (gastroesophageal reflux disease)    Hypothyroidism    Other nonspecific abnormal finding of lung field    Lung cancer    Paralysis of vocal cords    Atrial flutter    Atrial fibrillation      No significant past surgical history    History of lung biopsy    H/O colonoscopy    H/O vocal cord paralysis    History of laryngoscopy      FAMILY HISTORY:  Family history of cancer (Sibling)        ----- SOCIAL HISTORY:   [ ] Unable to elicit  Significant other/partner:    Children:   Jehovah's witness/Spirituality:  Substance hx: Yes[ ]  No [ ]   Tobacco hx:  Yes [ ] No [ ]   Alcohol hx: Yes [ ] No [ ]   Home Opioid hx:  [ ] I-Stop Reference No:  Living Situation: [ ]Home  [ ]Long term care  [ ]Rehab [ ]Other    ----- ADVANCE DIRECTIVES:    DNR  MOLST  [ ]  Living Will  [ ]   DECISION MAKER(s):  [ ] Health Care Proxy(s)  [ ] Surrogate(s)  [ ] Guardian           Name(s): Phone Number(s):  DR. KARLO DAVALOS (DAUGHTER) @ [phone]    ----- BASELINE (I)ADL(s) (prior to admission):  Wheeler: [ ]Total  [ ] Moderate [ ]Dependent  Palliative Performance Status Version 2:   60     =======================================================  -----MEDICATIONS AND ALLERGIES:  Allergies    No Known Allergies    Intolerances    MEDICATIONS  (STANDING):  apixaban 2.5 milliGRAM(s) Oral every 12 hours  artificial  tears Solution 1 Drop(s) Both EYES four times a day  buDESOnide    Inhalation Suspension 0.5 milliGRAM(s) Inhalation daily  digoxin  Injectable 250 MICROGram(s) IV Push every 8 hours  influenza  Vaccine (HIGH DOSE) 0.7 milliLiter(s) IntraMuscular once  levothyroxine 75 MICROGram(s) Oral daily  metoprolol tartrate Injectable 5 milliGRAM(s) IV Push every 6 hours  pantoprazole  Injectable 40 milliGRAM(s) IV Push daily  sodium chloride 0.9%. 1000 milliLiter(s) (100 mL/Hr) IV Continuous <Continuous>    MEDICATIONS  (PRN):  acetaminophen     Tablet .. 650 milliGRAM(s) Oral every 6 hours PRN Mild Pain (1 - 3)  FIRST- Mouthwash  BLM 5 milliLiter(s) Swish and Spit every 6 hours PRN Mouth Care  hydrocodone/homatropine Syrup 5 milliLiter(s) Oral every 6 hours PRN Cough  metoclopramide Injectable 5 milliGRAM(s) IV Push three times a day PRN nausea  oxyCODONE    IR 5 milliGRAM(s) Oral every 6 hours PRN Moderate Pain (4 - 6)    =======================================================  ----- PHYSICAL EXAM:  Vital Signs Last 24 Hrs  T(C): 36.4 (09 Nov 2021 12:17), Max: 36.8 (08 Nov 2021 20:39)  T(F): 97.5 (09 Nov 2021 12:17), Max: 98.2 (08 Nov 2021 20:39)  HR: 71 (09 Nov 2021 12:17) (71 - 148)  BP: 99/60 (09 Nov 2021 12:17) (85/42 - 112/59)  BP(mean): --  RR: 16 (09 Nov 2021 12:17) (16 - 18)  SpO2: 100% (09 Nov 2021 12:17) (96% - 100%)    GEN: Awake, LETHARGIC, NAD  HEAD: Normocephalic and atraumatic,   EYES: Anicteric sclerae, EOM's grossly intact  NECK: No JVD, no thyromegaly  PULM: Comfortable work of breathing, clear BS  CV: Pulses 2+ symmetric bilaterally, regular rate and rhythm  ABD: Not distended, soft, non-tender,   EXT: No edema, No deformities  PSYCH: Appropriate mood and affect, no suicidal ideations elicited  NEURO: No facial asymmetry, no tremors, no observed movement disorders  SKIN: No rashes or lesions on exposed skin, No jaundice    =======================================================  ----- LABS:                        11.9   x     )-----------( 257      ( 08 Nov 2021 14:17 )             37.4   11-08    144  |  112<H>  |  x   ----------------------------<  x   3.7   |  x   |  x     Ca    8.4      07 Nov 2021 07:14  Phos  3.1     11-07  Mg     2.30     11-08

## 2021-11-09 NOTE — PROGRESS NOTE ADULT - ASSESSMENT
81F Hx hypothyroidism, GERD, HTN,  asthma, afib w/ Eliquis &  metoprolol + Cardizem, lung cancer s/p chemo and radiation &y immunotherapy 02/2019 but currently getting Venofer/ Gemzar last dose 10/6 p/w worsening difficulty with eating x weeks admitted for Afib w/ rvr and FTT now DNR/DNI.

## 2021-11-09 NOTE — PROGRESS NOTE ADULT - PROBLEM SELECTOR PLAN 4
- NSCLC on 4th line chemotherapy   - unable to really take much PO, soft foods and soup with abd pain and pressure   - first blm mouthwash and hicodine for cough  - NS @ 100 ml/h  - case d/w daughter at bedside can control symptoms with reglan 10 IV q6 and morphine IV 2 mg IV q4 PRN. GI unclear if can tolerate PEG placement

## 2021-11-09 NOTE — PROGRESS NOTE ADULT - PROBLEM SELECTOR PLAN 1
- Known Afib; had afib w/ rvr to 140s   - s/p 120mg diltiazem and metoprolol succinate 100mg-- with bradycardia cards rec avoid ca channel blocker  - EKG showed Afib QTc: 464  - change metoprolol 5 IV q6 standing with hold paramters  - case d/w EP plan for digoxin load as BP borderline   - CT A/P on admission showing pericardial effusion   - TTE-P  - f/u EP recs

## 2021-11-09 NOTE — PROGRESS NOTE ADULT - SUBJECTIVE AND OBJECTIVE BOX
Patient is a 81y old  Female who presents with a chief complaint of Afib with RVR (09 Nov 2021 11:37)      SUBJECTIVE / OVERNIGHT EVENTS: Noted back pain this AM. + nausea persists. Tele HR in 130's   ADDITIONAL REVIEW OF SYSTEMS: denies Cp/SOB    MEDICATIONS  (STANDING):  apixaban 2.5 milliGRAM(s) Oral every 12 hours  artificial  tears Solution 1 Drop(s) Both EYES four times a day  buDESOnide    Inhalation Suspension 0.5 milliGRAM(s) Inhalation daily  digoxin  Injectable 250 MICROGram(s) IV Push every 8 hours  influenza  Vaccine (HIGH DOSE) 0.7 milliLiter(s) IntraMuscular once  levothyroxine 75 MICROGram(s) Oral daily  metoprolol tartrate Injectable 5 milliGRAM(s) IV Push every 6 hours  pantoprazole  Injectable 40 milliGRAM(s) IV Push daily  sodium chloride 0.9%. 1000 milliLiter(s) (100 mL/Hr) IV Continuous <Continuous>    MEDICATIONS  (PRN):  acetaminophen     Tablet .. 650 milliGRAM(s) Oral every 6 hours PRN Mild Pain (1 - 3)  FIRST- Mouthwash  BLM 5 milliLiter(s) Swish and Spit every 6 hours PRN Mouth Care  hydrocodone/homatropine Syrup 5 milliLiter(s) Oral every 6 hours PRN Cough  metoclopramide Injectable 5 milliGRAM(s) IV Push three times a day PRN nausea  oxyCODONE    IR 5 milliGRAM(s) Oral every 6 hours PRN Moderate Pain (4 - 6)      CAPILLARY BLOOD GLUCOSE      POCT Blood Glucose.: 85 mg/dL (09 Nov 2021 11:37)  POCT Blood Glucose.: 83 mg/dL (09 Nov 2021 07:40)    I&O's Summary      PHYSICAL EXAM:  Vital Signs Last 24 Hrs  T(C): 36.4 (09 Nov 2021 12:17), Max: 36.8 (08 Nov 2021 20:39)  T(F): 97.5 (09 Nov 2021 12:17), Max: 98.2 (08 Nov 2021 20:39)  HR: 71 (09 Nov 2021 12:17) (71 - 148)  BP: 99/60 (09 Nov 2021 12:17) (85/42 - 112/59)  BP(mean): --  RR: 16 (09 Nov 2021 12:17) (16 - 18)  SpO2: 100% (09 Nov 2021 12:17) (96% - 100%)    CONSTITUTIONAL: NAD elderly female   EYES: PERRLA; conjunctiva and sclera clear  ENMT: Moist oral mucosa, no pharyngeal injection or exudates; normal dentition  NECK: Supple, no palpable masses; no thyromegaly  RESPIRATORY: Normal respiratory effort; lungs are clear to auscultation bilaterally  CARDIOVASCULAR: s1/s2 irregular irregular  ABDOMEN: Nontender to palpation, normoactive bowel sounds, no rebound/guarding; No hepatosplenomegaly  MUSCULOSKELETAL: no joint swelling or tenderness to palpation  PSYCH: A+O to person, place, and time; affect appropriate    LABS:                        12.8   18.75 )-----------( 244      ( 09 Nov 2021 08:20 )             42.8     11-09    142  |  115<H>  |  28<H>  ----------------------------<  93  4.7   |  11<L>  |  1.35<H>    Ca    7.9<L>      09 Nov 2021 08:20  Phos  4.3     11-09  Mg     2.20     11-09    TPro  5.4<L>  /  Alb  2.3<L>  /  TBili  0.4  /  DBili  x   /  AST  25  /  ALT  16  /  AlkPhos  108  11-09                RADIOLOGY & ADDITIONAL TESTS:  Results Reviewed: xray white out of LT lung unchnaged my int.    Imaging Personally Reviewed:  Electrocardiogram Personally Reviewed:    COORDINATION OF CARE:  Care Discussed with Consultants/Other Providers [Y/N]:  Prior or Outpatient Records Reviewed [Y/N]:

## 2021-11-09 NOTE — PROGRESS NOTE ADULT - ASSESSMENT
81F Hx hypothyroidism, GERD, HTN,  asthma, afib w/ Eliquis, metoprolol, Cardizem, NSCLC (2017) now on 4th line chemo: Venofer/ Gemzar  (10/6) p/w worsening difficulty with eating x weeks. Palliative Medicine was consulted for complex medical decision making related to goals of care discussions    ------------------------------  # PAIN  - Had planned for Oxy via PEG if patient wanted a PEG tube  - At this point since patient is planning for hospice, benefits of fentanyl patch (BMI 20) outweigh risks  - Will start on Fentanyl patch 12.5mcg/hr  - Continue Oxy PRN. Can use liquid solution    # DYSPHAGIA  - Vocal cord paralysis  - On Minced and moist diet  - Keep upright. Aspiration precautions  - 11/9: Family decided no PEG tube    # FAILURE TO THRIVE  - Advanced cancer + poor PO intake + debility (PPSV 60)  - 11/9: Family decided no PEG tube    # LUNG CANCER  - Not a candidate for DMT due to poor nutrition  - Hospice appropriate    # ADVANCE CARE PLANNING  - CODE: DNR/ DNI (11/9/21)  - HCP: Dr. Mat Perera (daughter)  - HOSPICE ELIGIBLE: Yes. Hospice referral made  - GOALS:  1) No PEG  2) DNR, DNI  3) Hospice referral    Total face to face time discussing goals of care, advance care planning, code status and hospice = 16 mins

## 2021-11-09 NOTE — PROGRESS NOTE ADULT - PROBLEM SELECTOR PLAN 5
- NSCLC on 4th line chemotherapy   - unable to really take much PO, soft foods and soup with abd pain and pressure   - Now DNR/DNI no pressors, comfort care, palliative and hospice consult  - Onc  - Case d./w Dr. Olivares at bedside given failure of multiple lines of chemo currently given functional status not a candidate for chemo. outlook does not look good understands and her and family considering DNR/DNI and hospice after discussion with palliative.

## 2021-11-09 NOTE — PROGRESS NOTE ADULT - PROBLEM SELECTOR PLAN 3
- suspect due to poor PO tintake  - Fe urea<35% consistent with pre renal state  - bladder scan with 79 ml  - cont IVF  - renal US

## 2021-11-09 NOTE — PROGRESS NOTE ADULT - SUBJECTIVE AND OBJECTIVE BOX
Language Line Devora [id number] Czech   Subjective: Admits nausea and SOB. Denies HA, lightheadedness, dizziness, CP, palpitation, abdominal pain, and N/V.      Interval events:  - Presented to the ED for worsening difficulty with eating after having "stomach virus" 2 weeks ago. Patient was admitted for failure to thrive.  - Heme/onc was consulted given  NSCLC and recommended calling palliative care for GOC discussion. Patient was DNR/DNI and comfort care which are now recinded   -Cardiology was consulted for "tachy - vu syndrome" patient with Afib RVR received metoprolol however difficult to rate control s/p Cardizem IV which subsequently causes the patient to be temporarily bradycardiac to "40's"?.   - Ep consulted for Tacy-vu. Upon telemetric, patient is AFib with RVR -120 mostly with brief episode of HR in the 170s. Patient had no bradycardiac events for the past 48 hours. Patient also had not been getting her metoprolol due to hypotension. Discussed PPM which the patient and her daughters Christal and Dr. Ivanna Olivares if the patient is unable to be rate control with medication due to bradycardia.  Given hypotension, EP plan to digoxin load patient then 125mcg po qd.     MEDICATIONS  (STANDING):  apixaban 2.5 milliGRAM(s) Oral every 12 hours  artificial  tears Solution 1 Drop(s) Both EYES four times a day  buDESOnide    Inhalation Suspension 0.5 milliGRAM(s) Inhalation daily  influenza  Vaccine (HIGH DOSE) 0.7 milliLiter(s) IntraMuscular once  levothyroxine 75 MICROGram(s) Oral daily  metoprolol tartrate Injectable 5 milliGRAM(s) IV Push every 6 hours  pantoprazole  Injectable 40 milliGRAM(s) IV Push daily  sodium chloride 0.9%. 1000 milliLiter(s) (100 mL/Hr) IV Continuous <Continuous>    MEDICATIONS  (PRN):  acetaminophen     Tablet .. 650 milliGRAM(s) Oral every 6 hours PRN Mild Pain (1 - 3)  FIRST- Mouthwash  BLM 5 milliLiter(s) Swish and Spit every 6 hours PRN Mouth Care  hydrocodone/homatropine Syrup 5 milliLiter(s) Oral every 6 hours PRN Cough  metoclopramide Injectable 5 milliGRAM(s) IV Push three times a day PRN nausea  oxyCODONE    IR 5 milliGRAM(s) Oral every 6 hours PRN Moderate Pain (4 - 6)      Vital Signs Last 24 Hrs  T(C): 36.4 (09 Nov 2021 09:20), Max: 36.8 (08 Nov 2021 20:39)  T(F): 97.5 (09 Nov 2021 09:20), Max: 98.2 (08 Nov 2021 20:39)  HR: 128 (09 Nov 2021 09:20) (91 - 148)  BP: 106/61 (09 Nov 2021 09:20) (85/42 - 112/76)  BP(mean): --  RR: 18 (09 Nov 2021 09:20) (16 - 18)  SpO2: 100% (09 Nov 2021 09:20) (96% - 100%)  I&O's Detail    Physical Exam:  GENERAL: Sitting up in bed,speaking in Czech , in NAD  HEART: S1S2 Tachycardiac irregularly irregular ; No murmurs appreciated  PULMONARY: CTABL, normal respiratory effort.    ABDOMEN: + Bowel sounds present, soft, NDNT  EXTREMITIES:  Warm, well -perfused, distal pulses present     TELEMETERIC: AFIB 120-140 mostly with brief episode in 180s                            12.8   18.75 )-----------( 244      ( 09 Nov 2021 08:20 )             42.8       11-09    142  |  115<H>  |  28<H>  ----------------------------<  93  4.7   |  11<L>  |  1.35<H>    Ca    7.9<L>      09 Nov 2021 08:20  Phos  4.3     11-09  Mg     2.20     11-09    TPro  5.4<L>  /  Alb  2.3<L>  /  TBili  0.4  /  DBili  x   /  AST  25  /  ALT  16  /  AlkPhos  108  11-09      < from: Xray Chest 1 View- PORTABLE-Urgent (11.05.21 @ 17:28) >  IMPRESSION:  Left upper lobe mass with associated obstructing atelectasis has increased with trace to small left effusion.      < from: CT Abdomen and Pelvis No Cont (11.05.21 @ 18:01) >  IMPRESSION:  Limited evaluation secondary to lack of IV and oral contrast.  No bowel obstruction.  Questionable thickening versus underdistention of the ascending, transverse and proximal descending colon which may represent colitis.     Language Line Devora [id number] Serbian   Subjective: Admits nausea and SOB. Denies HA, lightheadedness, dizziness, CP, palpitation, abdominal pain, and vomiting.      Interval events:  - Presented to the ED for worsening difficulty with eating after having "stomach virus" 2 weeks ago. Patient was admitted for failure to thrive.  - Heme/onc was consulted given  NSCLC and recommended calling palliative care for GOC discussion. Patient was DNR/DNI and comfort care which are now recinded   -Cardiology was consulted for "tachy - vu syndrome" patient with Afib RVR received metoprolol however difficult to rate control s/p Cardizem IV which subsequently causes the patient to be temporarily bradycardiac to "40's"?.   - Ep consulted for Tacy-vu. Upon telemetric, patient is AFib with RVR -120 mostly with brief episode of HR in the 170s. Patient had no bradycardiac events for the past 72 hours. Patient also had not been getting her metoprolol due to hypotension. Discussed PPM which the patient and her daughters Christal and Dr. Ivanna Olivares if the patient is unable to be rate control with medication due to bradycardia.  Given hypotension, EP plan to digoxin load patient then 125mcg po qd.     MEDICATIONS  (STANDING):  apixaban 2.5 milliGRAM(s) Oral every 12 hours  artificial  tears Solution 1 Drop(s) Both EYES four times a day  buDESOnide    Inhalation Suspension 0.5 milliGRAM(s) Inhalation daily  influenza  Vaccine (HIGH DOSE) 0.7 milliLiter(s) IntraMuscular once  levothyroxine 75 MICROGram(s) Oral daily  metoprolol tartrate Injectable 5 milliGRAM(s) IV Push every 6 hours  pantoprazole  Injectable 40 milliGRAM(s) IV Push daily  sodium chloride 0.9%. 1000 milliLiter(s) (100 mL/Hr) IV Continuous <Continuous>    MEDICATIONS  (PRN):  acetaminophen     Tablet .. 650 milliGRAM(s) Oral every 6 hours PRN Mild Pain (1 - 3)  FIRST- Mouthwash  BLM 5 milliLiter(s) Swish and Spit every 6 hours PRN Mouth Care  hydrocodone/homatropine Syrup 5 milliLiter(s) Oral every 6 hours PRN Cough  metoclopramide Injectable 5 milliGRAM(s) IV Push three times a day PRN nausea  oxyCODONE    IR 5 milliGRAM(s) Oral every 6 hours PRN Moderate Pain (4 - 6)      Vital Signs Last 24 Hrs  T(C): 36.4 (09 Nov 2021 09:20), Max: 36.8 (08 Nov 2021 20:39)  T(F): 97.5 (09 Nov 2021 09:20), Max: 98.2 (08 Nov 2021 20:39)  HR: 128 (09 Nov 2021 09:20) (91 - 148)  BP: 106/61 (09 Nov 2021 09:20) (85/42 - 112/76)  BP(mean): --  RR: 18 (09 Nov 2021 09:20) (16 - 18)  SpO2: 100% (09 Nov 2021 09:20) (96% - 100%)  I&O's Detail    Physical Exam:  GENERAL: Sitting up in bed,speaking in Serbian , in NAD  HEART: S1S2 Tachycardiac irregularly irregular ; No murmurs appreciated  PULMONARY: CTABL, normal respiratory effort.    ABDOMEN: + Bowel sounds present, soft, NDNT  EXTREMITIES:  Warm, well -perfused, distal pulses present     TELEMETERIC: AFIB 120-140 mostly with brief episode in 180s                            12.8   18.75 )-----------( 244      ( 09 Nov 2021 08:20 )             42.8       11-09    142  |  115<H>  |  28<H>  ----------------------------<  93  4.7   |  11<L>  |  1.35<H>    Ca    7.9<L>      09 Nov 2021 08:20  Phos  4.3     11-09  Mg     2.20     11-09    TPro  5.4<L>  /  Alb  2.3<L>  /  TBili  0.4  /  DBili  x   /  AST  25  /  ALT  16  /  AlkPhos  108  11-09      < from: Xray Chest 1 View- PORTABLE-Urgent (11.05.21 @ 17:28) >  IMPRESSION:  Left upper lobe mass with associated obstructing atelectasis has increased with trace to small left effusion.      < from: CT Abdomen and Pelvis No Cont (11.05.21 @ 18:01) >  IMPRESSION:  Limited evaluation secondary to lack of IV and oral contrast.  No bowel obstruction.  Questionable thickening versus underdistention of the ascending, transverse and proximal descending colon which may represent colitis.

## 2021-11-09 NOTE — SWALLOW BEDSIDE ASSESSMENT ADULT - COMMENTS
H&P: 81F Hx hypothyroidism, GERD, HTN,  asthma, afib w/ Eliquis &  metoprolol + Cardizem, lung cancer s/p chemo and radiation &y immunotherapy 02/2019 but currently getting Venofer/ Gemzar last dose 10/6 p/w worsening difficulty with eating x weeks admitted for Afib w/ rvr and FTT now DNR/DNI.     SLP attempted to see patient for bedside swallow evaluation. Upon arrival, patient on her way to [facility]. Therefore, plan to revisit at a later time/as schedule permits for evaluation

## 2021-11-10 NOTE — CHART NOTE - NSCHARTNOTEFT_GEN_A_CORE
Family and EP team in agreement to hold maintenance digoxin as not in agreement with family/patient wishes. Discussed with medical attending. Digoxin discontinued. Team to continue to monitor.

## 2021-11-10 NOTE — PROGRESS NOTE ADULT - PROBLEM SELECTOR PLAN 5
- NSCLC on 4th line chemotherapy   - unable to really take much PO, soft foods and soup with abd pain and pressure   - Case d./w Dr. Olivares at bedside given failure of multiple lines of chemo currently given functional status not a candidate for chemo. outlook does not look good understands and her and family considering DNR/DNI and hospice after discussion with palliative.  - Now DNR/DNI no pressors, comfort care  - pain control with fentanyl and oxycodone  - referred to home hospice  - palliative appreciated - JOSE G due to poor PO tintake  - Fe urea<35% consistent with pre renal state  - bladder scan with 79 ml  - cont IVF  - renal US neg hydro  - Case d/w Daughter over telephone since goal is comfort will avoid labs

## 2021-11-10 NOTE — SWALLOW BEDSIDE ASSESSMENT ADULT - COMMENTS
As per charting, pt is a "81 year old woman with a pmhx of hypothyroidism, GERD, HTN,  asthma, and afib (on Eliquis, metoprolol, and Cardizem), NSCLC (2017) now on 4th line chemo: Venofer/ Gemzar  (10/6) who presented to the ED for worsening difficulty with eating and admitted fro FTT. Found to be in Afib with RVR with tacy-vu syndrome HR went to the 40 after Cardizem push. . Discussed PPM which the patient and her daughters Christal and Dr. Ivanna Olivares if the patient is unable to be rate control with medication due to bradycardia. Patient had not been bradycardiac for the past 72 hours but in AFib with RVR"  WBC is elevated. CXR 11/9/21 revealed "No new focal opacity. Right lung is clear. Redemonstrated left upper lobe atelectasis/mass."    Pt received awake/alert and oriented during initial clinical swallow evaluation this AM. Pt's daughter at bedside, who answered case history questions and served as translater. Pt followed directions and verbalized responces to questions appropriarely. Pt denied pain during swallowing, however pt reported naseau, emesis, and difficulty "keeping food down". Pt refused  PO trials due to significant nassae at time of swallow assessment, despite max SLP verbal encouragement. RN made aware.     1) Defer diet recommendations to MD given pt's refusal and reported nasseau. 2) Reconsult this service as pt is noted with medical improvement. As per charting, pt is a "81 year old woman with a pmhx of hypothyroidism, GERD, HTN,  asthma, and afib (on Eliquis, metoprolol, and Cardizem), NSCLC (2017) now on 4th line chemo: Venofer/ Gemzar  (10/6) who presented to the ED for worsening difficulty with eating and admitted fro FTT. Found to be in Afib with RVR with tacy-vu syndrome HR went to the 40 after Cardizem push. . Discussed PPM which the patient and her daughters Christal and Dr. Ivanna Olivares if the patient is unable to be rate control with medication due to bradycardia. Patient had not been bradycardiac for the past 72 hours but in AFib with RVR"    WBC is elevated. CXR 11/9/21 revealed "No new focal opacity. Right lung is clear. Redemonstrated left upper lobe atelectasis/mass."    Pt received awake/alert and oriented during initial clinical swallow evaluation this AM. Pt's daughter at bedside, who answered case history questions and served as . Pt followed directions and verbalized responses to questions appropriately. Pt denied pain during swallowing, however pt reported nausea, emesis, and difficulty "keeping food down". Pt refused PO trials due to significant nausea at time of swallow assessment, despite max SLP verbal encouragement. RN made aware.

## 2021-11-10 NOTE — HOSPICE CARE NOTE - CONVESATION DETAILS
KAREL met with patient and daughter EMELIA Liang (Candy). PCG Ivanna Perera gave Candy permission to sign consents, which she did. Both patient and family agree with Plan of Care for home hospice. As per Doris, she and family are unsure if they will able to adequately care for patient at home as her decline has been very rapid and she was walking before the hospital. Patient lives with her  who has been caring for her up until now however family does not know if they will be able to care for her at home now that patient can barely walk, has many more needs, has declined significantly. KAREL encouraged Candy to private hire more help. She reports that she needs to speak to her family about this first. KAREL sent Candy private hire list via email. As per Candy, She herself goes in to home 1x weekly and her other 2 sisters cannot commit to go in every day/alternate days. KAREL explained risks of skin breakdown/infection if patient is not cleaned/turned/cared for properly as majority of patient's time is now spent in bed. Candy endorsed understanding. Patient's spouse is elderly and when karel inquired as to whether or not he will be able to manage on his own, Candy's response was "I don't know". KAREL communicated with SUKHWINDER RAMOS CM who is aware of situation. RN HCN Liaison also aware. SW to follow up tomorrow. Discharge on hold until family can private hire/assure HCN that they will each take turns to go in every day and help care for patient.  Iveth Barajas, NORMA KAREL met with patient and daughter EMELIA Liang (Candy). PCG Ivanna Perera gave Candy permission to sign consents, which she did. Both patient and family agree with Plan of Care for home hospice. As per Doris, she and family are unsure if they will able to adequately care for patient at home as her decline has been very rapid and she was walking before the hospital. Patient lives with her  who has been caring for her up until now however family does not know if they will be able to care for her at home now that patient can barely walk, has many more needs, has declined significantly. KAREL encouraged Candy to private hire more help. She reports that she needs to speak to her family about this first. KAREL sent Candy private hire list via email. As per Candy, She herself goes in to home 1x weekly and her other 2 sisters cannot commit to go in every day/alternate days. KAREL explained risks of skin breakdown/infection if patient is not cleaned/turned/cared for properly as majority of patient's time is now spent in bed. Candy endorsed understanding. Patient's spouse is elderly and when karel inquired as to whether or not he will be able to manage on his own, Candy's response was "I don't know". KAREL communicated with SUKHWINDER RAMOS CM who is aware of situation. RN HCN Liaison also aware. SW to follow up tomorrow. Discharge on hold until family can private hire/assure HCN that they will each take turns to go in every day and help care for patient.  NORMA Neely's call was returned by primary care giver daughter Ivanna who reports that patient has HHA services from Medicaid through [facility] care however she only has 12hrs/week which the patient and  have previously declined as they do not want many people in their home. Ivanna reports that she will contact care coordinator and request more hours/speak with patient and father to accept care now that patient's condition has declined. SW to follow up.

## 2021-11-10 NOTE — PROGRESS NOTE ADULT - PROBLEM SELECTOR PLAN 2
-  poss due to volume contraction and underlying ca   - afebrile   - CXR LT sided white no new infiltrate  - UA- positive with epithelial cells without symptoms and afebrile WBC decreasing off antibiotics - K 5.7 hemolyzed   - lokalema x 1

## 2021-11-10 NOTE — CHART NOTE - NSCHARTNOTEFT_GEN_A_CORE
Notified by RN that Pt showed 41 beats of VTach on tele monitor. Pt is currently asymptomatic objectively, however patient is A&O x 1 at baseline.   ROS: unable to be obtained 2/2 mental status.       Vital Signs Last 24 Hrs  T(C): 36.4 (10 Nov 2021 04:15), Max: 36.6 (09 Nov 2021 18:15)  T(F): 97.5 (10 Nov 2021 04:15), Max: 97.8 (09 Nov 2021 18:15)  HR: 100 (10 Nov 2021 04:15) (71 - 148)  BP: 124/54 (10 Nov 2021 04:15) (91/46 - 126/65)  BP(mean): --  RR: 17 (10 Nov 2021 04:15) (16 - 18)  SpO2: 97% (10 Nov 2021 04:15) (96% - 100%)    Physical Exam:   General: NAD, A&Ox3, WN/WD  Eyes: PERRLA, EOMI, Vision grossly intact b/l  Resp: CTA b/l, no wheezing, rales, rhonchi  Cardio: RRR, nl S1/2  Extremities: no LE edema               12.8   18.75 )-----------( 244      ( 09 Nov 2021 08:20 )             42.8   11-09    142  |  115<H>  |  28<H>  ----------------------------<  93  4.7   |  11<L>  |  1.35<H>    Ca    7.9<L>      09 Nov 2021 08:20  Phos  4.3     11-09  Mg     2.20     11-09    TPro  5.4<L>  /  Alb  2.3<L>  /  TBili  0.4  /  DBili  x   /  AST  25  /  ALT  16  /  AlkPhos  108  11-09      ASSESSMENT:    81 y/p F with PMH hypothyroidism, GERD, HTN,  asthma, afib w/ Eliquis &  metoprolol + Cardizem, lung cancer s/p chemo and radiation &y immunotherapy-completed 02/2019 now presenting with 41 beats of vtach       Problem:   - Non-sustained Ventricular Tachycardia    PLAN:  - EKG  - BMP, Mg + Phos, replete lytes prn  - Cardiac Enzymes  - Continue monitoring on Telemetry  - Consider Cardio input in am    Will continue to monitor     Low complexity/risk (Time> 15min)

## 2021-11-10 NOTE — PROGRESS NOTE ADULT - ATTENDING COMMENTS
81 year old woman with a pmhx of hypothyroidism, GERD, HTN,  asthma, and afib (on Eliquis, metoprolol, and Cardizem), NSCLC (2017) now on 4th line chemo: Venofer/ Gemzar  (10/6) who presented to the ED for worsening difficulty with eating and admitted fro FTT. Found to be in Afib with RVR with tachy-vu syndrome HR went to the 40 after Cardizem push. . Discussed PPM which the patient and her daughters Christal and Dr. Ivanna Olivares if the patient is unable to be rate control with medication due to bradycardia. Patient had not been bradycardiac for the past 72 hours but in AFib with RVR. Currently pursuing palliative options. Will sign off.
81 year old woman with a pmhx of hypothyroidism, GERD, HTN,  asthma, and afib (on Eliquis, metoprolol, and Cardizem), NSCLC (2017) now on 4th line chemo: Venofer/ Gemzar  (10/6) who presented to the ED for worsening difficulty with eating and admitted fro FTT. Found to be in Afib with RVR with tacy-vu syndrome HR went to the 40 after Cardizem push. . Discussed PPM which the patient and her daughters Christal and Dr. Ivanna Olivares if the patient is unable to be rate control with medication due to bradycardia. Patient had not been bradycardiac for the past 72 hours but in AFib with RVR. Will follow for possible PPM.
Martha Nava is an 81 year old woman, [place]ian-speaking, with history of Afib (on Eliquis), hypothyroidism, GERD, HTN,  asthma, and squamous cell carcinoma of the lung (dx 2017, currently off chemo) with vocal cord paralysis. She presented with progressive difficulty eating over past several weeks, associated with sob and failure to thrive. There has been afib with RVR, ECG showing  bpm. She received metoprolol and subsequently diltiazem followed by transient bradycardia with HR  40’s bpm. Labs 11/6 /21 noted serum potassium 3.2 mmol/L, serum calcium 8.0 mg/dL; bicarb 19 mmol/L, serum creatinine 0.69 mg/dL.  Agree with management plan to avoid combination of beta blocker with non-dihydropyridine calcium channel blocker. Maintain: apixaban (Eliquis) 2.5 mg oral twice daily, artificial tears solution 1 drop both eyes four times daily, budesonide Inhalation Suspension 0.5 mg Inhalation daily, levothyroxine 75 mCg Oral daily, metoprolol succinate ER (Toprol XL) 100 mg oral at bedtime and pantoprazole 40 mg IV Push daily. Goal for HR is < 110 bpm. An echocardiogram is planned to assess LV size segmental wall motion and overall function. Check thyroid panel to assess effectiveness of current levothyroxine dose

## 2021-11-10 NOTE — PROGRESS NOTE ADULT - ASSESSMENT
81F Hx hypothyroidism, GERD, HTN,  asthma, afib w/ Eliquis, metoprolol, Cardizem, NSCLC (2017) now on 4th line chemo: Venofer/ Gemzar  (10/6) p/w worsening difficulty with eating x weeks. Palliative Medicine was consulted for complex medical decision making related to goals of care discussions    ------------------------------  # NAUSEA  - Baseline + worsened past 24h. As I suspected, she seems like she is not tolerating the Fentanyl patch. Worsened nausea + lethargy + overall feeling worse once Fentanyl started.  - Schedule Reglan 5mg PO q8h ATC  - Zofran 4mg PO PRN q4h   - Do NOT give Ativan for nausea    # PAIN  - Not tolerated Fentanyl. Will d/c. D/w daughter.  - Will keep on Oxycodone solution 5mg PO q3h PRN pain    # DYSPHAGIA  - Vocal cord paralysis  - On Minced and moist diet  - Keep upright. Aspiration precautions  - 11/9: Family decided no PEG tube    # FAILURE TO THRIVE  - Advanced cancer + poor PO intake + debility (PPSV 60)  - 11/9: Family decided no PEG tube    # LUNG CANCER  - Not a candidate for DMT due to poor nutrition  - Hospice appropriate    # ADVANCE CARE PLANNING  - CODE: DNR/ DNI (11/9/21)  - HCP: Dr. Mat Perera (daughter)  - HOSPICE ELIGIBLE: Yes. Hospice referral made  - GOALS:  1) No PEG  2) DNR, DNI  3) Hospice referral

## 2021-11-10 NOTE — CHART NOTE - NSCHARTNOTEFT_GEN_A_CORE
LATE ENTRY:  Discussed case with medical attending in AM  K 5.7 mildly hemolyzed  Per GOC conversation between attending and family, family wanting comfort measures at this time. Family not wanting aggressive measures. Will continue to monitor.       UPDATE: Lokelma 10 g x 1 ordered, will stop all blood draws per family and patient wishes. Team to continue to monitor.

## 2021-11-10 NOTE — PROGRESS NOTE ADULT - PROBLEM SELECTOR PLAN 6
- known hypothyroidism,  - c/w synthroid 75mcg - NSCLC on 4th line chemotherapy   - unable to really take much PO, soft foods and soup with abd pain and pressure   - Case d./w Dr. Olivares at bedside given failure of multiple lines of chemo currently given functional status not a candidate for chemo. outlook does not look good understands and her and family considering DNR/DNI and hospice after discussion with palliative.  - Now DNR/DNI no pressors, comfort care  - pain control with oxycodone PO  - referred to home hospice

## 2021-11-10 NOTE — PROGRESS NOTE ADULT - PROBLEM SELECTOR PLAN 4
- JOSE G due to poor PO tintake  - Fe urea<35% consistent with pre renal state  - bladder scan with 79 ml  - cont IVF  - renal US neg hydro  - Case d/w Daughter over telephone since goal is comfort will avoid labs - NSCLC on 4th line chemotherapy   - unable to really take much PO, soft foods and soup with abd pain and pressure   - first blm mouthwash and hicodine for cough  - NS @ 100 ml/h  - family and pt wishing to avoid PEG  - On Minced and moist diet   - case d/w daughter pleasure feeds as tolerated  - appreciate palliaitve recs reglan standing with PRN zofran no ativan for nausea

## 2021-11-10 NOTE — PROGRESS NOTE ADULT - PROBLEM SELECTOR PLAN 1
- Known Afib; had afib w/ rvr   - s/p 120mg diltiazem and metoprolol succinate 100mg-- with bradycardia cards rec avoid ca channel blocker  - EKG showed Afib QTc: 464  - CT A/P on admission showing pericardial effusion   - TTE-P  - digoxin load as BP borderline with midodrine and digoxin 125 mcg qd   - family wishing to avoid invasive procedures   - f/u EP recs - Known Afib; had afib w/ rvr   - s/p 120mg diltiazem and metoprolol succinate 100mg-- with bradycardia cards rec avoid ca channel blocker  - EKG showed Afib QTc: 464  - CT A/P on admission showing pericardial effusion   - digoxin load as BP borderline with midodrine and digoxin 125 mcg qd   - family wishing to avoid invasive procedures will cancel TTE   - case d/w Daughter in regards to continuing digoxin unable to monitor for digoxin tox as per EP recs would like to speak with EP   - f/u EP recs

## 2021-11-10 NOTE — SWALLOW BEDSIDE ASSESSMENT ADULT - SWALLOW EVAL: RECOMMENDED DIET
1) Defer diet recommendations to MD given pt's refusal and reported nausea. 2) Reconsult this service as pt is noted with medical improvement.

## 2021-11-10 NOTE — PROGRESS NOTE ADULT - ASSESSMENT
This is a 81 year old woman with a pmhx of hypothyroidism, GERD, HTN,  asthma, and afib (on Eliquis, metoprolol, and Cardizem), NSCLC (2017) now on 4th line chemo: Venofer/ Gemzar  (10/6) who presented to the ED for worsening difficulty with eating and admitted fro FTT. Found to be in Afib with RVR with tacy-vu syndrome HR went to the 40 after Cardizem push. . Discussed PPM which the patient and her daughters Christal and Dr. Ivanna Olivares if the patient is unable to be rate control with medication due to bradycardia. Patient had not been bradycardiac for the past 72 hours but in AFib with RVR,    Plan:  # Tachybrady syndrome   - Continuous telemetric monitoring   - Monitor electrolytes and replete K to 4 and Mg to 2  - monitor sCr level  - Obtain TTE  - Continue Eliquis 2.5mg po BID for thromboembolic ppx given that patient has a [id number] score of 4 (age, sex, HTN)  - s/p digoxin 250mcg x 3; obtain digoxin level and then can consider starting digoxin 125mcg PO qd  - Continue care per primary team   This is a 81 year old woman with a pmhx of hypothyroidism, GERD, HTN,  asthma, and afib (on Eliquis, metoprolol, and Cardizem), NSCLC (2017) now on 4th line chemo: Venofer/ Gemzar  (10/6) who presented to the ED for worsening difficulty with eating and admitted fro FTT. Found to be in Afib with RVR with tacy-vu syndrome HR went to the 40 after Cardizem push. . Discussed PPM which the patient and her daughters Domingocrystal and Dr. Ivanna Olivares if the patient is unable to be rate control with medication due to bradycardia. Patient had not been bradycardiac for the past 72 hours but in AFib with RVR,    Plan:  # Tachybrady syndrome   - Continuous telemetric monitoring   - Monitor electrolytes and replete K to 4 and Mg to 2  - As per primary team, patient and family is pursuing palliative care options and discussed with family regarding continuing digoxin; as per family would not want patient to get continued blood draws and would like patient to remain comfortable; at this time, family and EP in agreement to hold maintenance digoxin   - Continue care per primary team

## 2021-11-10 NOTE — PROGRESS NOTE ADULT - PROBLEM SELECTOR PLAN 3
- NSCLC on 4th line chemotherapy   - unable to really take much PO, soft foods and soup with abd pain and pressure   - first blm mouthwash and hicodine for cough  - NS @ 100 ml/h  - reglan PRN for nausea   - family and pt wishing to avoid PEG  - On Minced and moist diet   - case d/w daughter pleasure feeds as tolerated -  poss due to volume contraction and underlying ca   - afebrile   - CXR LT sided white no new infiltrate  - UA- positive with epithelial cells without symptoms and afebrile WBC decreasing off antibiotics

## 2021-11-10 NOTE — PROGRESS NOTE ADULT - SUBJECTIVE AND OBJECTIVE BOX
Patient is a 81y old  Female who presents with a chief complaint of Afib with RVR (2021 14:17)      SUBJECTIVE / OVERNIGHT EVENTS:  ADDITIONAL REVIEW OF SYSTEMS:    MEDICATIONS  (STANDING):  apixaban 2.5 milliGRAM(s) Oral every 12 hours  artificial  tears Solution 1 Drop(s) Both EYES four times a day  buDESOnide    Inhalation Suspension 0.5 milliGRAM(s) Inhalation daily  digoxin  Injectable 250 MICROGram(s) IV Push every 8 hours  fentaNYL   Patch  12 MICROgram(s)/Hr 1 Patch Transdermal every 72 hours  influenza  Vaccine (HIGH DOSE) 0.7 milliLiter(s) IntraMuscular once  levothyroxine 75 MICROGram(s) Oral daily  midodrine. 5 milliGRAM(s) Oral every 8 hours  pantoprazole  Injectable 40 milliGRAM(s) IV Push daily  sodium chloride 0.9%. 1000 milliLiter(s) (100 mL/Hr) IV Continuous <Continuous>    MEDICATIONS  (PRN):  acetaminophen     Tablet .. 650 milliGRAM(s) Oral every 6 hours PRN Mild Pain (1 - 3)  FIRST- Mouthwash  BLM 5 milliLiter(s) Swish and Spit every 6 hours PRN Mouth Care  hydrocodone/homatropine Syrup 5 milliLiter(s) Oral every 6 hours PRN Cough  metoclopramide Injectable 5 milliGRAM(s) IV Push three times a day PRN nausea  oxyCODONE    IR 5 milliGRAM(s) Oral every 6 hours PRN Moderate Pain (4 - 6)      CAPILLARY BLOOD GLUCOSE      POCT Blood Glucose.: 70 mg/dL (10 Nov 2021 07:39)  POCT Blood Glucose.: 85 mg/dL (2021 11:37)    I&O's Summary      PHYSICAL EXAM:  Vital Signs Last 24 Hrs  T(C): 36.3 (10 Nov 2021 09:25), Max: 36.6 (2021 18:15)  T(F): 97.4 (10 Nov 2021 09:25), Max: 97.8 (2021 18:15)  HR: 97 (10 Nov 2021 09:25) (71 - 132)  BP: 126/61 (10 Nov 2021 09:25) (99/60 - 126/65)  BP(mean): --  RR: 18 (10 Nov 2021 09:25) (16 - 18)  SpO2: 99% (10 Nov 2021 09:25) (97% - 100%)  CONSTITUTIONAL: NAD, well-developed, well-groomed  EYES: PERRLA; conjunctiva and sclera clear  ENMT: Moist oral mucosa, no pharyngeal injection or exudates; normal dentition  NECK: Supple, no palpable masses; no thyromegaly  RESPIRATORY: Normal respiratory effort; lungs are clear to auscultation bilaterally  CARDIOVASCULAR: Regular rate and rhythm, normal S1 and S2, no murmur/rub/gallop; No lower extremity edema; Peripheral pulses are 2+ bilaterally  ABDOMEN: Nontender to palpation, normoactive bowel sounds, no rebound/guarding; No hepatosplenomegaly  MUSCULOSKELETAL:  Normal gait; no clubbing or cyanosis of digits; no joint swelling or tenderness to palpation  PSYCH: A+O to person, place, and time; affect appropriate  NEUROLOGY: CN 2-12 are intact and symmetric; no gross sensory deficits   SKIN: No rashes; no palpable lesions    LABS:                        12.8   15.07 )-----------( 267      ( 10 Nov 2021 05:52 )             41.8     11-10    144  |  117<H>  |  28<H>  ----------------------------<  82  5.7<H>   |  12<L>  |  1.36<H>    Ca    8.2<L>      10 Nov 2021 05:52  Phos  3.9     11-10  Mg     2.20     11-10    TPro  5.4<L>  /  Alb  2.5<L>  /  TBili  0.4  /  DBili  x   /  AST  23  /  ALT  18  /  AlkPhos  106  11-10      CARDIAC MARKERS ( 10 Nov 2021 05:52 )  x     / x     / 94 U/L / x     / 3.8 ng/mL      Urinalysis Basic - ( 2021 16:43 )    Color: Yellow / Appearance: Clear / S.028 / pH: x  Gluc: x / Ketone: Small  / Bili: Small / Urobili: 3 mg/dL   Blood: x / Protein: 100 mg/dL / Nitrite: Negative   Leuk Esterase: Small / RBC: 7 /HPF / WBC 25 /HPF   Sq Epi: x / Non Sq Epi: 6 /HPF / Bacteria: Negative          RADIOLOGY & ADDITIONAL TESTS:  Results Reviewed: < from: Xray Chest 1 View- PORTABLE-Urgent (Xray Chest 1 View- PORTABLE-Urgent .) (21 @ 20:11) >  IMPRESSION:  No new focal opacity.  Right lung is clear.  Redemonstrated left upper lobe atelectasis/mass.    < end of copied text >  < from: US Kidney and Bladder (21 @ 13:11) >  IMPRESSION:    Sonographically normal kidneys. No hydronephrosis.  Incidentally noted trace ascites and right pleural effusion.    < end of copied text >      Imaging Personally Reviewed:  Electrocardiogram Personally Reviewed:    COORDINATION OF CARE:  Care Discussed with Consultants/Other Providers [Y/N]:  Prior or Outpatient Records Reviewed [Y/N]:   Patient is a 81y old  Female who presents with a chief complaint of Afib with RVR (2021 14:17)      SUBJECTIVE / OVERNIGHT EVENTS: 41 beats NSVT noted asymptomatic   ADDITIONAL REVIEW OF SYSTEMS:    MEDICATIONS  (STANDING):  apixaban 2.5 milliGRAM(s) Oral every 12 hours  artificial  tears Solution 1 Drop(s) Both EYES four times a day  buDESOnide    Inhalation Suspension 0.5 milliGRAM(s) Inhalation daily  digoxin  Injectable 250 MICROGram(s) IV Push every 8 hours  fentaNYL   Patch  12 MICROgram(s)/Hr 1 Patch Transdermal every 72 hours  influenza  Vaccine (HIGH DOSE) 0.7 milliLiter(s) IntraMuscular once  levothyroxine 75 MICROGram(s) Oral daily  midodrine. 5 milliGRAM(s) Oral every 8 hours  pantoprazole  Injectable 40 milliGRAM(s) IV Push daily  sodium chloride 0.9%. 1000 milliLiter(s) (100 mL/Hr) IV Continuous <Continuous>    MEDICATIONS  (PRN):  acetaminophen     Tablet .. 650 milliGRAM(s) Oral every 6 hours PRN Mild Pain (1 - 3)  FIRST- Mouthwash  BLM 5 milliLiter(s) Swish and Spit every 6 hours PRN Mouth Care  hydrocodone/homatropine Syrup 5 milliLiter(s) Oral every 6 hours PRN Cough  metoclopramide Injectable 5 milliGRAM(s) IV Push three times a day PRN nausea  oxyCODONE    IR 5 milliGRAM(s) Oral every 6 hours PRN Moderate Pain (4 - 6)      CAPILLARY BLOOD GLUCOSE      POCT Blood Glucose.: 70 mg/dL (10 Nov 2021 07:39)  POCT Blood Glucose.: 85 mg/dL (2021 11:37)    I&O's Summary      PHYSICAL EXAM:  Vital Signs Last 24 Hrs  T(C): 36.3 (10 Nov 2021 09:25), Max: 36.6 (2021 18:15)  T(F): 97.4 (10 Nov 2021 09:25), Max: 97.8 (2021 18:15)  HR: 97 (10 Nov 2021 09:25) (71 - 132)  BP: 126/61 (10 Nov 2021 09:25) (99/60 - 126/65)  BP(mean): --  RR: 18 (10 Nov 2021 09:25) (16 - 18)  SpO2: 99% (10 Nov 2021 09:25) (97% - 100%)  CONSTITUTIONAL: NAD, well-developed, well-groomed  EYES: PERRLA; conjunctiva and sclera clear  ENMT: Moist oral mucosa, no pharyngeal injection or exudates; normal dentition  NECK: Supple, no palpable masses; no thyromegaly  RESPIRATORY: Normal respiratory effort; lungs are clear to auscultation bilaterally  CARDIOVASCULAR: Regular rate and rhythm, normal S1 and S2, no murmur/rub/gallop; No lower extremity edema; Peripheral pulses are 2+ bilaterally  ABDOMEN: Nontender to palpation, normoactive bowel sounds, no rebound/guarding; No hepatosplenomegaly  MUSCULOSKELETAL:  Normal gait; no clubbing or cyanosis of digits; no joint swelling or tenderness to palpation  PSYCH: A+O to person, place, and time; affect appropriate  NEUROLOGY: CN 2-12 are intact and symmetric; no gross sensory deficits   SKIN: No rashes; no palpable lesions    LABS:                        12.8   15.07 )-----------( 267      ( 10 Nov 2021 05:52 )             41.8     11-10    144  |  117<H>  |  28<H>  ----------------------------<  82  5.7<H>   |  12<L>  |  1.36<H>    Ca    8.2<L>      10 Nov 2021 05:52  Phos  3.9     11-10  Mg     2.20     11-10    TPro  5.4<L>  /  Alb  2.5<L>  /  TBili  0.4  /  DBili  x   /  AST  23  /  ALT  18  /  AlkPhos  106  11-10      CARDIAC MARKERS ( 10 Nov 2021 05:52 )  x     / x     / 94 U/L / x     / 3.8 ng/mL      Urinalysis Basic - ( 2021 16:43 )    Color: Yellow / Appearance: Clear / S.028 / pH: x  Gluc: x / Ketone: Small  / Bili: Small / Urobili: 3 mg/dL   Blood: x / Protein: 100 mg/dL / Nitrite: Negative   Leuk Esterase: Small / RBC: 7 /HPF / WBC 25 /HPF   Sq Epi: x / Non Sq Epi: 6 /HPF / Bacteria: Negative          RADIOLOGY & ADDITIONAL TESTS:  Results Reviewed: < from: Xray Chest 1 View- PORTABLE-Urgent (Xray Chest 1 View- PORTABLE-Urgent .) (21 @ 20:11) >  IMPRESSION:  No new focal opacity.  Right lung is clear.  Redemonstrated left upper lobe atelectasis/mass.    < end of copied text >  < from: US Kidney and Bladder (21 @ 13:11) >  IMPRESSION:    Sonographically normal kidneys. No hydronephrosis.  Incidentally noted trace ascites and right pleural effusion.    < end of copied text >      Imaging Personally Reviewed:  Electrocardiogram Personally Reviewed:    COORDINATION OF CARE:  Care Discussed with Consultants/Other Providers [Y/N]:  Prior or Outpatient Records Reviewed [Y/N]:   Patient is a 81y old  Female who presents with a chief complaint of Afib with RVR (2021 14:17)      SUBJECTIVE / OVERNIGHT EVENTS: 41 beats NSVT noted asymptomatic. HR in 120's. + nausea and pain   ADDITIONAL REVIEW OF SYSTEMS: denies palpatation/CP    MEDICATIONS  (STANDING):  apixaban 2.5 milliGRAM(s) Oral every 12 hours  artificial  tears Solution 1 Drop(s) Both EYES four times a day  buDESOnide    Inhalation Suspension 0.5 milliGRAM(s) Inhalation daily  digoxin  Injectable 250 MICROGram(s) IV Push every 8 hours  fentaNYL   Patch  12 MICROgram(s)/Hr 1 Patch Transdermal every 72 hours  influenza  Vaccine (HIGH DOSE) 0.7 milliLiter(s) IntraMuscular once  levothyroxine 75 MICROGram(s) Oral daily  midodrine. 5 milliGRAM(s) Oral every 8 hours  pantoprazole  Injectable 40 milliGRAM(s) IV Push daily  sodium chloride 0.9%. 1000 milliLiter(s) (100 mL/Hr) IV Continuous <Continuous>    MEDICATIONS  (PRN):  acetaminophen     Tablet .. 650 milliGRAM(s) Oral every 6 hours PRN Mild Pain (1 - 3)  FIRST- Mouthwash  BLM 5 milliLiter(s) Swish and Spit every 6 hours PRN Mouth Care  hydrocodone/homatropine Syrup 5 milliLiter(s) Oral every 6 hours PRN Cough  metoclopramide Injectable 5 milliGRAM(s) IV Push three times a day PRN nausea  oxyCODONE    IR 5 milliGRAM(s) Oral every 6 hours PRN Moderate Pain (4 - 6)      CAPILLARY BLOOD GLUCOSE      POCT Blood Glucose.: 70 mg/dL (10 Nov 2021 07:39)  POCT Blood Glucose.: 85 mg/dL (2021 11:37)    I&O's Summary      PHYSICAL EXAM:  Vital Signs Last 24 Hrs  T(C): 36.3 (10 Nov 2021 09:25), Max: 36.6 (2021 18:15)  T(F): 97.4 (10 Nov 2021 09:25), Max: 97.8 (2021 18:15)  HR: 97 (10 Nov 2021 09:25) (71 - 132)  BP: 126/61 (10 Nov 2021 09:25) (99/60 - 126/65)  BP(mean): --  RR: 18 (10 Nov 2021 09:25) (16 - 18)  SpO2: 99% (10 Nov 2021 09:25) (97% - 100%)    CONSTITUTIONAL: NAD  EYES: PERRLA; conjunctiva and sclera clear  ENMT: Moist oral mucosa,   NECK: Supple, no palpable masses;   RESPIRATORY: Normal respiratory effort; lungs are clear to auscultation bilaterally  CARDIOVASCULAR: iregularly irregular s1/s2  ABDOMEN: Nontender to palpation, normoactive bowel sounds, no rebound/guarding;  MUSCULOSKELETAL:  no edema  PSYCH: A+O to person, place, and time; affect appropriate      LABS:                        12.8   15.07 )-----------( 267      ( 10 Nov 2021 05:52 )             41.8     11-10    144  |  117<H>  |  28<H>  ----------------------------<  82  5.7<H>   |  12<L>  |  1.36<H>    Ca    8.2<L>      10 Nov 2021 05:52  Phos  3.9     11-10  Mg     2.20     11-10    TPro  5.4<L>  /  Alb  2.5<L>  /  TBili  0.4  /  DBili  x   /  AST  23  /  ALT  18  /  AlkPhos  106  11-10      CARDIAC MARKERS ( 10 Nov 2021 05:52 )  x     / x     / 94 U/L / x     / 3.8 ng/mL      Urinalysis Basic - ( 2021 16:43 )    Color: Yellow / Appearance: Clear / S.028 / pH: x  Gluc: x / Ketone: Small  / Bili: Small / Urobili: 3 mg/dL   Blood: x / Protein: 100 mg/dL / Nitrite: Negative   Leuk Esterase: Small / RBC: 7 /HPF / WBC 25 /HPF   Sq Epi: x / Non Sq Epi: 6 /HPF / Bacteria: Negative          RADIOLOGY & ADDITIONAL TESTS:  Results Reviewed: < from: Xray Chest 1 View- PORTABLE-Urgent (Xray Chest 1 View- PORTABLE-Urgent .) (21 @ 20:11) >  IMPRESSION:  No new focal opacity.  Right lung is clear.  Redemonstrated left upper lobe atelectasis/mass.    < end of copied text >  < from: US Kidney and Bladder (21 @ 13:11) >  IMPRESSION:    Sonographically normal kidneys. No hydronephrosis.  Incidentally noted trace ascites and right pleural effusion.    < end of copied text >      Imaging Personally Reviewed:  Electrocardiogram Personally Reviewed:    COORDINATION OF CARE:  Care Discussed with Consultants/Other Providers [Y/N]:  Prior or Outpatient Records Reviewed [Y/N]:

## 2021-11-10 NOTE — PROGRESS NOTE ADULT - SUBJECTIVE AND OBJECTIVE BOX
Interval History:  Telemetry: AFib; intermittent episodes of rapid afib VR 120s lasting a few minutes otherwise mostly rate controlled - remains symptomatic   C/o nausea overnight     MEDICATIONS  (STANDING):  apixaban 2.5 milliGRAM(s) Oral every 12 hours  artificial  tears Solution 1 Drop(s) Both EYES four times a day  buDESOnide    Inhalation Suspension 0.5 milliGRAM(s) Inhalation daily  dextrose 5% + sodium chloride 0.9%. 1000 milliLiter(s) (100 mL/Hr) IV Continuous <Continuous>  digoxin  Injectable 250 MICROGram(s) IV Push every 8 hours  fentaNYL   Patch  12 MICROgram(s)/Hr 1 Patch Transdermal every 72 hours  influenza  Vaccine (HIGH DOSE) 0.7 milliLiter(s) IntraMuscular once  levothyroxine 75 MICROGram(s) Oral daily  midodrine. 5 milliGRAM(s) Oral every 8 hours  pantoprazole  Injectable 40 milliGRAM(s) IV Push daily  sodium chloride 0.9%. 1000 milliLiter(s) (100 mL/Hr) IV Continuous <Continuous>    MEDICATIONS  (PRN):  acetaminophen     Tablet .. 650 milliGRAM(s) Oral every 6 hours PRN Mild Pain (1 - 3)  FIRST- Mouthwash  BLM 5 milliLiter(s) Swish and Spit every 6 hours PRN Mouth Care  hydrocodone/homatropine Syrup 5 milliLiter(s) Oral every 6 hours PRN Cough  metoclopramide Injectable 5 milliGRAM(s) IV Push three times a day PRN nausea  oxyCODONE    IR 5 milliGRAM(s) Oral every 6 hours PRN Moderate Pain (4 - 6)    Vital Signs Last 24 Hrs  T(C): 36.3 (10 Nov 2021 09:25), Max: 36.6 (09 Nov 2021 18:15)  T(F): 97.4 (10 Nov 2021 09:25), Max: 97.8 (09 Nov 2021 18:15)  HR: 97 (10 Nov 2021 09:25) (71 - 132)  BP: 126/61 (10 Nov 2021 09:25) (99/60 - 126/65)  BP(mean): --  RR: 18 (10 Nov 2021 09:25) (16 - 18)  SpO2: 99% (10 Nov 2021 09:25) (97% - 100%)    Appearance: Normal	  HEENT:   Normal oral mucosa, PERRL, EOMI	  Lymphatic: No lymphadenopathy  Cardiovascular: No JVD, No murmurs, No edema  Respiratory: Lungs clear to auscultation	  Psychiatry: A & O x 3, Mood & affect appropriate  Gastrointestinal:  Soft, Non-tender, + BS	  Skin: No rashes, No ecchymoses, No cyanosis	  Neurologic: Non-focal  Extremities: Normal range of motion, No clubbing, cyanosis or edema  Vascular: Peripheral pulses palpable 2+ bilaterally    LABS:	 	    CBC Full  -  ( 10 Nov 2021 05:52 )  WBC Count : 15.07 K/uL  Hemoglobin : 12.8 g/dL  Hematocrit : 41.8 %  Platelet Count - Automated : 267 K/uL  Mean Cell Volume : 98.1 fL  Mean Cell Hemoglobin : 30.0 pg  Mean Cell Hemoglobin Concentration : 30.6 gm/dL  Auto Neutrophil # : x  Auto Lymphocyte # : x  Auto Monocyte # : x  Auto Eosinophil # : x  Auto Basophil # : x  Auto Neutrophil % : x  Auto Lymphocyte % : x  Auto Monocyte % : x  Auto Eosinophil % : x  Auto Basophil % : x    11-10    144  |  117<H>  |  28<H>  ----------------------------<  82  5.7<H>   |  12<L>  |  1.36<H>  11-09    142  |  115<H>  |  28<H>  ----------------------------<  93  4.7   |  11<L>  |  1.35<H>    Ca    8.2<L>      10 Nov 2021 05:52  Ca    7.9<L>      09 Nov 2021 08:20  Phos  3.9     11-10  Phos  4.3     11-09  Mg     2.20     11-10  Mg     2.20     11-09    TPro  5.4<L>  /  Alb  2.5<L>  /  TBili  0.4  /  DBili  x   /  AST  23  /  ALT  18  /  AlkPhos  106  11-10  TPro  5.4<L>  /  Alb  2.3<L>  /  TBili  0.4  /  DBili  x   /  AST  25  /  ALT  16  /  AlkPhos  108  11-09    LIVER FUNCTIONS - ( 10 Nov 2021 05:52 )  Alb: 2.5 g/dL / Pro: 5.4 g/dL / ALK PHOS: 106 U/L / ALT: 18 U/L / AST: 23 U/L / GGT: x                    Interval History:  Telemetry: AFib; intermittent episodes of rapid afib VR 120s lasting a few minutes otherwise mostly rate controlled - remains asymptomatic   C/o nausea overnight     MEDICATIONS  (STANDING):  apixaban 2.5 milliGRAM(s) Oral every 12 hours  artificial  tears Solution 1 Drop(s) Both EYES four times a day  buDESOnide    Inhalation Suspension 0.5 milliGRAM(s) Inhalation daily  dextrose 5% + sodium chloride 0.9%. 1000 milliLiter(s) (100 mL/Hr) IV Continuous <Continuous>  digoxin  Injectable 250 MICROGram(s) IV Push every 8 hours  fentaNYL   Patch  12 MICROgram(s)/Hr 1 Patch Transdermal every 72 hours  influenza  Vaccine (HIGH DOSE) 0.7 milliLiter(s) IntraMuscular once  levothyroxine 75 MICROGram(s) Oral daily  midodrine. 5 milliGRAM(s) Oral every 8 hours  pantoprazole  Injectable 40 milliGRAM(s) IV Push daily  sodium chloride 0.9%. 1000 milliLiter(s) (100 mL/Hr) IV Continuous <Continuous>    MEDICATIONS  (PRN):  acetaminophen     Tablet .. 650 milliGRAM(s) Oral every 6 hours PRN Mild Pain (1 - 3)  FIRST- Mouthwash  BLM 5 milliLiter(s) Swish and Spit every 6 hours PRN Mouth Care  hydrocodone/homatropine Syrup 5 milliLiter(s) Oral every 6 hours PRN Cough  metoclopramide Injectable 5 milliGRAM(s) IV Push three times a day PRN nausea  oxyCODONE    IR 5 milliGRAM(s) Oral every 6 hours PRN Moderate Pain (4 - 6)    Vital Signs Last 24 Hrs  T(C): 36.3 (10 Nov 2021 09:25), Max: 36.6 (09 Nov 2021 18:15)  T(F): 97.4 (10 Nov 2021 09:25), Max: 97.8 (09 Nov 2021 18:15)  HR: 97 (10 Nov 2021 09:25) (71 - 132)  BP: 126/61 (10 Nov 2021 09:25) (99/60 - 126/65)  BP(mean): --  RR: 18 (10 Nov 2021 09:25) (16 - 18)  SpO2: 99% (10 Nov 2021 09:25) (97% - 100%)    Appearance: Normal	  HEENT:   Normal oral mucosa, PERRL, EOMI	  Lymphatic: No lymphadenopathy  Cardiovascular: No JVD, No murmurs, No edema  Respiratory: Lungs clear to auscultation	  Psychiatry: A & O x 3, Mood & affect appropriate  Gastrointestinal:  Soft, Non-tender, + BS	  Skin: No rashes, No ecchymoses, No cyanosis	  Neurologic: Non-focal  Extremities: Normal range of motion, No clubbing, cyanosis or edema  Vascular: Peripheral pulses palpable 2+ bilaterally    LABS:	 	    CBC Full  -  ( 10 Nov 2021 05:52 )  WBC Count : 15.07 K/uL  Hemoglobin : 12.8 g/dL  Hematocrit : 41.8 %  Platelet Count - Automated : 267 K/uL  Mean Cell Volume : 98.1 fL  Mean Cell Hemoglobin : 30.0 pg  Mean Cell Hemoglobin Concentration : 30.6 gm/dL  Auto Neutrophil # : x  Auto Lymphocyte # : x  Auto Monocyte # : x  Auto Eosinophil # : x  Auto Basophil # : x  Auto Neutrophil % : x  Auto Lymphocyte % : x  Auto Monocyte % : x  Auto Eosinophil % : x  Auto Basophil % : x    11-10    144  |  117<H>  |  28<H>  ----------------------------<  82  5.7<H>   |  12<L>  |  1.36<H>  11-09    142  |  115<H>  |  28<H>  ----------------------------<  93  4.7   |  11<L>  |  1.35<H>    Ca    8.2<L>      10 Nov 2021 05:52  Ca    7.9<L>      09 Nov 2021 08:20  Phos  3.9     11-10  Phos  4.3     11-09  Mg     2.20     11-10  Mg     2.20     11-09    TPro  5.4<L>  /  Alb  2.5<L>  /  TBili  0.4  /  DBili  x   /  AST  23  /  ALT  18  /  AlkPhos  106  11-10  TPro  5.4<L>  /  Alb  2.3<L>  /  TBili  0.4  /  DBili  x   /  AST  25  /  ALT  16  /  AlkPhos  108  11-09    LIVER FUNCTIONS - ( 10 Nov 2021 05:52 )  Alb: 2.5 g/dL / Pro: 5.4 g/dL / ALK PHOS: 106 U/L / ALT: 18 U/L / AST: 23 U/L / GGT: x

## 2021-11-10 NOTE — PROGRESS NOTE ADULT - SUBJECTIVE AND OBJECTIVE BOX
HPI:  81F Hx hypothyroidism, GERD, HTN,  asthma, afib w/ Eliquis, metoprolol, Cardizem, NSCLC (2017) now on 4th line chemo: Venofer/ Gemzar  (10/6) p/w worsening difficulty with eating x weeks. Palliative Medicine was consulted for complex medical decision making related to goals of care discussions    =======================================================  11/10/2021    - Chart reviewed. Patient seen and examined. Daughter at bedside.   - Pt c/o feeling bad overall today. Has baseline nausea but worse now. Also feels tired.     =======================================================  ----- SYMPTOM ASSESSMENT:   [ ]Unable to obtain due to poor mentation: information obtained from team/ contact    PAIN ASSESSMENT: DENIED  Site-   Onset-   Character-   Radiation-   Associated symptoms-   Timing and duration-   Exacerbating factors  Severity-     Effect on QOL- SIGNIFICANTLY INTERFERES WITH ADL'S  PAIN AD Score: 0    Dyspnea:  Yes [ ] No [X ] - [ ]Mild [ ]Moderate [ ]Severe  Anxiety:    Yes [ ] No [ X] - [ ]Mild [ ]Moderate [ ]Severe  Fatigue:    Yes [ ] No [X ] - [ ]Mild [ ]Moderate [ ]Severe  Nausea:    Yes [ ] No [ X] - [ ]Mild [ ]Moderate [ ]Severe                         Loss of appetite: Yes [ ] No [X ] - [ ]Mild [ ]Moderate [ ]Severe             Constipation:  Yes [ ] No [X ] - [ ]Mild [ ]Moderate [ ]Severe  Grief: Yes [ ] No [X ]     [X ]All other review of systems negative, including: weakness, cough, colds, blurry vision, headaches, dysuria, pruritus, palpitations, muscle cramps, easy bruising, epistaxis, rashes    =======================================================  ----- PERTINENT PMH/ SXH/ FHX  Hypertension    Asthma    GERD (gastroesophageal reflux disease)    Hypothyroidism    Other nonspecific abnormal finding of lung field    Lung cancer    Paralysis of vocal cords    Atrial flutter    Atrial fibrillation      No significant past surgical history    History of lung biopsy    H/O colonoscopy    H/O vocal cord paralysis    History of laryngoscopy      FAMILY HISTORY:  Family history of cancer (Sibling)        ----- SOCIAL HISTORY:   [ ] Unable to elicit  Significant other/partner:    Children:   Faith/Spirituality:  Substance hx: Yes[ ]  No [ ]   Tobacco hx:  Yes [ ] No [ ]   Alcohol hx: Yes [ ] No [ ]   Home Opioid hx:  [ ] I-Stop Reference No:  Living Situation: [ ]Home  [ ]Long term care  [ ]Rehab [ ]Other    ----- ADVANCE DIRECTIVES:    DNR  MOLST  [ ]  Living Will  [ ]   DECISION MAKER(s):  [ ] Health Care Proxy(s)  [ ] Surrogate(s)  [ ] Guardian           Name(s): Phone Number(s):  DR. KARLO DAVALOS (DAUGHTER) @ [phone]    ----- BASELINE (I)ADL(s) (prior to admission):  [place]: [ ]Total  [ ] Moderate [ ]Dependent  Palliative Performance Status Version 2:   60     =======================================================  -----MEDICATIONS AND ALLERGIES:  Allergies    No Known Allergies    Intolerances    MEDICATIONS  (STANDING):  apixaban 2.5 milliGRAM(s) Oral every 12 hours  artificial  tears Solution 1 Drop(s) Both EYES four times a day  buDESOnide    Inhalation Suspension 0.5 milliGRAM(s) Inhalation daily  dextrose 5% + sodium chloride 0.9%. 1000 milliLiter(s) (100 mL/Hr) IV Continuous <Continuous>  digoxin  Injectable 250 MICROGram(s) IV Push every 8 hours  fentaNYL   Patch  12 MICROgram(s)/Hr 1 Patch Transdermal every 72 hours  influenza  Vaccine (HIGH DOSE) 0.7 milliLiter(s) IntraMuscular once  levothyroxine 75 MICROGram(s) Oral daily  midodrine. 5 milliGRAM(s) Oral every 8 hours  pantoprazole  Injectable 40 milliGRAM(s) IV Push daily    MEDICATIONS  (PRN):  acetaminophen     Tablet .. 650 milliGRAM(s) Oral every 6 hours PRN Mild Pain (1 - 3)  FIRST- Mouthwash  BLM 5 milliLiter(s) Swish and Spit every 6 hours PRN Mouth Care  hydrocodone/homatropine Syrup 5 milliLiter(s) Oral every 6 hours PRN Cough  metoclopramide Injectable 5 milliGRAM(s) IV Push three times a day PRN nausea  oxyCODONE    IR 5 milliGRAM(s) Oral every 6 hours PRN Moderate Pain (4 - 6)      =======================================================  ----- PHYSICAL EXAM:  Vital Signs Last 24 Hrs  T(C): 36.3 (10 Nov 2021 09:25), Max: 36.6 (09 Nov 2021 18:15)  T(F): 97.4 (10 Nov 2021 09:25), Max: 97.8 (09 Nov 2021 18:15)  HR: 97 (10 Nov 2021 09:25) (97 - 132)  BP: 126/61 (10 Nov 2021 09:25) (100/59 - 126/65)  BP(mean): --  RR: 18 (10 Nov 2021 09:25) (17 - 18)  SpO2: 99% (10 Nov 2021 09:25) (97% - 100%)    GEN: Awake, LETHARGIC, NAD  HEAD: Normocephalic and atraumatic,   EYES: Anicteric sclerae, EOM's grossly intact  NECK: No JVD, no thyromegaly  PULM: Comfortable work of breathing, clear BS  CV: Pulses 2+ symmetric bilaterally, regular rate and rhythm  ABD: Not distended, soft, non-tender,   EXT: No edema, No deformities  PSYCH: Appropriate mood and affect, no suicidal ideations elicited  NEURO: No facial asymmetry, no tremors, no observed movement disorders  SKIN: No rashes or lesions on exposed skin, No jaundice    =======================================================  ----- LABS:                        11.9   x     )-----------( 257      ( 08 Nov 2021 14:17 )             37.4   11-08    144  |  112<H>  |  x   ----------------------------<  x   3.7   |  x   |  x     Ca    8.4      07 Nov 2021 07:14  Phos  3.1     11-07  Mg     2.30     11-08

## 2021-11-11 NOTE — PROGRESS NOTE ADULT - PROBLEM SELECTOR PLAN 1
- Known Afib; had afib w/ rvr   - s/p 120mg diltiazem and metoprolol succinate 100mg-- with bradycardia cards rec avoid ca channel blocker  - EKG showed Afib QTc: 464  - CT A/P on admission showing pericardial effusion   - digoxin load as BP borderline with midodrine and digoxin 125 mcg qd   - family wishing to avoid invasive procedures will cancel TTE   - off digoxin after discussion with EP

## 2021-11-11 NOTE — PROGRESS NOTE ADULT - PROBLEM SELECTOR PLAN 6
- NSCLC on 4th line chemotherapy   - unable to really take much PO, soft foods and soup with abd pain and pressure   - Case d./w Dr. Olivares at bedside given failure of multiple lines of chemo currently given functional status not a candidate for chemo. outlook does not look good understands and her and family considering DNR/DNI and hospice after discussion with palliative.  - Now DNR/DNI no pressors, comfort care  - pain control with oxycodone PO  - referred to home hospice

## 2021-11-11 NOTE — PROGRESS NOTE ADULT - PROBLEM SELECTOR PLAN 3
-  poss due to volume contraction and underlying ca   - afebrile   - CXR LT sided white no new infiltrate  - UA- positive with epithelial cells without symptoms and afebrile WBC decreasing off antibiotics  - no further labs family opting for comfort - K 5.7 hemolyzed   - lokalema x 1  - no further labs as comfort

## 2021-11-11 NOTE — HOSPICE CARE NOTE - CONVESATION DETAILS
UNABLE TO ACCEPT CASE. [facility] is not going to be able to take this case on due to inadequate care giving. This patient is newly mostly bed-bound and took a rapid decline while in hospital (walking before admission to hospital) and 81 year old  is insisting that he can care for her on his own, despite his daughters telling him he won't be able to. [facility] also reports that  often refuses care in the home for patient as they do not want strangers/any additional people besides family in home. The family (3 daughters) cannot commit to go there every day and help care for patient. SW provided private hire list but they want to wait and see if they can get more hrs from Medicaid (they only have 12hrs/week now and  doesn’t always let them in). I communicated this to SUKHWINDER WILSON and suggested home care referral. [facility] team also aware.   Iveth Barajas LMSW  [phone]

## 2021-11-11 NOTE — PROGRESS NOTE ADULT - PROBLEM SELECTOR PLAN 5
- JOSE G due to poor PO tintake  - Fe urea<35% consistent with pre renal state  - bladder scan with 79 ml  - cont IVF  - renal US neg hydro  - Case d/w Daughter over telephone since goal is comfort will avoid labs

## 2021-11-11 NOTE — PROGRESS NOTE ADULT - PROBLEM SELECTOR PLAN 4
- NSCLC on 4th line chemotherapy   - unable to really take much PO, soft foods and soup with abd pain and pressure   - first blm mouthwash and hicodine for cough  - NS @ 100 ml/h  - family and pt wishing to avoid PEG  - On Minced and moist diet   - case d/w daughter pleasure feeds as tolerated  - appreciate palliaitve recs reglan standing with PRN zofran no ativan for nausea  - home hospice -  poss due to volume contraction and underlying ca   - afebrile   - CXR LT sided white no new infiltrate  - UA- positive with epithelial cells without symptoms and afebrile WBC decreasing off antibiotics  - no further labs family opting for comfort

## 2021-11-11 NOTE — PROGRESS NOTE ADULT - PROBLEM SELECTOR PLAN 2
- K 5.7 hemolyzed   - lokalema x 1  - no further labs as comfort - NSCLC on 4th line chemotherapy   - unable to really take much PO, soft foods and soup with abd pain and pressure   - first blm mouthwash and hicodine for cough  - NS @ 100 ml/h  - family and pt wishing to avoid PEG  - On Minced and moist diet   - case d/w daughter pleasure feeds as tolerated  - appreciate palliaitve recs reglan standing with PRN zofran no ativan for nausea  - case d/w pallaitive increase reglan suspect nausea due to fentanyl would hold off on steroid now   - home hospice

## 2021-11-11 NOTE — PROGRESS NOTE ADULT - SUBJECTIVE AND OBJECTIVE BOX
HPI:  81F Hx hypothyroidism, GERD, HTN,  asthma, afib w/ Eliquis, metoprolol, Cardizem, NSCLC (2017) now on 4th line chemo: Venofer/ Gemzar  (10/6) p/w worsening difficulty with eating x weeks. Palliative Medicine was consulted for complex medical decision making related to goals of care discussions    =======================================================  11/11/2021    - Chart reviewed. Patient seen and examined.  [id number]  - Reported nausea improving  - No pain    =======================================================  ----- SYMPTOM ASSESSMENT:   [ ]Unable to obtain due to poor mentation: information obtained from team/ contact    PAIN ASSESSMENT: DENIED  Site-   Onset-   Character-   Radiation-   Associated symptoms-   Timing and duration-   Exacerbating factors  Severity-     Effect on QOL- SIGNIFICANTLY INTERFERES WITH ADL'S  PAIN AD Score: 0    Dyspnea:  Yes [ ] No [X ] - [ ]Mild [ ]Moderate [ ]Severe  Anxiety:    Yes [ ] No [ X] - [ ]Mild [ ]Moderate [ ]Severe  Fatigue:    Yes [ ] No [X ] - [ ]Mild [ ]Moderate [ ]Severe  Nausea:    Yes [ ] No [ X] - [ ]Mild [ ]Moderate [ ]Severe                         Loss of appetite: Yes [ ] No [X ] - [ ]Mild [ ]Moderate [ ]Severe             Constipation:  Yes [ ] No [X ] - [ ]Mild [ ]Moderate [ ]Severe  Grief: Yes [ ] No [X ]     [X ]All other review of systems negative, including: weakness, cough, colds, blurry vision, headaches, dysuria, pruritus, palpitations, muscle cramps, easy bruising, epistaxis, rashes    =======================================================  ----- PERTINENT PMH/ SXH/ FHX  Hypertension    Asthma    GERD (gastroesophageal reflux disease)    Hypothyroidism    Other nonspecific abnormal finding of lung field    Lung cancer    Paralysis of vocal cords    Atrial flutter    Atrial fibrillation      No significant past surgical history    History of lung biopsy    H/O colonoscopy    H/O vocal cord paralysis    History of laryngoscopy      FAMILY HISTORY:  Family history of cancer (Sibling)        ----- SOCIAL HISTORY:   [ ] Unable to elicit  Significant other/partner:    Children:   Yazidi/Spirituality:  Substance hx: Yes[ ]  No [ ]   Tobacco hx:  Yes [ ] No [ ]   Alcohol hx: Yes [ ] No [ ]   Home Opioid hx:  [ ] I-Stop Reference No:  Living Situation: [ ]Home  [ ]Long term care  [ ]Rehab [ ]Other    ----- ADVANCE DIRECTIVES:    DNR  MOLST  [ ]  Living Will  [ ]   DECISION MAKER(s):  [ ] Health Care Proxy(s)  [ ] Surrogate(s)  [ ] Guardian           Name(s): Phone Number(s):  DR. SCALESONJEVON DAVALOS (DAUGHTER) @ [phone]    ----- BASELINE (I)ADL(s) (prior to admission):  [place]: [ ]Total  [ ] Moderate [ ]Dependent  Palliative Performance Status Version 2:   60     =======================================================  -----MEDICATIONS AND ALLERGIES:  Allergies    No Known Allergies    Intolerances    MEDICATIONS  (STANDING):  apixaban 2.5 milliGRAM(s) Oral every 12 hours  artificial  tears Solution 1 Drop(s) Both EYES four times a day  buDESOnide    Inhalation Suspension 0.5 milliGRAM(s) Inhalation daily  dextrose 5% + sodium chloride 0.9%. 1000 milliLiter(s) (60 mL/Hr) IV Continuous <Continuous>  influenza  Vaccine (HIGH DOSE) 0.7 milliLiter(s) IntraMuscular once  levothyroxine 75 MICROGram(s) Oral daily  metoclopramide 10 milliGRAM(s) Oral every 8 hours  midodrine. 5 milliGRAM(s) Oral every 8 hours  pantoprazole  Injectable 40 milliGRAM(s) IV Push daily    MEDICATIONS  (PRN):  acetaminophen     Tablet .. 650 milliGRAM(s) Oral every 6 hours PRN Mild Pain (1 - 3)  FIRST- Mouthwash  BLM 5 milliLiter(s) Swish and Spit every 6 hours PRN Mouth Care  hydrocodone/homatropine Syrup 5 milliLiter(s) Oral every 6 hours PRN Cough  ondansetron   Disintegrating Tablet 8 milliGRAM(s) Oral every 6 hours PRN Nausea and/or Vomiting  oxyCODONE    Solution 5 milliGRAM(s) Oral every 3 hours PRN pain      =======================================================  ----- PHYSICAL EXAM:  Vital Signs Last 24 Hrs  T(C): 36.3 (11 Nov 2021 05:46), Max: 36.3 (10 Nov 2021 22:25)  T(F): 97.4 (11 Nov 2021 05:46), Max: 97.4 (10 Nov 2021 22:25)  HR: 80 (11 Nov 2021 09:16) (77 - 102)  BP: 134/70 (11 Nov 2021 05:46) (103/58 - 139/69)  BP(mean): --  RR: 18 (11 Nov 2021 05:46) (18 - 18)  SpO2: 99% (11 Nov 2021 09:16) (98% - 99%)    GEN: Awake, LETHARGIC, NAD  HEAD: Normocephalic and atraumatic,   EYES: Anicteric sclerae, EOM's grossly intact  NECK: No JVD, no thyromegaly  PULM: Comfortable work of breathing, clear BS  CV: Pulses 2+ symmetric bilaterally, regular rate and rhythm  ABD: Not distended, soft, non-tender,   EXT: No edema, No deformities  PSYCH: Appropriate mood and affect, no suicidal ideations elicited  NEURO: No facial asymmetry, no tremors, no observed movement disorders  SKIN: No rashes or lesions on exposed skin, No jaundice    =======================================================  ----- LABS:                    11-10    144  |  117<H>  |  28<H>  ----------------------------<  82  5.7<H>   |  12<L>  |  1.36<H>    Ca    8.2<L>      10 Nov 2021 05:52  Phos  3.9     11-10  Mg     2.20     11-10    TPro  5.4<L>  /  Alb  2.5<L>  /  TBili  0.4  /  DBili  x   /  AST  23  /  ALT  18  /  AlkPhos  106  11-10      ************************************************************************  PALLIATIVE MEDICINE COORDINATION OF CARE DOCUMENTATION  [x] Inpatient Consult  [ ] Other:  ************************************************************************  ------------------------------------------------------------------------  COORDINATION OF CARE:  --- Palliative Care consulted for: [facility]  --- Patient assessed: 11/11  --- Patient previously seen by Palliative Care service: NO  ADVANCE CARE PLANNING  --- Code status: DNR, DNI  --- MOLST reviewed in chart:   --- HCP/ Surrogate: ELLE DAVALOS ([email or web address])  --- [facility] document found in [facility]: NONE  --- HCP/ Living will/ Other advanced directives in Alpha: NONE  ------------------------------------------------------------------------  CARE PROVIDER DOCUMENTATION:  --- Discussed with Medicine attending  --- CARDS: No need for digoxin  --- [facility]: D/c held til family can hire [facility]  ------------------------------------------------------------------------  --- Chart reviewed: 310 Minutes [including time used to gather, review and transfer data to this note]  --- Start: 11:30  --- End: 12:00  Prolonged services rendered, as part of this patient's care provided by Palliative Medicine, include: i.chart review for provider and ancillary service documentation, ii.pertinent diagnostics including laboratory and imaging studies,iii. medication review including PRN use, iv. admission history including previous palliative care encounters and GOC notes, v.advance care planning documents including HCP and MOLST forms in [facility]. Part of Palliative Medicine extended evaluation and management also involves coordination of care with our IDT, the primary and consulting yajaira, and unit CM/SW and Hospice if eligible. Recommendations based on the information gathered and discussed are outline in the AP of our notes.    ************************************************************************

## 2021-11-11 NOTE — PROGRESS NOTE ADULT - ASSESSMENT
81F Hx hypothyroidism, GERD, HTN,  asthma, afib w/ Eliquis, metoprolol, Cardizem, NSCLC (2017) now on 4th line chemo: Venofer/ Gemzar  (10/6) p/w worsening difficulty with eating x weeks. Palliative Medicine was consulted for complex medical decision making related to goals of care discussions    ------------------------------  # NAUSEA  - Improved: likely because Fentanly d/c'd ~6pm yesterday. Still with lingering effects.   - Increase Reglan 10mg PO q8h ATC  - Zofran 4mg every 8 hours ATC to stagger with Reglan  - If still with nausea, will consider Dex 4mg PO BID. Wary about polyphagic response  - Do NOT give Ativan for nausea    # PAIN  - Will keep on Oxycodone solution 5mg PO q3h PRN pain    # DYSPHAGIA  - Vocal cord paralysis  - On Minced and moist diet  - Keep upright. Aspiration precautions  - 11/9: Family decided no PEG tube    # FAILURE TO THRIVE  - Advanced cancer + poor PO intake + debility (PPSV 60)  - 11/9: Family decided no PEG tube    # LUNG CANCER  - Not a candidate for DMT due to poor nutrition  - Hospice appropriate    # ADVANCE CARE PLANNING  - CODE: DNR/ DNI (11/9/21)  - HCP: Dr. Mat Perera (daughter)  - HOSPICE ELIGIBLE: Yes. Hospice referral made  - GOALS:  1) No PEG  2) DNR, DNI  3) Hospice referral:

## 2021-11-12 NOTE — PROGRESS NOTE ADULT - ASSESSMENT
81F Hx hypothyroidism, GERD, HTN,  asthma, afib w/ Eliquis &  metoprolol + Cardizem, lung cancer s/p chemo and radiation & immunotherapy 02/2019 but currently getting Venofer/ Gemzar last dose 10/6 p/w worsening difficulty with eating x weeks admitted for Afib w/ rvr and FTT now DNR/DNI.

## 2021-11-12 NOTE — CHART NOTE - NSCHARTNOTEFT_GEN_A_CORE
LATE ENTRY NOTE: Discussed case with medical attending. Discontinued midodrine per recommendations as BP currently stable. Team to continue to monitor.

## 2021-11-12 NOTE — DISCHARGE NOTE PROVIDER - NSDCFUADDAPPT_GEN_ALL_CORE_FT
Continue your medications as directed and please follow-up as an outpatient with your primary care provider for further care and recommendations.

## 2021-11-12 NOTE — PROGRESS NOTE ADULT - PROVIDER SPECIALTY LIST ADULT
Electrophysiology
Electrophysiology
Heme/Onc
Palliative Care
Cardiology
Palliative Care
Hospitalist

## 2021-11-12 NOTE — DISCHARGE NOTE PROVIDER - NSDCCPCAREPLAN_GEN_ALL_CORE_FT
PRINCIPAL DISCHARGE DIAGNOSIS  Diagnosis: Lung cancer  Assessment and Plan of Treatment: You were seen by oncology team and palliative team during your hospital stay. Per oncology patient not a candidate for chemo. After goal of care conversation family wanting comfort care.   Follow up with Dr. Liang as outpatient.      SECONDARY DISCHARGE DIAGNOSES  Diagnosis: Leukocytosis  Assessment and Plan of Treatment: Lekocytosis possibly reactive vs due to underlying cancer  After goal of care conversation family wanting comfort care.   Follow up with Dr. Liang as outpatient.    Diagnosis: Hyperkalemia  Assessment and Plan of Treatment: - lokalema x 1  - no further labs as comfort.  Follow up with Dr. Liang as outpatient.    Diagnosis: Constipation  Assessment and Plan of Treatment: Continue with miralax, senna, and duclolax suppositories as needed for constipation.    Diagnosis: Elevated serum creatinine  Assessment and Plan of Treatment: - loza placed 11/12  - After goal of care conversation family wanting comfort care.   Follow up with Dr. Liang as outpatient.      Diagnosis: Asthma  Assessment and Plan of Treatment: - c/w budesonide and albuterol as needed for shortness of breath    Diagnosis: Hypothyroidism  Assessment and Plan of Treatment: Continue with synthroid 75mcg.      Diagnosis: Atrial fibrillation with RVR  Assessment and Plan of Treatment: Eliquis was discontinued. Follow up with Dr. Liang as outpatient.    Diagnosis: Lung cancer  Assessment and Plan of Treatment: You were seen by oncology team and palliative team during your hospital stay. Per oncology patient not a candidate for chemo. After goal of care conversation family wanting comfort care.   Follow up with Dr. Liang as outpatient.

## 2021-11-12 NOTE — PROGRESS NOTE ADULT - PROBLEM SELECTOR PLAN 10
DVT PPx: On eliquis  Diet: Regular  Code: DNR/DNI  Dispo: Home w/ hospice
- pantoprozole 40mg qd
DVT PPx: On eliquis  Diet: Regular  Code: DNR/DNI  Dispo: Home w/ hospice

## 2021-11-12 NOTE — CHART NOTE - NSCHARTNOTEFT_GEN_A_CORE
Discussed with medical attending. Discontinued Eliquis per recommendations. Team to continue to monitor. Discussed with medical attending. Patient to be discharged to home today. Discontinued Eliquis per recommendations.

## 2021-11-12 NOTE — PROGRESS NOTE ADULT - PROBLEM SELECTOR PLAN 3
- Known Afib; had afib w/ rvr   - s/p 120mg diltiazem and metoprolol succinate 100mg-- with bradycardia cards rec avoid ca channel blocker  - EKG showed Afib QTc: 464  - CT A/P on admission showing pericardial effusion   - digoxin load as BP borderline with midodrine and digoxin 125 mcg qd   - family wishing to avoid invasive procedures will cancel TTE   - BP stable discontinued midodrine   - off digoxin after discussion with EP

## 2021-11-12 NOTE — PROGRESS NOTE ADULT - ASSESSMENT
81F Hx hypothyroidism, GERD, HTN,  asthma, afib w/ Eliquis, metoprolol, Cardizem, NSCLC (2017) now on 4th line chemo: Venofer/ Gemzar  (10/6) p/w worsening difficulty with eating x weeks. Palliative Medicine was consulted for complex medical decision making related to goals of care discussions    ------------------------------  # NAUSEA  - Improving. Likely fentanyl effect.   - Reglan 10mg PO q8h ATC  - Zofran 4mg every 8 hours ATC to stagger with Reglan  - If still with nausea, will consider Dex 4mg PO BID. Wary about polyphagic response  - Do NOT give Ativan for nausea    # PAIN  - Will keep on Oxycodone solution 5mg PO q3h PRN pain  - Urinary retention. Bladder scan + straight cath if needed    # DYSPHAGIA  - Vocal cord paralysis  - On Minced and moist diet  - Keep upright. Aspiration precautions  - 11/9: Family decided no PEG tube    # FAILURE TO THRIVE  - Advanced cancer + poor PO intake + debility (PPSV 60)  - 11/9: Family decided no PEG tube    # LUNG CANCER  - Not a candidate for DMT due to poor nutrition  - Hospice appropriate    # ADVANCE CARE PLANNING  - CODE: DNR/ DNI (11/9/21)  - HCP: Dr. Mat Perera (daughter)  - HOSPICE ELIGIBLE: Yes. Hospice referral made  - GOALS:  1) No PEG  2) DNR, DNI  3) Hospice referral made

## 2021-11-12 NOTE — PROGRESS NOTE ADULT - SUBJECTIVE AND OBJECTIVE BOX
HPI:  81F Hx hypothyroidism, GERD, HTN,  asthma, afib w/ Eliquis, metoprolol, Cardizem, NSCLC (2017) now on 4th line chemo: Venofer/ Gemzar  (10/6) p/w worsening difficulty with eating x weeks. Palliative Medicine was consulted for complex medical decision making related to goals of care discussions    =======================================================  11/12/2021    - Chart reviewed. Patient seen and examined.  [id number]  - Lower abd pain 2/2 urinary bladder fullness. "Can't pee". D/w ACP  - Otherwise, no other pain. Nausea improving    =======================================================  ----- SYMPTOM ASSESSMENT:   [ ]Unable to obtain due to poor mentation: information obtained from team/ contact    PAIN ASSESSMENT:   Site-   Onset-   Character-   Radiation-   Associated symptoms-   Timing and duration-   Exacerbating factors  Severity-     Effect on QOL- SIGNIFICANTLY INTERFERES WITH ADL'S  PAIN AD Score: 0    Dyspnea:  Yes [ ] No [X ] - [ ]Mild [ ]Moderate [ ]Severe  Anxiety:    Yes [ ] No [ X] - [ ]Mild [ ]Moderate [ ]Severe  Fatigue:    Yes [ ] No [X ] - [ ]Mild [ ]Moderate [ ]Severe  Nausea:    Yes [ ] No [ X] - [ ]Mild [ ]Moderate [ ]Severe                         Loss of appetite: Yes [ ] No [X ] - [ ]Mild [ ]Moderate [ ]Severe             Constipation:  Yes [ ] No [X ] - [ ]Mild [ ]Moderate [ ]Severe  Grief: Yes [ ] No [X ]     [X ]All other review of systems negative, including: weakness, cough, colds, blurry vision, headaches, dysuria, pruritus, palpitations, muscle cramps, easy bruising, epistaxis, rashes    =======================================================  ----- PERTINENT PMH/ SXH/ FHX  Hypertension    Asthma    GERD (gastroesophageal reflux disease)    Hypothyroidism    Other nonspecific abnormal finding of lung field    Lung cancer    Paralysis of vocal cords    Atrial flutter    Atrial fibrillation      No significant past surgical history    History of lung biopsy    H/O colonoscopy    H/O vocal cord paralysis    History of laryngoscopy      FAMILY HISTORY:  Family history of cancer (Sibling)        ----- SOCIAL HISTORY:   [ ] Unable to elicit  Significant other/partner:    Children:   Worship/Spirituality:  Substance hx: Yes[ ]  No [ ]   Tobacco hx:  Yes [ ] No [ ]   Alcohol hx: Yes [ ] No [ ]   Home Opioid hx:  [ ] I-Stop Reference No:  Living Situation: [ ]Home  [ ]Long term care  [ ]Rehab [ ]Other    ----- ADVANCE DIRECTIVES:    DNR  MOLST  [ ]  Living Will  [ ]   DECISION MAKER(s):  [ ] Health Care Proxy(s)  [ ] Surrogate(s)  [ ] Guardian           Name(s): Phone Number(s):  DR. KAROL DAVALOS (DAUGHTER) @ [phone]    ----- BASELINE (I)ADL(s) (prior to admission):  [place]: [ ]Total  [ ] Moderate [ ]Dependent  Palliative Performance Status Version 2:   60     =======================================================  -----MEDICATIONS AND ALLERGIES:  Allergies    No Known Allergies    Intolerances    MEDICATIONS  (STANDING):  apixaban 2.5 milliGRAM(s) Oral every 12 hours  artificial  tears Solution 1 Drop(s) Both EYES four times a day  bisacodyl Suppository 10 milliGRAM(s) Rectal once  buDESOnide    Inhalation Suspension 0.5 milliGRAM(s) Inhalation daily  dextrose 5% + sodium chloride 0.9%. 1000 milliLiter(s) (60 mL/Hr) IV Continuous <Continuous>  influenza  Vaccine (HIGH DOSE) 0.7 milliLiter(s) IntraMuscular once  lactulose Syrup 10 Gram(s) Oral once  levothyroxine 75 MICROGram(s) Oral daily  metoclopramide 10 milliGRAM(s) Oral every 8 hours  ondansetron    Tablet 4 milliGRAM(s) Oral every 8 hours  pantoprazole  Injectable 40 milliGRAM(s) IV Push daily    MEDICATIONS  (PRN):  acetaminophen     Tablet .. 650 milliGRAM(s) Oral every 6 hours PRN Mild Pain (1 - 3)  FIRST- Mouthwash  BLM 5 milliLiter(s) Swish and Spit every 6 hours PRN Mouth Care  hydrocodone/homatropine Syrup 5 milliLiter(s) Oral every 6 hours PRN Cough  oxyCODONE    Solution 5 milliGRAM(s) Oral every 3 hours PRN pain        =======================================================  ----- PHYSICAL EXAM:  Vital Signs Last 24 Hrs  T(C): 36.8 (12 Nov 2021 10:10), Max: 36.8 (11 Nov 2021 21:17)  T(F): 98.2 (12 Nov 2021 10:10), Max: 98.2 (11 Nov 2021 21:17)  HR: 79 (12 Nov 2021 10:10) (79 - 87)  BP: 160/73 (12 Nov 2021 10:10) (137/74 - 160/73)  BP(mean): --  RR: 18 (12 Nov 2021 10:10) (18 - 19)  SpO2: 99% (12 Nov 2021 10:10) (95% - 100%)    GEN: Awake, LETHARGIC, NAD  HEAD: Normocephalic and atraumatic,   EYES: Anicteric sclerae, EOM's grossly intact  NECK: No JVD, no thyromegaly  PULM: Comfortable work of breathing, clear BS  CV: Pulses 2+ symmetric bilaterally, regular rate and rhythm  ABD: Not distended, soft, non-tender,   EXT: No edema, No deformities  PSYCH: Appropriate mood and affect, no suicidal ideations elicited  NEURO: No facial asymmetry, no tremors, no observed movement disorders  SKIN: No rashes or lesions on exposed skin, No jaundice    =======================================================  ----- LABS:

## 2021-11-12 NOTE — DISCHARGE NOTE PROVIDER - HOSPITAL COURSE
81F Hx hypothyroidism, GERD, HTN,  asthma, afib w/ Eliquis &  metoprolol + Cardizem, lung cancer s/p chemo and radiation & immunotherapy 02/2019 but currently getting Venofer/ Gemzar last dose 10/6 p/w worsening difficulty with eating x weeks admitted for Afib w/ rvr and FTT now DNR/DNI.      Problem/Plan - 1:  ·  Problem: Adult failure to thrive.   ·  Plan: - NSCLC on 4th line chemotherapy   - unable to really take much PO, soft foods and soup with abd pain and pressure   - first blm mouthwash and hicodine for cough  - NS @ 100 ml/h  - family and pt wishing to avoid PEG  - On Minced and moist diet   - case d/w daughter pleasure feeds as tolerated  - appreciate palliative recs reglan standing with PRN zofran no ativan for nausea  - case d/w palliative increase Reglan suspect nausea due to fentanyl would hold off on steroid now   - home hospice unable to accept case as family doesn't want anybody in the house   - case d/w Dr. Olivares daughter family will care for patient will alternate care between siblings along with father.   - d/w Dr. Jose Liang pts PCP.     Problem/Plan - 2:  ·  Problem: Constipation.   ·  Plan: - no documented BM   - will give lactulose x 1 and dulcolax supp  - check AXR assess stool burden  - check bladder scan if retaining will straight cath family considering loza cath for comfort.     Problem/Plan - 3:  ·  Problem: Afib.   ·  Plan: - Known Afib; had afib w/ rvr   - s/p 120mg diltiazem and metoprolol succinate 100mg-- with bradycardia cards rec avoid ca channel blocker  - EKG showed Afib QTc: 464  - CT A/P on admission showing pericardial effusion   - digoxin load as BP borderline with midodrine and digoxin 125 mcg qd   - family wishing to avoid invasive procedures will cancel TTE   - BP stable discontinued midodrine   - off digoxin after discussion with EP.     Problem/Plan - 4:  ·  Problem: Hyperkalemia.   ·  Plan: - K 5.7 hemolyzed   - lokalema x 1  - no further labs as comfort.     Problem/Plan - 5:  ·  Problem: Leukocytosis.   ·  Plan: -  poss due to volume contraction and underlying ca   - afebrile   - CXR LT sided white no new infiltrate  - UA- positive with epithelial cells without symptoms and afebrile WBC decreasing off antibiotics  - no further labs family opting for comfort.     Problem/Plan - 6:  ·  Problem: Elevated serum creatinine.   ·  Plan: - JOSE G due to poor PO tintake  - Fe urea<35% consistent with pre renal state  - bladder scan with 79 ml  - cont IVF  - renal US neg hydro  - Case d/w Daughter over telephone since goal is comfort will avoid labs.     Problem/Plan - 7:  ·  Problem: Lung cancer.   ·  Plan: - NSCLC on 4th line chemotherapy   - unable to really take much PO, soft foods and soup with abd pain and pressure   - Case d./w Dr. Olivares at bedside given failure of multiple lines of chemo currently given functional status not a candidate for chemo. outlook does not look good understands and her and family considering DNR/DNI and hospice after discussion with palliative.  - Now DNR/DNI no pressors, comfort care  - pain control with oxycodone PO.     Problem/Plan - 8:  ·  Problem: Hypothyroidism.   ·  Plan: - known hypothyroidism,  - c/w synthroid 75mcg.     Problem/Plan - 9:  ·  Problem: Asthma.   ·  Plan: Hx asthma  - c/w budesonide and albuterol prn.     Problem/Plan - 10:  ·  Problem: GERD (gastroesophageal reflux disease).   ·  Plan; - pantoprozole 40mg qd.     Problem/Plan - 11:  ·  Problem: Prophylactic measure.   ·  Plan: DVT PPx: On eliquis  Diet: Regular  Code: DNR/DNI  Dispo: Home w/ hospice.   81F Hx hypothyroidism, GERD, HTN,  asthma, afib w/ Eliquis &  metoprolol + Cardizem, lung cancer s/p chemo and radiation & immunotherapy 02/2019 but currently getting Venofer/ Gemzar last dose 10/6 p/w worsening difficulty with eating x weeks admitted for Afib w/ rvr and FTT now DNR/DNI.     Adult failure to thrive.   - NSCLC on 4th line chemotherapy   - unable to really take much PO, soft foods and soup with abd pain and pressure   - first blm mouthwash and hicodine for cough  - family and pt wishing to avoid PEG  - On Minced and moist diet   - case d/w daughter pleasure feeds as tolerated  - appreciate palliative recs reglan standing with PRN zofran no ativan for nausea  - case d/w palliative increase Reglan suspect nausea due to fentanyl would hold off on steroid now   - home hospice unable to accept case as family doesn't want anybody in the house   - case d/w Dr. Olivares daughter family will care for patient will alternate care between siblings along with father.   - d/w Dr. Jose Liang pts PCP.    Constipation.   - no documented BM   - will give lactulose x 1 and dulcolax supp  - retaining on bladder scan, now with loza     Afib.   - Known Afib; had afib w/ rvr   - s/p 120mg diltiazem and metoprolol succinate 100mg-- with bradycardia cards rec avoid ca channel blocker  - EKG showed Afib QTc: 464  - CT A/P on admission showing pericardial effusion   - digoxin load as BP borderline with midodrine and digoxin 125 mcg qd   - family wishing to avoid invasive procedures will cancel TTE   - BP stable discontinued midodrine   - off digoxin after discussion with EP.    Hyperkalemia.   ·  Plan: - K 5.7 hemolyzed   - lokalema x 1  - no further labs as comfort.    Leukocytosis.   -  poss due to volume contraction and underlying ca   - afebrile   - CXR LT sided white no new infiltrate  - UA- positive with epithelial cells without symptoms and afebrile WBC decreasing off antibiotics  - no further labs family opting for comfort.    Elevated serum creatinine.   - JOSE G due to poor PO tintake  - Fe urea<35% consistent with pre renal state  - loza placed 11/12  - Case d/w Daughter over telephone since goal is comfort will avoid labs.    Lung cancer.   - NSCLC on 4th line chemotherapy   - unable to really take much PO, soft foods and soup with abd pain and pressure   - Case d./w Dr. Olivares at bedside given failure of multiple lines of chemo currently given functional status not a candidate for chemo. outlook does not look good understands and her and family considering DNR/DNI and hospice after discussion with palliative.  - Now DNR/DNI no pressors, comfort care  - pain control with oxycodone PO.    Hypothyroidism.   - c/w synthroid 75mcg.    Asthma.   - Hx asthma  - c/w budesonide and albuterol prn.    GERD  - pantoprozole 40mg qd      Discussed case with Dr. Boswell on 11/12/2021, patient medically stable for discharge to home today.

## 2021-11-12 NOTE — PROGRESS NOTE ADULT - PROBLEM SELECTOR PLAN 5
-  poss due to volume contraction and underlying ca   - afebrile   - CXR LT sided white no new infiltrate  - UA- positive with epithelial cells without symptoms and afebrile WBC decreasing off antibiotics  - no further labs family opting for comfort

## 2021-11-12 NOTE — DISCHARGE NOTE NURSING/CASE MANAGEMENT/SOCIAL WORK - NSDCDMETYPESERV_GEN_ALL_CORE_FT
oxygen ([employer]), RW and commode will be delivered at bedside  concentrator will be delivered to home a day after discharge

## 2021-11-12 NOTE — PROGRESS NOTE ADULT - PROBLEM SELECTOR PLAN 7
- NSCLC on 4th line chemotherapy   - unable to really take much PO, soft foods and soup with abd pain and pressure   - Case d./w Dr. Olivares at bedside given failure of multiple lines of chemo currently given functional status not a candidate for chemo. outlook does not look good understands and her and family considering DNR/DNI and hospice after discussion with palliative.  - Now DNR/DNI no pressors, comfort care  - pain control with oxycodone PO

## 2021-11-12 NOTE — PROGRESS NOTE ADULT - PROBLEM SELECTOR PLAN 1
- NSCLC on 4th line chemotherapy   - unable to really take much PO, soft foods and soup with abd pain and pressure   - first blm mouthwash and hicodine for cough  - NS @ 100 ml/h  - family and pt wishing to avoid PEG  - On Minced and moist diet   - case d/w daughter pleasure feeds as tolerated  - appreciate palliative recs reglan standing with PRN zofran no ativan for nausea  - case d/w palliative increase Reglan suspect nausea due to fentanyl would hold off on steroid now   - home hospice unable to accept case as family doesn't want anybody in the house   - case d/w Dr. Olivares daughter family will care for patient will alternate care between siblings along with father.   - d/w Dr. Jose Liang pts PCP

## 2021-11-12 NOTE — CHART NOTE - NSCHARTNOTEFT_GEN_A_CORE
Patient's oxygen saturation on room air on ambulation is 87%.  Patient's oxygen saturation on ambulation with 2L nasal cannula improved to 96%. Patient qualifies for home oxygen. Patient's oxygen saturation on room air at rest is 95%. Patient's oxygen saturation on room air on ambulation is 87%.  Patient's oxygen saturation on ambulation with 2L nasal cannula improved to 96%. Patient qualifies for home oxygen.

## 2021-11-12 NOTE — PROGRESS NOTE ADULT - SUBJECTIVE AND OBJECTIVE BOX
Patient is a 81y old  Female who presents with a chief complaint of Afib with RVR (11 Nov 2021 13:10)      SUBJECTIVE / OVERNIGHT EVENTS: pt in NAD. no BM since 12/8  ADDITIONAL REVIEW OF SYSTEMS:    MEDICATIONS  (STANDING):  apixaban 2.5 milliGRAM(s) Oral every 12 hours  artificial  tears Solution 1 Drop(s) Both EYES four times a day  buDESOnide    Inhalation Suspension 0.5 milliGRAM(s) Inhalation daily  dextrose 5% + sodium chloride 0.9%. 1000 milliLiter(s) (60 mL/Hr) IV Continuous <Continuous>  influenza  Vaccine (HIGH DOSE) 0.7 milliLiter(s) IntraMuscular once  levothyroxine 75 MICROGram(s) Oral daily  metoclopramide 10 milliGRAM(s) Oral every 8 hours  midodrine. 5 milliGRAM(s) Oral every 8 hours  ondansetron    Tablet 4 milliGRAM(s) Oral every 8 hours  pantoprazole  Injectable 40 milliGRAM(s) IV Push daily    MEDICATIONS  (PRN):  acetaminophen     Tablet .. 650 milliGRAM(s) Oral every 6 hours PRN Mild Pain (1 - 3)  FIRST- Mouthwash  BLM 5 milliLiter(s) Swish and Spit every 6 hours PRN Mouth Care  hydrocodone/homatropine Syrup 5 milliLiter(s) Oral every 6 hours PRN Cough  oxyCODONE    Solution 5 milliGRAM(s) Oral every 3 hours PRN pain      CAPILLARY BLOOD GLUCOSE        I&O's Summary      PHYSICAL EXAM:  Vital Signs Last 24 Hrs  T(C): 36.8 (12 Nov 2021 05:23), Max: 36.8 (11 Nov 2021 21:17)  T(F): 98.2 (12 Nov 2021 05:23), Max: 98.2 (11 Nov 2021 21:17)  HR: 85 (12 Nov 2021 08:59) (84 - 88)  BP: 137/74 (12 Nov 2021 05:23) (136/72 - 141/86)  BP(mean): --  RR: 18 (12 Nov 2021 05:23) (18 - 19)  SpO2: 99% (12 Nov 2021 08:59) (99% - 100%)    CONSTITUTIONAL: NAD  EYES: conjunctiva and sclera clear  ENMT: Moist oral mucosa,  NECK: Supple  RESPIRATORY: Normal respiratory effort; lungs are clear to auscultation bilaterally  CARDIOVASCULAR: s1/s2 irregular irregular   ABDOMEN:+ BS; LT LLQ mild tender  MUSCULOSKELETAL:  mild pedal edema   PSYCH: A+O self and hospital   NEUROLOGY: moving all ext       LABS:                      RADIOLOGY & ADDITIONAL TESTS:  Results Reviewed:   Imaging Personally Reviewed:  Electrocardiogram Personally Reviewed:    COORDINATION OF CARE:  Care Discussed with Consultants/Other Providers [Y/N]:  Prior or Outpatient Records Reviewed [Y/N]:   Patient is a 81y old  Female who presents with a chief complaint of Afib with RVR (11 Nov 2021 13:10)      SUBJECTIVE / OVERNIGHT EVENTS: pt in NAD. no BM since 12/8. as per staff difficulty urinating   ADDITIONAL REVIEW OF SYSTEMS: denies Nausea/vomiting     MEDICATIONS  (STANDING):  apixaban 2.5 milliGRAM(s) Oral every 12 hours  artificial  tears Solution 1 Drop(s) Both EYES four times a day  buDESOnide    Inhalation Suspension 0.5 milliGRAM(s) Inhalation daily  dextrose 5% + sodium chloride 0.9%. 1000 milliLiter(s) (60 mL/Hr) IV Continuous <Continuous>  influenza  Vaccine (HIGH DOSE) 0.7 milliLiter(s) IntraMuscular once  levothyroxine 75 MICROGram(s) Oral daily  metoclopramide 10 milliGRAM(s) Oral every 8 hours  midodrine. 5 milliGRAM(s) Oral every 8 hours  ondansetron    Tablet 4 milliGRAM(s) Oral every 8 hours  pantoprazole  Injectable 40 milliGRAM(s) IV Push daily    MEDICATIONS  (PRN):  acetaminophen     Tablet .. 650 milliGRAM(s) Oral every 6 hours PRN Mild Pain (1 - 3)  FIRST- Mouthwash  BLM 5 milliLiter(s) Swish and Spit every 6 hours PRN Mouth Care  hydrocodone/homatropine Syrup 5 milliLiter(s) Oral every 6 hours PRN Cough  oxyCODONE    Solution 5 milliGRAM(s) Oral every 3 hours PRN pain      CAPILLARY BLOOD GLUCOSE        I&O's Summary      PHYSICAL EXAM:  Vital Signs Last 24 Hrs  T(C): 36.8 (12 Nov 2021 05:23), Max: 36.8 (11 Nov 2021 21:17)  T(F): 98.2 (12 Nov 2021 05:23), Max: 98.2 (11 Nov 2021 21:17)  HR: 85 (12 Nov 2021 08:59) (84 - 88)  BP: 137/74 (12 Nov 2021 05:23) (136/72 - 141/86)  BP(mean): --  RR: 18 (12 Nov 2021 05:23) (18 - 19)  SpO2: 99% (12 Nov 2021 08:59) (99% - 100%)    CONSTITUTIONAL: NAD  EYES: conjunctiva and sclera clear  ENMT: Moist oral mucosa,  NECK: Supple  RESPIRATORY: Normal respiratory effort; lungs are clear to auscultation bilaterally  CARDIOVASCULAR: s1/s2 irregular irregular   ABDOMEN:+ BS; suprapubic and LT LLQ mild tenderness without rebound or guarding   MUSCULOSKELETAL:  mild pedal edema   PSYCH: A+O self and hospital   NEUROLOGY: moving all ext       LABS:                      RADIOLOGY & ADDITIONAL TESTS:  Results Reviewed:   Imaging Personally Reviewed:  Electrocardiogram Personally Reviewed:    COORDINATION OF CARE:  Care Discussed with Consultants/Other Providers [Y/N]:  Prior or Outpatient Records Reviewed [Y/N]:

## 2021-11-12 NOTE — DISCHARGE NOTE NURSING/CASE MANAGEMENT/SOCIAL WORK - PATIENT PORTAL LINK FT
You can access the FollowMyHealth Patient Portal offered by [facility] by registering at the following website: [email or web address]. By joining [facility]’s FollowMyHealth portal, you will also be able to view your health information using other applications (apps) compatible with our system.

## 2021-11-12 NOTE — DISCHARGE NOTE PROVIDER - NSDCMRMEDTOKEN_GEN_ALL_CORE_FT
amLODIPine 10 mg oral tablet: 1 tab(s) orally once a day  Artificial Tears ophthalmic solution: 1 drop(s) to each affected eye 4 times a day, As Needed  budesonide 0.5 mg/2 mL inhalation suspension: 2 milliliter(s) inhaled once a day  dilTIAZem 120 mg oral tablet: 1 tab(s) orally once a day  Eliquis 2.5 mg oral tablet: 1 tab(s) orally 2 times a day  famotidine 40 mg oral tablet: 1 tab(s) orally once a day  Flovent 110 mcg/inh inhalation aerosol with adapter: 1 dose(s) inhaled once a day  Metoprolol Succinate  mg oral tablet, extended release: 1 tab(s) orally once a day  omeprazole 40 mg oral delayed release capsule: 1 cap(s) orally once a day  Synthroid 75 mcg (0.075 mg) oral tablet: 1 tab(s) orally once a day   acetaminophen 325 mg oral tablet: 2 tab(s) orally every 6 hours, As needed, Mild Pain (1 - 3)  Artificial Tears ophthalmic solution: 1 drop(s) to each affected eye 4 times a day, As Needed  budesonide 0.5 mg/2 mL inhalation suspension: 2 milliliter(s) inhaled once a day  Flovent 110 mcg/inh inhalation aerosol with adapter: 1 dose(s) inhaled once a day  omeprazole 40 mg oral delayed release capsule: 1 cap(s) orally once a day  oxyCODONE 5 mg/5 mL oral solution: 5 milliliter(s) orally every 3 hours, As needed, pain  Synthroid 75 mcg (0.075 mg) oral tablet: 1 tab(s) orally once a day   acetaminophen 325 mg oral tablet: 2 tab(s) orally every 6 hours, As needed, Mild Pain (1 - 3)  Artificial Tears ophthalmic solution: 1 drop(s) to each affected eye 4 times a day, As Needed  budesonide 0.5 mg/2 mL inhalation suspension: 2 milliliter(s) inhaled once a day  Flovent 110 mcg/inh inhalation aerosol with adapter: 1 dose(s) inhaled once a day  metoclopramide 10 mg oral tablet: 1 tab(s) orally every 8 hours  MiraLax oral powder for reconstitution: 17 gram(s) orally once a day  omeprazole 40 mg oral delayed release capsule: 1 cap(s) orally once a day  ondansetron 4 mg oral tablet: 1 tab(s) orally every 8 hours  Senna 8.6 mg oral tablet: 1 tab(s) orally once a day (at bedtime)  Synthroid 75 mcg (0.075 mg) oral tablet: 1 tab(s) orally once a day

## 2022-04-14 NOTE — GOALS OF CARE CONVERSATION - ADVANCED CARE PLANNING - NSGCTIMESPENT_GEN_ALL_CORE
30 Problem: Pain:  Goal: Pain level will decrease  Description: Pain level will decrease  Outcome: Ongoing  Goal: Control of acute pain  Description: Control of acute pain  Outcome: Ongoing  Goal: Control of chronic pain  Description: Control of chronic pain  Outcome: Ongoing     Problem: Falls - Risk of:  Goal: Will remain free from falls  Description: Will remain free from falls  Outcome: Ongoing  Goal: Absence of physical injury  Description: Absence of physical injury  Outcome: Ongoing

## 2022-05-09 NOTE — ASU PREOP CHECKLIST - HEIGHT IN INCHES
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

## 2022-05-31 NOTE — ASU PREOP CHECKLIST - NOTHING BY MOUTH SINCE
PAST SURGICAL HISTORY:  No significant past surgical history     No significant past surgical history      06-Sep-2017 22:30

## 2022-08-08 NOTE — PATIENT PROFILE ADULT - OVER THE PAST TWO WEEKS HAVE YOU FELT DOWN, DEPRESSED OR HOPELESS?
Chief Complaint:   Chief Complaint   Patient presents with   • Pacemaker Check     Medtronic Dual Chamber         Vitals:  Visit Vitals  BP (!) 154/94   Pulse 76   Ht 5' 10\" (1.778 m)   Wt 89.8 kg (198 lb)   BMI 28.41 kg/m²       HISTORY OF PRESENT ILLNESS     Pt is here for follow up for his PPM.  He notes that he is doing fine and has no issues with his PPM.  Pt notes some chest discomfort while vacationing in [place].  He also notes some shortness of breat with exertion.  He is on Carvedilol and Warfarin anticoagulation and tolerating them well without any issues or bleeding issues.    REVIEW OF SYSTEMS   ROS  A problem-pertinent review of systems performed and, aside from what is mentioned in the HPI, was negative.      PHYSICAL EXAM   Physical Exam  Vitals reviewed.   Constitutional:       General: He is not in acute distress.     Appearance: He is not diaphoretic.   HENT:      Head:      Comments: Wears mask  Musculoskeletal:         General: Normal range of motion.      Comments: Ambulate without assistance   Skin:     Findings: No erythema or rash.      Comments: Device incision healed well   Neurological:      Mental Status: He is alert.         Sodium (mmol/L)   Date Value   05/30/2016 133 (L)     Potassium (mmol/L)   Date Value   05/30/2016 4.7     Chloride (mmol/L)   Date Value   05/30/2016 98     Glucose (mg/dL)   Date Value   05/30/2016 106 (H)     CALCIUM (mg/dL)   Date Value   05/30/2016 8.3 (L)     Carbon Dioxide (mmol/L)   Date Value   05/30/2016 25     BUN (mg/dL)   Date Value   05/30/2016 23 (H)     Creatinine (mg/dL)   Date Value   11/22/2021 0.90     GFR Estimate, Non  (no units)   Date Value   05/30/2016 >90     WBC (K/mcL)   Date Value   05/29/2016 7.4     RBC (mil/mcL)   Date Value   05/29/2016 3.90 (L)     HCT (%)   Date Value   05/29/2016 34.0 (L)     HGB (g/dL)   Date Value   05/29/2016 11.9 (L)     PLT (K/mcL)   Date Value   05/29/2016 176     TSH (mcUnits/mL)   Date Value    05/19/2016 1.983     INR (no units)   Date Value   08/02/2022 2.2         ASSESSMENT/PLAN     1. Appropriately functioning Medtronic dual-chamber pacemaker generator and lead system.  3.5 year battery longevity.  Right pectoral implant.  2. Paroxysmal atrial fibrillation/atrial flutter:  Asymptomatic  3. Chads Vasc score of 2. On warfarin.  No major bleeding issues reported.  4. Status post bioprosthetic aortic valve replacement  5. Complete heart block:  Sudden change with 100% RV pacing since August 2020.  Patient asymptomatic.  Echocardiogram in July 2021 at [facility] revealed an EF of 46%-stable. NYHA class 1-2 chronic systolic congestive Heart failure.  Tolerating low-dose carvedilol well so far-increased to 6.25 b.i.d..  6. Chest discomfort upon climbing multiple flights of stairs when visiting capsules in your up over the last 2 weeks.  No stress test in over 20 years.  He does have a history of severe peripheral vascular disease, therefore at increased risk of developing coronary artery disease.  Recommend pharmacologic stress test.  Patient agreeable.  His cardiologist Dr. Ralph was informed who will arrange the stress test.     Follow-up in 2-3 weeks for up titration of carvedilol      On 8/8/2022, Diandra CHAU SCRIBE scribed the services personally performed by Dr. Murdock.  The documentation recorded by the scribe accurately and completely reflects the service(s) I personally performed and the decisions made by me.        no

## 2022-09-01 NOTE — H&P PST ADULT - NS PRO FEM REPRO HEALTH SCREEN
Marianna Bunn was seen and treated in our emergency department on 9/1/2022. He may return to work on 09/02/2022. Limit use left hand / wrist.      If you have any questions or concerns, please don't hesitate to call.       Kate Ames MD mammogram

## 2022-09-30 NOTE — PHYSICAL EXAM
no chest pain and no edema.
[Normal] : oriented to person, place and time, the affect was normal, the mood was normal and not anxious

## 2022-10-03 NOTE — H&P PST ADULT - NSANTHOSAYNRD_GEN_A_CORE
This patient has a history of having labs done and then refusing to have a follow up appointment to discuss results.  Therefore, she cannot have labs done prior to her appointments (she has been advised of this in the past).  I will review her chart at the time of her appointment and order appropriate labs at that time.  If she wants to fast for her appointment, she can do them right after her appointment.  Otherwise, she will need to return to have the labs on another day.   No. DEION screening performed.  STOP BANG Legend: 0-2 = LOW Risk; 3-4 = INTERMEDIATE Risk; 5-8 = HIGH Risk

## 2022-10-31 NOTE — PATIENT PROFILE ADULT - NSTRANSFERBELONGINGSDISPO_GEN_A_NUR
Patient would like this removed, it is located to the center of her back, referral placed to surgery.   with patient
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
## 2023-05-19 NOTE — H&P PST ADULT - NEGATIVE GENERAL GENITOURINARY SYMPTOMS
Caller: Palma Roblero    Relationship: Self    Best call back number: [phone] (CELL)    What is the best time to reach you: ANYTIME    Who are you requesting to speak with (clinical staff, provider,  specific staff member): MISSED CALL    What was the call regarding: PT RETURNING MISSED CALL    Do you require a callback: YES PLEASE          
no incontinence/no flank pain L/no bladder infections/no dysuria/no hematuria/no flank pain R

## 2023-07-10 NOTE — DATA REVIEWED
[FreeTextEntry1] : PET/CT on 9/13/19:\par - diffusely increased FDG avidity within the enlarged thyroid gland, SUV=7.8\par - FDG avid focus right maxillary incisor, probably periodontal disease, SUV= 4.4\par - FDG avid soft tissue left paramediastinal region, measuring 3.0 cm, SUV=18.2, suspicious for recurrent disease\par - small left hilar focus, just posterior to the left mainstem bronchus, SUV=13.2 \par - post-radiation changes\par - stable small left pleural effusion
Fall with Harm Risk

## 2023-07-14 NOTE — H&P PST ADULT - PRO INTERPRETER NEED 2
Mal[place] Enbrel Counseling:  I discussed with the patient the risks of etanercept including but not limited to myelosuppression, immunosuppression, autoimmune hepatitis, demyelinating diseases, lymphoma, and infections.  The patient understands that monitoring is required including a PPD at baseline and must alert us or the primary physician if symptoms of infection or other concerning signs are noted.

## 2023-11-15 NOTE — ASU PATIENT PROFILE, ADULT - IS PATIENT PREGNANT?
Continue Regimen: Valtrex 2 gram BID for 1 day at first sign of symptoms, then 1 gram BID for 5 days if needed Render In Strict Bullet Format?: No Detail Level: Zone no

## 2023-12-15 NOTE — PROGRESS NOTE ADULT - SUBJECTIVE AND OBJECTIVE BOX
Patient is a 81y old  Female who presents with a chief complaint of Afib with RVR (10 Nov 2021 12:48)      SUBJECTIVE / OVERNIGHT EVENTS:  ADDITIONAL REVIEW OF SYSTEMS:    MEDICATIONS  (STANDING):  apixaban 2.5 milliGRAM(s) Oral every 12 hours  artificial  tears Solution 1 Drop(s) Both EYES four times a day  buDESOnide    Inhalation Suspension 0.5 milliGRAM(s) Inhalation daily  dextrose 5% + sodium chloride 0.9%. 1000 milliLiter(s) (100 mL/Hr) IV Continuous <Continuous>  influenza  Vaccine (HIGH DOSE) 0.7 milliLiter(s) IntraMuscular once  levothyroxine 75 MICROGram(s) Oral daily  metoclopramide 10 milliGRAM(s) Oral every 8 hours  midodrine. 5 milliGRAM(s) Oral every 8 hours  pantoprazole  Injectable 40 milliGRAM(s) IV Push daily    MEDICATIONS  (PRN):  acetaminophen     Tablet .. 650 milliGRAM(s) Oral every 6 hours PRN Mild Pain (1 - 3)  FIRST- Mouthwash  BLM 5 milliLiter(s) Swish and Spit every 6 hours PRN Mouth Care  hydrocodone/homatropine Syrup 5 milliLiter(s) Oral every 6 hours PRN Cough  ondansetron   Disintegrating Tablet 8 milliGRAM(s) Oral every 6 hours PRN Nausea and/or Vomiting  oxyCODONE    Solution 5 milliGRAM(s) Oral every 3 hours PRN pain      CAPILLARY BLOOD GLUCOSE        I&O's Summary      PHYSICAL EXAM:  Vital Signs Last 24 Hrs  T(C): 36.3 (2021 05:46), Max: 36.3 (10 Nov 2021 22:25)  T(F): 97.4 (2021 05:46), Max: 97.4 (10 Nov 2021 22:25)  HR: 80 (2021 09:16) (77 - 102)  BP: 134/70 (2021 05:46) (103/58 - 139/69)  BP(mean): --  RR: 18 (2021 05:46) (18 - 18)  SpO2: 99% (2021 09:16) (98% - 99%)    CONSTITUTIONAL: NAD  EYES: PERRLA; conjunctiva and sclera clear  ENMT: Moist oral mucosa,   NECK: Supple, no palpable masses;   RESPIRATORY: Normal respiratory effort; lungs are clear to auscultation bilaterally  CARDIOVASCULAR: iregularly irregular s1/s2  ABDOMEN: Nontender to palpation, normoactive bowel sounds, no rebound/guarding;  MUSCULOSKELETAL:  no edema  PSYCH: A+O to person, place, and time; affect appropriat      LABS:                        12.8   15.07 )-----------( 267      ( 10 Nov 2021 05:52 )             41.8     11-10    144  |  117<H>  |  28<H>  ----------------------------<  82  5.7<H>   |  12<L>  |  1.36<H>    Ca    8.2<L>      10 Nov 2021 05:52  Phos  3.9     11-10  Mg     2.20     11-10    TPro  5.4<L>  /  Alb  2.5<L>  /  TBili  0.4  /  DBili  x   /  AST  23  /  ALT  18  /  AlkPhos  106  11-10      CARDIAC MARKERS ( 10 Nov 2021 05:52 )  x     / x     / 94 U/L / x     / 3.8 ng/mL      Urinalysis Basic - ( 2021 16:43 )    Color: Yellow / Appearance: Clear / S.028 / pH: x  Gluc: x / Ketone: Small  / Bili: Small / Urobili: 3 mg/dL   Blood: x / Protein: 100 mg/dL / Nitrite: Negative   Leuk Esterase: Small / RBC: 7 /HPF / WBC 25 /HPF   Sq Epi: x / Non Sq Epi: 6 /HPF / Bacteria: Negative          RADIOLOGY & ADDITIONAL TESTS:  Results Reviewed:   Imaging Personally Reviewed:  Electrocardiogram Personally Reviewed:    COORDINATION OF CARE:  Care Discussed with Consultants/Other Providers [Y/N]:  Prior or Outpatient Records Reviewed [Y/N]:   Refill sent to pharmacy    Patient is a 81y old  Female who presents with a chief complaint of Afib with RVR (10 Nov 2021 12:48)      SUBJECTIVE / OVERNIGHT EVENTS: pain Ok still has nausea   ADDITIONAL REVIEW OF SYSTEMS: denies CP/SOb/palpatation    MEDICATIONS  (STANDING):  apixaban 2.5 milliGRAM(s) Oral every 12 hours  artificial  tears Solution 1 Drop(s) Both EYES four times a day  buDESOnide    Inhalation Suspension 0.5 milliGRAM(s) Inhalation daily  dextrose 5% + sodium chloride 0.9%. 1000 milliLiter(s) (100 mL/Hr) IV Continuous <Continuous>  influenza  Vaccine (HIGH DOSE) 0.7 milliLiter(s) IntraMuscular once  levothyroxine 75 MICROGram(s) Oral daily  metoclopramide 10 milliGRAM(s) Oral every 8 hours  midodrine. 5 milliGRAM(s) Oral every 8 hours  pantoprazole  Injectable 40 milliGRAM(s) IV Push daily    MEDICATIONS  (PRN):  acetaminophen     Tablet .. 650 milliGRAM(s) Oral every 6 hours PRN Mild Pain (1 - 3)  FIRST- Mouthwash  BLM 5 milliLiter(s) Swish and Spit every 6 hours PRN Mouth Care  hydrocodone/homatropine Syrup 5 milliLiter(s) Oral every 6 hours PRN Cough  ondansetron   Disintegrating Tablet 8 milliGRAM(s) Oral every 6 hours PRN Nausea and/or Vomiting  oxyCODONE    Solution 5 milliGRAM(s) Oral every 3 hours PRN pain      CAPILLARY BLOOD GLUCOSE        I&O's Summary      PHYSICAL EXAM:  Vital Signs Last 24 Hrs  T(C): 36.3 (2021 05:46), Max: 36.3 (10 Nov 2021 22:25)  T(F): 97.4 (2021 05:46), Max: 97.4 (10 Nov 2021 22:25)  HR: 80 (2021 09:16) (77 - 102)  BP: 134/70 (2021 05:46) (103/58 - 139/69)  BP(mean): --  RR: 18 (2021 05:46) (18 - 18)  SpO2: 99% (2021 09:16) (98% - 99%)    CONSTITUTIONAL: NAD  EYES: PERRLA; conjunctiva and sclera clear  ENMT: Moist oral mucosa,   NECK: Supple, no palpable masses;   RESPIRATORY: Normal respiratory effort; lungs are clear to auscultation bilaterally  CARDIOVASCULAR: iregularly irregular s1/s2  ABDOMEN: Nontender to palpation, normoactive bowel sounds, no rebound/guarding;  MUSCULOSKELETAL:  no edema  PSYCH: A+O to person, place, and time; affect appropriat      LABS:                        12.8   15.07 )-----------( 267      ( 10 Nov 2021 05:52 )             41.8     11-10    144  |  117<H>  |  28<H>  ----------------------------<  82  5.7<H>   |  12<L>  |  1.36<H>    Ca    8.2<L>      10 Nov 2021 05:52  Phos  3.9     11-10  Mg     2.20     11-10    TPro  5.4<L>  /  Alb  2.5<L>  /  TBili  0.4  /  DBili  x   /  AST  23  /  ALT  18  /  AlkPhos  106  11-10      CARDIAC MARKERS ( 10 Nov 2021 05:52 )  x     / x     / 94 U/L / x     / 3.8 ng/mL      Urinalysis Basic - ( 2021 16:43 )    Color: Yellow / Appearance: Clear / S.028 / pH: x  Gluc: x / Ketone: Small  / Bili: Small / Urobili: 3 mg/dL   Blood: x / Protein: 100 mg/dL / Nitrite: Negative   Leuk Esterase: Small / RBC: 7 /HPF / WBC 25 /HPF   Sq Epi: x / Non Sq Epi: 6 /HPF / Bacteria: Negative          RADIOLOGY & ADDITIONAL TESTS:  Results Reviewed:   Imaging Personally Reviewed:  Electrocardiogram Personally Reviewed:    COORDINATION OF CARE:  Care Discussed with Consultants/Other Providers [Y/N]:  Prior or Outpatient Records Reviewed [Y/N]:

## 2024-04-01 NOTE — ASU DISCHARGE PLAN (ADULT/PEDIATRIC) - PLEASE INDICATE TEMPERATURE IN FAHRENHEIT OR CELSIUS
Next appt: 7/2/24  Last appt: 1/2/24    Refill Request for: Adderall     Last dispensed: 2/21/24 30 day supply  PDMP reviewed; no aberrant behavior identified, prescription authorized.    Controlled medication- Routing to Provider. OK to approve.    101.3

## 2024-10-01 NOTE — H&P PST ADULT - NS MD HP PULSE DORSALIS
PHYSICAL THERAPY EVALUATION  Type of Visit: Evaluation       Fall Risk Screen:  Fall screen completed by: PT  Have you fallen 2 or more times in the past year?: No  Have you fallen and had an injury in the past year?: No  Is patient a fall risk?: No    Subjective         Pt presents to PT for eval and treat for Venous stasis dermatitis of both lower extremities. She reports that she is here because she would like to obtain velcro compression garments to use daily. She mostly has questions about her veins as she feels like she can see them more than she used to. She denies heaviness or fullness in her legs. She does note slight pain and tenderness in her legs.       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, 2016 kidney transplant    Prior diagnostic imaging/testing results:       Prior therapy history for the same diagnosis, illness or injury: No      Prior Level of Function  Transfers: Independent  Ambulation: Independent  ADL: Independent  IADL:     Living Environment  Social support: With family members   Type of home: House; 1 level; Basement   Stairs to enter the home: Yes 2 Is there a railing: No     Ramp: No   Stairs inside the home: Yes 1 Is there a railing: Yes     Help at home: None  Equipment owned: Four-point cane; Walker; Standard wheelchair; Bath bench     Employment: No    Hobbies/Interests: cooking, reading, taking walks, Buddhism    Patient goals for therapy: walk more and further distances    Pain assessment:      Objective            Cognitive Status Examination  Orientation: Oriented to person, place and time   Level of Consciousness: Alert  Follows Commands and  Answers Questions: 100% of the time  Personal Safety and Judgement: Intact  Memory: Intact    OBSERVATION: Pt presents with hemosiderin staining in B LE, slight tenderness to palpation throughout B LE  INTEGUMENTARY:  hemosiderin staining around B ankles  POSTURE: Sitting Posture: Rounded shoulders, Forward head, Thoracic kyphosis increased  PALPATION: tenderness throughout B LE, sensitivity around B ankles  RANGE OF MOTION: LE ROM WFL  STRENGTH: LE Strength WFL  Grossly 3+/5 throughout     BED MOBILITY: WNL    TRANSFERS: WNL      GAIT:   Level of [place]: WNL  Assistive Device(s): None  Gait Deviations: Erica decreased        SENSATION:  pt notes numbness and decreased impaired light touch sensation      Assessment & Plan   CLINICAL IMPRESSIONS  Medical Diagnosis: Venous stasis dermatitis of both lower extremities    Treatment Diagnosis: lower extremity edema, weakness, pain   Impression/Assessment: Patient is a 65 year old female with pain and bilateral lower extremity edema complaints.  The following significant findings have been identified: Pain, Decreased ROM/flexibility, Decreased strength, Impaired sensation, Edema, Impaired muscle performance, Decreased activity tolerance, and Impaired posture. These impairments interfere with their ability to perform self care tasks, work tasks, recreational activities, household chores, and community mobility as compared to previous level of function.     Clinical Decision Making (Complexity):  Clinical Presentation: Stable/Uncomplicated  Clinical Presentation Rationale: based on medical and personal factors listed in PT evaluation  Clinical Decision Making (Complexity): Low complexity    PLAN OF CARE  Treatment Interventions:  Interventions: Gait Training, Manual Therapy, Neuromuscular Re-education, Therapeutic Activity, Therapeutic Exercise, Gradient Compression Bandaging    Long Term Goals     PT Goal 1  Goal Description: patient will verbalize understanding of  self management of symptoms ongoing to reduce risk of complications from swelling  Rationale: to maximize safety and independence with performance of ADLs and functional tasks  Goal Progress: MET  Date Met: 10/01/24  PT Goal 2  Goal Identifier: LLIS  Goal Description: Pt will improve LLIS by at least 8 points by the end of therapy in order to demonstrate MCID in score to demonstrate improved functional mobility and improved ADLs.  Goal Progress: initial  Target Date: 12/29/24  PT Goal 3  Goal Identifier: Walking  Goal Description: Pt will be able to walk > minutes while reporting <2/10 pain by the end of therapy in order to improve  overall QOL.  Goal Progress: initial  Target Date: 12/29/24      Frequency of Treatment: eval only  Duration of Treatment: eval    Recommended Referrals to Other Professionals:   Education Assessment:   Learner/Method: Patient;Demonstration    Risks and benefits of evaluation/treatment have been explained.   Patient/Family/caregiver agrees with Plan of Care.     Evaluation Time:     PT Lamine Low Complexity Minutes (44961): 16       Signing Clinician: Nancy Young PT        [facility]                                                                                   OUTPATIENT PHYSICAL THERAPY      PLAN OF TREATMENT FOR OUTPATIENT REHABILITATION   Patient's Last Name, First Name, Belen Hernandez YOB: 1959   Provider's Name   [facility]   Medical Record No.  [id number]     Onset Date: 09/18/24 (order date)  Start of Care Date: 10/01/24     Medical Diagnosis:  Venous stasis dermatitis of both lower extremities      PT Treatment Diagnosis:  lower extremity edema, weakness, pain Plan of Treatment  Frequency/Duration: eval only/ eval    Certification date from 10/01/24 to 12/29/24         See note for plan of treatment details and functional goals     Nancy Young PT                         I CERTIFY THE NEED  FOR THESE SERVICES FURNISHED UNDER        THIS PLAN OF TREATMENT AND WHILE UNDER MY CARE     (Physician attestation of this document indicates review and certification of the therapy plan).              Referring Provider:  Anat Benitez    Initial Assessment  See Epic Evaluation- Start of Care Date: 10/01/24                 right normal/left normal
